# Patient Record
Sex: FEMALE | Race: WHITE | NOT HISPANIC OR LATINO | Employment: FULL TIME | URBAN - METROPOLITAN AREA
[De-identification: names, ages, dates, MRNs, and addresses within clinical notes are randomized per-mention and may not be internally consistent; named-entity substitution may affect disease eponyms.]

---

## 2017-07-20 DIAGNOSIS — R07.89 CHEST DISCOMFORT: ICD-10-CM

## 2017-07-20 DIAGNOSIS — I10 ESSENTIAL HYPERTENSION, MALIGNANT: Primary | ICD-10-CM

## 2018-05-22 ENCOUNTER — OFFICE VISIT (OUTPATIENT)
Dept: GASTROENTEROLOGY | Facility: CLINIC | Age: 68
End: 2018-05-22
Payer: COMMERCIAL

## 2018-05-22 ENCOUNTER — TELEPHONE (OUTPATIENT)
Dept: GASTROENTEROLOGY | Facility: CLINIC | Age: 68
End: 2018-05-22

## 2018-05-22 VITALS
HEART RATE: 80 BPM | HEIGHT: 66 IN | SYSTOLIC BLOOD PRESSURE: 152 MMHG | WEIGHT: 230.19 LBS | BODY MASS INDEX: 36.99 KG/M2 | DIASTOLIC BLOOD PRESSURE: 89 MMHG

## 2018-05-22 DIAGNOSIS — R10.13 DYSPEPSIA: Primary | ICD-10-CM

## 2018-05-22 DIAGNOSIS — R10.11 RIGHT UPPER QUADRANT PAIN: ICD-10-CM

## 2018-05-22 PROCEDURE — 99203 OFFICE O/P NEW LOW 30 MIN: CPT | Mod: S$GLB,,, | Performed by: INTERNAL MEDICINE

## 2018-05-22 PROCEDURE — 3079F DIAST BP 80-89 MM HG: CPT | Mod: CPTII,S$GLB,, | Performed by: INTERNAL MEDICINE

## 2018-05-22 PROCEDURE — 3077F SYST BP >= 140 MM HG: CPT | Mod: CPTII,S$GLB,, | Performed by: INTERNAL MEDICINE

## 2018-05-22 RX ORDER — METFORMIN HYDROCHLORIDE 500 MG/1
1000 TABLET ORAL 2 TIMES DAILY WITH MEALS
COMMUNITY

## 2018-05-22 NOTE — LETTER
May 22, 2018      MAYA Blackman MD  [address]  [address]  [facility] [address]           [facility]  [address], [address]   [facility] [address]  Phone: [phone]  Fax: [phone]          Patient: Morelia Holder   MR Number: [id number]   YOB: 1950   Date of Visit: 5/22/2018       Dear Dr. MAYA Blackman:    Thank you for referring Morelia Holder to me for evaluation. Attached you will find relevant portions of my assessment and plan of care.    If you have questions, please do not hesitate to call me. I look forward to following Morelia Holder along with you.    Sincerely,    Isiah Arreaga Jr., MD    Enclosure  CC:  No Recipients    If you would like to receive this communication electronically, please contact [email or web address] or [phone] to request more information on [employer] Link access.    For providers and/or their staff who would like to refer a patient to [facility], please contact us through our one-stop-shop provider referral line, [facility], at [phone].    If you feel you have received this communication in error or would no longer like to receive these types of communications, please e-mail [email or web address]

## 2018-05-22 NOTE — PATIENT INSTRUCTIONS
Abdominal Ultrasound  An abdominal ultrasound is an imaging test that uses sound waves to form pictures of your abdominal organs. It can help find organ problems, such as gallstones, kidney stones, or liver disease. Ultrasound does not use ionizing radiation and does not have any known risks. It can also see many blood vessels in the abdomen. If needed as part of your exam, the blood flow in these blood vessels can also be evaluated.  Before your test  · What you need to do to get ready for the test depends on the area of your body that will be looked at. Follow any directions youre given for not eating or drinking before the procedure. Your healthcare provider will give you instructions if required.  · Follow all other instructions given by your provider.  For best results, be prepared to answer questions about your medical history, including the following:  · Previous abdominal surgery  · Previous abdominal imaging tests, including ultrasound, CT, or MRI studies  During your test  · You may be asked to put on a gown.  · You will lie on an exam table with your abdomen exposed.  · A nongreasy gel will be put on your skin.  · The sonographer will use a handheld probe (transducer) against your abdomen. This probe helps create images of your abdominal organs.  · You may see the pictures of your organs on screen.  · Certain organs, like the liver, can be biopsied during an ultrasound. This will require additional steps and your provider can discuss these details with you.  The person who performs the ultrasound is called a sonographer. The sonographer can answer questions about the test. But only a doctor can explain the results.    Your test results  Your healthcare provider will discuss the test results with you during a follow-up visit or over the phone. Your next appointment is: _________________  Date Last Reviewed: 2/1/2017  © 0761-0578 [employer]. [address] [place] [address], [place] [address]. All  rights reserved. This information is not intended as a substitute for professional medical care. Always follow your healthcare professional's instructions.

## 2018-05-22 NOTE — PROGRESS NOTES
"Subjective:      Patient ID: Morelia Holder is a 68 y.o. female.    Chief Complaint: Gastroesophageal Reflux and Abdominal Pain    HPI:   Patient 68-year-old female presents for GI follow-up.  Gives a history of chronic intermittent right upper quadrant discomfort.  Indicates this symptom has been present intermittently since her cholecystectomy several years ago.  Following cholecystectomy she had an incisional hernia repair with mesh.  Also treated some dyspepsia and "upset stomach" occasionally.  Response to over-the-counter PPIs.  Indicates she's had both upper and lower endoscopy within the last 5 years.  Past GI history includes appendectomy, cholecystectomy.  Prior endoscopic evaluation by Dr. Chris VIERA over 5 years ago.  Nonsmoker.  Nondrinker.  Denies significant NSAID use  Family history negative for GI neoplasm.    Review of patient's allergies indicates:   Allergen Reactions    Sulfa (sulfonamide antibiotics) Nausea Only     History reviewed. No pertinent past medical history.  Past Surgical History:   Procedure Laterality Date    CHOLECYSTECTOMY Bilateral     COLONOSCOPY      UPPER GASTROINTESTINAL ENDOSCOPY       History reviewed. No pertinent family history.  Social History     Social History    Marital status:      Spouse name: N/A    Number of children: N/A    Years of education: N/A     Occupational History    Not on file.     Social History Main Topics    Smoking status: Never Smoker    Smokeless tobacco: Former User    Alcohol use No    Drug use: Unknown    Sexual activity: Not on file     Other Topics Concern    Not on file     Social History Narrative    No narrative on file       Review of Systems:  Constitutional: Negative for appetite change.  Fatigue  HENT: Negative for trouble swallowing.   Eyes: Negative for photophobia.   Respiratory: Positive for cough.  No shortness of breath.   Cardiovascular: Negative for palpitations.   Gastrointestinal: See HPI for " "details.  Genitourinary: Negative for frequency and hematuria.   Skin: Negative for rash.   Musculoskeletal: Low back pain.  Neurological: Negative for weakness and headaches.   Hematological: Negative.   Psychiatric/Behavioral: Negative for suicidal ideas and behavioral problems.  Anxiety, depression.    Objective:     BP (!) 152/89 (BP Location: Left arm, Patient Position: Sitting)   Pulse 80   Ht 5' 6" (1.676 m)   Wt 104.4 kg (230 lb 3.2 oz)   BMI 37.16 kg/m²     Physical Exam:   alert no distress.  Eyes: Pupils are equal, round, and reactive to light.   Neck: Supple. No mass  Cardiovascular: Regular rhythm . No murmur   Pulmonary/Chest: Lungs clear   Abdominal: Soft. No mass palpated. Nontender, no guarding. Positive bowel sounds   Musculoskeletal: No deformity. No edema.   Psychiatric: Alert and oriented    Assessment:     No diagnosis found.  Plan:     Morelia was seen today for gastroesophageal reflux and abdominal pain.    Diagnoses and all orders for this visit:    Dyspepsia  -     US Abdomen Complete; Future    Right upper quadrant pain  -     US Abdomen Complete; Future      Plan:  Continue PPI when necessary  Ultrasound abdomen  Follow-up    "

## 2018-08-20 DIAGNOSIS — Z12.31 VISIT FOR SCREENING MAMMOGRAM: Primary | ICD-10-CM

## 2018-08-20 DIAGNOSIS — I10 MALIGNANT HYPERTENSION: Primary | ICD-10-CM

## 2019-03-18 ENCOUNTER — HOSPITAL ENCOUNTER (OUTPATIENT)
Dept: RADIOLOGY | Facility: HOSPITAL | Age: 69
Discharge: HOME | End: 2019-03-18
Attending: INTERNAL MEDICINE
Payer: COMMERCIAL

## 2019-03-18 ENCOUNTER — OFFICE VISIT (OUTPATIENT)
Dept: GASTROENTEROLOGY | Facility: CLINIC | Age: 69
End: 2019-03-18
Payer: COMMERCIAL

## 2019-03-18 VITALS
SYSTOLIC BLOOD PRESSURE: 147 MMHG | WEIGHT: 229 LBS | DIASTOLIC BLOOD PRESSURE: 72 MMHG | HEIGHT: 66 IN | HEART RATE: 90 BPM | BODY MASS INDEX: 36.8 KG/M2

## 2019-03-18 DIAGNOSIS — N81.6 RECTOCELE: ICD-10-CM

## 2019-03-18 DIAGNOSIS — E03.9 HYPOTHYROIDISM, UNSPECIFIED TYPE: ICD-10-CM

## 2019-03-18 DIAGNOSIS — R10.11 RUQ ABDOMINAL PAIN: Primary | ICD-10-CM

## 2019-03-18 DIAGNOSIS — E11.8 TYPE 2 DIABETES MELLITUS WITH COMPLICATION, WITHOUT LONG-TERM CURRENT USE OF INSULIN: ICD-10-CM

## 2019-03-18 DIAGNOSIS — R11.2 NON-INTRACTABLE VOMITING WITH NAUSEA, UNSPECIFIED VOMITING TYPE: ICD-10-CM

## 2019-03-18 DIAGNOSIS — K21.9 GASTROESOPHAGEAL REFLUX DISEASE, ESOPHAGITIS PRESENCE NOT SPECIFIED: ICD-10-CM

## 2019-03-18 PROCEDURE — 3078F PR MOST RECENT DIASTOLIC BLOOD PRESSURE < 80 MM HG: ICD-10-PCS | Mod: CPTII,S$GLB,, | Performed by: INTERNAL MEDICINE

## 2019-03-18 PROCEDURE — 74019 RADEX ABDOMEN 2 VIEWS: CPT | Mod: TC,FY

## 2019-03-18 PROCEDURE — 99999 PR PBB SHADOW E&M-EST. PATIENT-LVL IV: ICD-10-PCS | Mod: PBBFAC,,, | Performed by: INTERNAL MEDICINE

## 2019-03-18 PROCEDURE — 99214 OFFICE O/P EST MOD 30 MIN: CPT | Mod: S$GLB,,, | Performed by: INTERNAL MEDICINE

## 2019-03-18 PROCEDURE — 1101F PT FALLS ASSESS-DOCD LE1/YR: CPT | Mod: CPTII,S$GLB,, | Performed by: INTERNAL MEDICINE

## 2019-03-18 PROCEDURE — 3045F PR MOST RECENT HEMOGLOBIN A1C LEVEL 7.0-9.0%: ICD-10-PCS | Mod: CPTII,S$GLB,, | Performed by: INTERNAL MEDICINE

## 2019-03-18 PROCEDURE — 3045F PR MOST RECENT HEMOGLOBIN A1C LEVEL 7.0-9.0%: CPT | Mod: CPTII,S$GLB,, | Performed by: INTERNAL MEDICINE

## 2019-03-18 PROCEDURE — 3077F PR MOST RECENT SYSTOLIC BLOOD PRESSURE >= 140 MM HG: ICD-10-PCS | Mod: CPTII,S$GLB,, | Performed by: INTERNAL MEDICINE

## 2019-03-18 PROCEDURE — 99999 PR PBB SHADOW E&M-EST. PATIENT-LVL IV: CPT | Mod: PBBFAC,,, | Performed by: INTERNAL MEDICINE

## 2019-03-18 PROCEDURE — 3078F DIAST BP <80 MM HG: CPT | Mod: CPTII,S$GLB,, | Performed by: INTERNAL MEDICINE

## 2019-03-18 PROCEDURE — 99214 PR OFFICE/OUTPT VISIT, EST, LEVL IV, 30-39 MIN: ICD-10-PCS | Mod: S$GLB,,, | Performed by: INTERNAL MEDICINE

## 2019-03-18 PROCEDURE — 74019 XR ABDOMEN FLAT AND ERECT: ICD-10-PCS | Mod: 26,,, | Performed by: RADIOLOGY

## 2019-03-18 PROCEDURE — 74019 RADEX ABDOMEN 2 VIEWS: CPT | Mod: 26,,, | Performed by: RADIOLOGY

## 2019-03-18 PROCEDURE — 3077F SYST BP >= 140 MM HG: CPT | Mod: CPTII,S$GLB,, | Performed by: INTERNAL MEDICINE

## 2019-03-18 PROCEDURE — 1101F PR PT FALLS ASSESS DOC 0-1 FALLS W/OUT INJ PAST YR: ICD-10-PCS | Mod: CPTII,S$GLB,, | Performed by: INTERNAL MEDICINE

## 2019-03-18 RX ORDER — TEMAZEPAM 22.5 MG/1
30 CAPSULE ORAL NIGHTLY PRN
COMMUNITY
End: 2019-03-26

## 2019-03-18 RX ORDER — MELOXICAM 7.5 MG/1
7.5 TABLET ORAL DAILY
COMMUNITY

## 2019-03-18 RX ORDER — DULOXETIN HYDROCHLORIDE 20 MG/1
20 CAPSULE, DELAYED RELEASE ORAL DAILY
COMMUNITY

## 2019-03-18 RX ORDER — CAPTOPRIL 50 MG/1
25 TABLET ORAL DAILY
COMMUNITY
End: 2022-05-13 | Stop reason: ALTCHOICE

## 2019-03-18 RX ORDER — PANTOPRAZOLE SODIUM 40 MG/1
40 TABLET, DELAYED RELEASE ORAL DAILY
Qty: 90 TABLET | Refills: 3 | Status: SHIPPED | OUTPATIENT
Start: 2019-03-18 | End: 2022-05-13 | Stop reason: ALTCHOICE

## 2019-03-18 RX ORDER — TRAMADOL HYDROCHLORIDE 50 MG/1
50 TABLET ORAL EVERY 6 HOURS PRN
COMMUNITY
End: 2022-05-13 | Stop reason: ALTCHOICE

## 2019-03-18 RX ORDER — COLESTIPOL HYDROCHLORIDE 5 G/5G
1 GRANULE, FOR SUSPENSION ORAL 2 TIMES DAILY
COMMUNITY
End: 2022-05-13 | Stop reason: ALTCHOICE

## 2019-03-18 NOTE — PROGRESS NOTES
"Subjective:       Patient ID: Morelia Holder is a 68 y.o. female.    Chief Complaint: Abdominal Pain and Nausea    67 yo F complains of RUQ pain.  She states that she had her gallbladder removed 9193-7051.  At that time she had been monitored by Dr. Carrillo for gallbladder issues and surgery had been postponed several times due to having "medical treatment" done after EGDs.  She then presented with acute cholecystitis and was told her gallbladder "burst" and it was removed during an inpatient hospital encounter.  She was hospitalized for 5 days and then was told she could not return to work for the full 6 week post op period.  She subsequently had an incisional hernia at that site with mesh placement.  Despite having gallbladder removed, she continues to have intermittent RUQ pain that feels exactly the same as previous.  She had several EGDs at that time and was dx with GERD; she has never had heartburn or regurgitation, just RUQ/upper abdominal pain.  She is not currently on PPI.  She states she gets RUQ pain with nausea at least 5 times per week.  Frequently it is accompanied by vomiting of bile.  These symptoms are random and not associated with certain foods.  She saw Dr. Arreaga last year, but could not get u/s as she had a death in the family, and then she states radiology would not let her reschedule telling her the order was .  Last colonoscopy was several years ago by Dr. Ibrahim.    She also complains of a "bulge" in her perineum.  This is sometimes improved with BM.  She has h/o bladder sling after birth of first child in late 1970's, and then had 3 additional children.  Her GYN, who is now retired, told her that she would have future problems with this and would need "major surgery" to fix it.  She has frequent urination, and mild difficulty passing BM.  She does not need vaginal digitization in order to pass BM.  She desires evaluation for this.    She has appt with her PCP next " "week.      Review of Systems   Constitutional: Negative for appetite change and unexpected weight change.   Eyes: Negative for photophobia and visual disturbance.   Respiratory: Negative for chest tightness, shortness of breath and wheezing.    Cardiovascular: Negative for chest pain, palpitations and leg swelling.   Genitourinary: Positive for frequency. Negative for dysuria, flank pain and hematuria.   Musculoskeletal: Negative for joint swelling and myalgias.   Skin: Negative for color change and rash.   Neurological: Negative for dizziness and speech difficulty.   Psychiatric/Behavioral: Negative for confusion and hallucinations.       Objective:       BP (!) 147/72 (BP Location: Right arm, Patient Position: Sitting)   Pulse 90   Ht 5' 6" (1.676 m)   Wt 103.9 kg (229 lb)   BMI 36.96 kg/m²     Physical Exam   Constitutional: She is oriented to person, place, and time. She appears well-developed and well-nourished.   Eyes: EOM are normal. Pupils are equal, round, and reactive to light.   Neck: Normal range of motion. Neck supple.   Cardiovascular: Normal rate and regular rhythm.   Pulmonary/Chest: Effort normal and breath sounds normal.   Abdominal: Soft. Bowel sounds are normal. She exhibits no distension and no mass. There is no tenderness. There is no rebound and no guarding.   No RUQ TTP noted   Musculoskeletal: Normal range of motion. She exhibits no edema.   Neurological: She is alert and oriented to person, place, and time.   Psychiatric: She has a normal mood and affect. Her behavior is normal. Judgment and thought content normal.       Assessment:       1. RUQ abdominal pain    2. Non-intractable vomiting with nausea, unspecified vomiting type    3. Gastroesophageal reflux disease, esophagitis presence not specified    4. Rectocele    5. Type 2 diabetes mellitus with complication, without long-term current use of insulin    6. Hypothyroidism, unspecified type        Plan:       RUQ abdominal pain; " Non-intractable vomiting with nausea, unspecified vomiting type  -     US Abdomen Complete; Future; Expected date: 03/18/2019    Gastroesophageal reflux disease, esophagitis presence not specified  -     pantoprazole (PROTONIX) 40 MG tablet; Take 1 tablet (40 mg total) by mouth once daily.  Dispense: 90 tablet; Refill: 3  -     She will do PPI daily for 90 days and then reassess  -     I have a low threshold for repeating EGD if u/s and LFTs are normal, and if she is not better with PPI alone    Rectocele  -     X-Ray Abdomen Flat And Erect; Future; Expected date: 03/18/2019; being done to quantify stool burden  -     Ambulatory consult to Urogynecology    Type 2 diabetes mellitus with complication, without long-term current use of insulin  -     CBC auto differential; Future; Expected date: 03/18/2019  -     Comprehensive metabolic panel; Future; Expected date: 03/18/2019  -     Hemoglobin A1c; Future; Expected date: 03/18/2019  -     Lipid panel; Future; Expected date: 03/18/2019    Hypothyroidism, unspecified type  -     TSH; Future; Expected date: 03/18/2019    She will need repeat screening colonoscopy at a date in the near future, to be done at [facility]

## 2019-03-26 ENCOUNTER — TELEPHONE (OUTPATIENT)
Dept: UROGYNECOLOGY | Facility: CLINIC | Age: 69
End: 2019-03-26

## 2019-03-26 ENCOUNTER — INITIAL CONSULT (OUTPATIENT)
Dept: UROGYNECOLOGY | Facility: CLINIC | Age: 69
End: 2019-03-26
Payer: COMMERCIAL

## 2019-03-26 ENCOUNTER — PATIENT MESSAGE (OUTPATIENT)
Dept: GASTROENTEROLOGY | Facility: CLINIC | Age: 69
End: 2019-03-26

## 2019-03-26 VITALS
HEIGHT: 66 IN | WEIGHT: 221.81 LBS | BODY MASS INDEX: 35.65 KG/M2 | DIASTOLIC BLOOD PRESSURE: 72 MMHG | SYSTOLIC BLOOD PRESSURE: 124 MMHG

## 2019-03-26 DIAGNOSIS — N95.2 VAGINAL ATROPHY: ICD-10-CM

## 2019-03-26 DIAGNOSIS — K46.9 ENTEROCELE: ICD-10-CM

## 2019-03-26 DIAGNOSIS — R19.8 IRREGULAR BOWEL HABITS: ICD-10-CM

## 2019-03-26 DIAGNOSIS — E11.8 TYPE 2 DIABETES MELLITUS WITH COMPLICATION, WITHOUT LONG-TERM CURRENT USE OF INSULIN: ICD-10-CM

## 2019-03-26 DIAGNOSIS — N39.46 MIXED STRESS AND URGE URINARY INCONTINENCE: ICD-10-CM

## 2019-03-26 DIAGNOSIS — R21 GROIN RASH: ICD-10-CM

## 2019-03-26 DIAGNOSIS — R35.1 NOCTURIA MORE THAN TWICE PER NIGHT: ICD-10-CM

## 2019-03-26 DIAGNOSIS — N81.6 RECTOCELE: ICD-10-CM

## 2019-03-26 DIAGNOSIS — R94.31 ABNORMAL EKG: ICD-10-CM

## 2019-03-26 DIAGNOSIS — R61 DIAPHORESIS: ICD-10-CM

## 2019-03-26 DIAGNOSIS — R30.0 DYSURIA: Primary | ICD-10-CM

## 2019-03-26 DIAGNOSIS — Z12.4 CERVICAL CANCER SCREENING: ICD-10-CM

## 2019-03-26 DIAGNOSIS — Z13.820 SCREENING FOR OSTEOPOROSIS: ICD-10-CM

## 2019-03-26 PROCEDURE — 51701 INSERT BLADDER CATHETER: CPT | Mod: S$GLB,,, | Performed by: OBSTETRICS & GYNECOLOGY

## 2019-03-26 PROCEDURE — 51701 PR INSERTION OF NON-INDWELLING BLADDER CATHETERIZATION FOR RESIDUAL UR: ICD-10-PCS | Mod: S$GLB,,, | Performed by: OBSTETRICS & GYNECOLOGY

## 2019-03-26 PROCEDURE — 87086 URINE CULTURE/COLONY COUNT: CPT

## 2019-03-26 PROCEDURE — 99205 OFFICE O/P NEW HI 60 MIN: CPT | Mod: 25,S$GLB,, | Performed by: OBSTETRICS & GYNECOLOGY

## 2019-03-26 PROCEDURE — 99999 PR PBB SHADOW E&M-EST. PATIENT-LVL III: ICD-10-PCS | Mod: PBBFAC,,, | Performed by: OBSTETRICS & GYNECOLOGY

## 2019-03-26 PROCEDURE — 99205 PR OFFICE/OUTPT VISIT, NEW, LEVL V, 60-74 MIN: ICD-10-PCS | Mod: 25,S$GLB,, | Performed by: OBSTETRICS & GYNECOLOGY

## 2019-03-26 PROCEDURE — 88175 CYTOPATH C/V AUTO FLUID REDO: CPT

## 2019-03-26 PROCEDURE — 99999 PR PBB SHADOW E&M-EST. PATIENT-LVL III: CPT | Mod: PBBFAC,,, | Performed by: OBSTETRICS & GYNECOLOGY

## 2019-03-26 RX ORDER — DULOXETIN HYDROCHLORIDE 20 MG/1
CAPSULE, DELAYED RELEASE ORAL
COMMUNITY
End: 2019-03-26 | Stop reason: SDUPTHER

## 2019-03-26 RX ORDER — MELOXICAM 7.5 MG/1
TABLET ORAL
COMMUNITY
End: 2019-03-26 | Stop reason: SDUPTHER

## 2019-03-26 RX ORDER — CYCLOBENZAPRINE HCL 10 MG
TABLET ORAL
COMMUNITY
End: 2019-03-26

## 2019-03-26 RX ORDER — TRAMADOL HYDROCHLORIDE 50 MG/1
TABLET ORAL
COMMUNITY
End: 2019-03-26 | Stop reason: SDUPTHER

## 2019-03-26 RX ORDER — ESTRADIOL 0.1 MG/G
CREAM VAGINAL
Qty: 42.5 G | Refills: 11 | Status: ON HOLD | OUTPATIENT
Start: 2019-03-26 | End: 2022-05-16 | Stop reason: CLARIF

## 2019-03-26 RX ORDER — MONTELUKAST SODIUM 4 MG/1
TABLET, CHEWABLE ORAL
COMMUNITY

## 2019-03-26 RX ORDER — BUTALBITAL, ACETAMINOPHEN AND CAFFEINE 50; 325; 40 MG/1; MG/1; MG/1
TABLET ORAL
COMMUNITY
End: 2022-05-13 | Stop reason: ALTCHOICE

## 2019-03-26 RX ORDER — LEVOTHYROXINE SODIUM 50 UG/1
TABLET ORAL
COMMUNITY
End: 2022-05-13 | Stop reason: ALTCHOICE

## 2019-03-26 RX ORDER — INSULIN GLARGINE 100 [IU]/ML
20 INJECTION, SOLUTION SUBCUTANEOUS NIGHTLY
COMMUNITY

## 2019-03-26 NOTE — PATIENT INSTRUCTIONS
Fiber Information Sheet  Your doctor has recommended that you follow a high fiber diet. The addition of fiber to your diet can make an enormous difference in your bowel control and regularity. Fiber helps people whether they lose stool or have trouble with constipation. Fiber works by bulking the stool and keeping it formed, yet making the movement soft and easy to pass. Fiber helps keep moisture within the stool so that neither diarrhea nor hard stool occurs. Fiber makes the bowels work more regularly, but it is not a laxative. An additional bonus from eating a high fiber diet is that your risk of cancer is reduced, too.    Most of us eat some high fiber foods already, but nearly all of us do not eat the necessary amount. For example, a slice of whole wheat bread contains only about 10% of the daily recommended amount of fiber. This means if you are relying on only whole wheat bread to meet the recommended fiber requirements, you would need to eat  between 10-18 slices every day! Please note that fiber is NOT in any meat or dairy product. It is only found in grains, vegetables and fruits. The recommended daily fiber intake is 20-25 grams. Foods having high fiber content include:     Fiber One Cereal, ½ cup 13.0 g   Whelan beans, ¾ cup 10.4 g   Wheat bran cereal, 1 oz 10.0 g   Kidney beans, ¾ cup 9.3 g   All Bran Cereal, ½ cup 6.0 g   Oat Bran Cereal, hot, 1 oz 4.0 g   Banana, 1medium 3.8 g   Canned pears, ½ cup 3.7 g   3 prunes or ¼ cup raisins 3.5 g   Whole Wheat Total, 1 cup 3.0 g   Carrots, ½ cup 3.2 g   Apple, small 2.8 g   Broccoli, ½ cup 2.8 g   Cauliflower, ½ cup 2.6 g   Oatmeal, 1 oz 2.5 g   Whole Wheat Toast 2.0 g   Cheerios, 1 1/3 cup 2.0 g   Baked potato with skin 2.0 g   Corn, ½ cup 1.9 g   Popcorn, 3 cups 1.9 g   Orange, medium 1.9 g   Granola bar 1.0 g   Lettuce, ½ cup 0.9 g    If you dont think that you can get enough fiber through your everyday diet, there are many good fiber supplements you can  take along with eating your high fiber diet. Some of these are: Metamucil (1 heaping teaspoon or 1-2 wafers), Citrucel (1 tablespoon), Fiberall (1-2 wafers or 1 teaspoon), Perdium (2 rounded teaspoons) and 1-2 teaspoons unprocessed bran (to mix with foods)    You may need to use the fiber supplement up to 3-4 times daily to produce normal elimination. Please follow specific package directions or call us for help in regulating the dose. You may notice some bloating and/or increased gas at first. These symptoms can be relieved by adding fiber to your diet slowly. Once your body gets used to this increased fiber, these symptoms will go away.   -------------------------------------------------------------    Bladder Irritants  Certain foods and drinks have been associated with worsening symptoms of urinary frequency, urgency, urge incontinence, or bladder pain. If you suffer from any of these conditions, you may wish to try eliminating one or more of these foods from your diet and see if your symptoms improve. If bladder symptoms are related to dietary factors, strict adherence to a diet thateliminates the food should bring marked relief in 10 days. Once you are feeling better, you can begin to add foods back into your diet, one at a time. If symptoms return, you will be able to identify the irritant. As you add foods back to your diet it is very important that you drink significant amounts of water.    -----------------------------------------------------------------------------------------------  List of Common Bladder Irritants*  Alcoholic beverages  Apples and apple juice  Cantaloupe  Carbonated beverages  Chili and spicy foods  Chocolate  Citrus fruit  Coffee (including decaffeinated)  Cranberries and cranberry juice  Grapes  Guava  Milk Products: milk, cheese, cottage cheese, yogurt, ice cream  Peaches  Pineapple  Plums  Strawberries  Sugar especially artificial sweeteners, saccharin, aspartame, corn sweeteners,  honey, fructose, sucrose, lactose  Tea  Tomatoes and tomato juice  Vitamin B complex  Vinegar  *Most people are not sensitive to ALL of these products; your goal is to find the foods that make YOUR symptoms worse.  ---------------------------------------------------------------------------------------------------    Low-acid fruit substitutions include apricots, papaya, pears and watermelon. Coffee drinkers can drink Kava or other lowacid instant drinks. Tea drinkers can substitute non-citrus herbal and sun brewed teas. Calcium carbonate co-buffered with calcium ascorbate can be substituted for Vitamin C. Prelief is a dietary supplement that works as an acid blocker for the bladder.    Where to get more information:        Overcoming Bladder Disorders by Summer Portillo and Katiuska Gibbs, 1990        You Dont Have to Live with Cystitis! By Ramona Graves, 1988  · http://www.urologymanagement.org/oab    -------------------------------------------------------------  1) Hot flashes:  --probably not hormone related as last menopausal symptom was ~10 years ago  --worse x 1 week; stopped years ago  --feels general sweaty; doesn't feel like usual HF used to feel  --started PPI recently, which may her nauseated  --TSH 3/2019 WNL, A1c 3/2019 8%, EKG 3/2019 nonspecific T wave abnl, troponin normal 3/2019  --see cardiology in near future to make sure no CAD issues occurring    2)  Stage 2 rectocele/enterocele:  --apex and anterior seem supported: ? H/o uterine suspension  --options: observation vs pessary vs surgery (vaginal rectocele/enterocele repair; seems like ant/apical well-supported)  --control straining with bowel movements    3)  DM2:  --check fasting blood glucose EVERY AM, before and after lunch; record and share with MD after 2-3 weeks--let PCP know  --controlling will help bowel motility     4)  Irregular bowel habits:  --eat high fiber diet  --hydrate well: drink extra 3-4  glasses of water a day  --get some light, gentle exercise  Controlling constipation may help bladder urgency/leakage and fiber may better control cholesterol and blood glucose.  Start daily fiber.  Take 1 tsp of fiber powder (psyllium or other sugar-free powder).  Mix in 8 oz of water.  Take x 3-5 days.  Then, increase fiber by 1 tsp every 3-5 days until stool is easy to pass, well-formed, less erratic.  Stop and continue at that dose.   Do not exceed 6 tsps/day.  May also use over the counter stool softener 1-2 x/day.    --use miralax if needed additionally    5)  Vaginal atrophy (dryness):  Use dime-sized amount  gram of estrogen cream in vagina/around bulge, inner lips, and opening nightly x 2 weeks, then twice a week thereafter.    --this can help skin from rubbing/being uncomfortable  --may also reduce UTIs after menopause and urinary urgency/frequency    6)  Mixed urinary incontinence, urge > stress:    --urine C&S  --control diabetes  --Empty bladder every 3 hours.  Empty well: wait a minute, lean forward on toilet.    --Avoid dietary irritants (see sheet).  Keep diary x 3-5 days to determine your irritants.  --KEGELS: do 10 in AM and 10 in PM, holding each x 10 seconds.  When you feel urge to go, STOP, KEGEL, and when urge has passed, then go to bathroom.  Consider PT in future.    --URGE: consider medication.  Takes 2-4 weeks to see if will have effect.  For dry mouth: get sour, sugar free lozenge or gum.    --STRESS:  Pessary vs. Sling.     7) Nocturia (nighttime urination): stop fluids 2 hours before bed/no water by bed.  If have leg swelling:  Elevate feet above chest x 1 hour before bed to get excess fluid off.  Can also use support hose (knee highs).      8)  Well-woman:  Pap test: years ago.  History of abnormal paps: No.  Pap done today.  If normal, no further needed but need annual GYN exam.   Mammogram: Date of last: 8/18.  Result: Normal  Colonoscopy: Date of last: ~2009.  Result:  Normal per  report.  Repeat due:  Per Dr. Lees.    DEXA:  Ordered.  Please schedule.      9)  Groin rash:  --keep clean/dry  --get antifungal cream: apply externally 2x/day x 7 days  --after that, use Zeasorb or other talc-free, anti fungal powder externally every day (absorbs extra wetness); wipe powder off at end of day    10)  RTC as needed.

## 2019-03-26 NOTE — LETTER
March 26, 2019      Mercedes Lees MD  [address]  [address]  [facility] [address]           [facility]  [address]  [place] [address]  Phone: [phone]          Patient: Morelia Holder   MR Number: [id number]   YOB: 1950   Date of Visit: 3/26/2019       Dear Dr. Mercedes Lees:    Thank you for referring Morelia Holder to me for evaluation. Attached you will find relevant portions of my assessment and plan of care.    If you have questions, please do not hesitate to call me. I look forward to following Morelia Holder along with you.    Sincerely,    Darrick Preston MD    Enclosure  CC:  No Recipients    If you would like to receive this communication electronically, please contact [email or web address] or [phone] to request more information on [employer] Link access.    For providers and/or their staff who would like to refer a patient to [facility], please contact us through our one-stop-shop provider referral line, [facility] [place], at [phone].    If you feel you have received this communication in error or would no longer like to receive these types of communications, please e-mail [email or web address]

## 2019-03-26 NOTE — PROGRESS NOTES
[facility] - GYNECOLOGY  [address]  [place] [address]    Morelia Holder  [id number]  1950  March 26, 2019    Consulting Physician: Mercedes Lees MD   GYN: none  Primary M.D.: MAYA Blackman MD    Chief Complaint   Patient presents with    Vaginal Prolapse     rectocele    PELVIC PRESSURE       HPI:     1)  UI:  (+) MICKY (rarely) < (+) UUI x few months.   (--) pads: underwear changes 1-2 times/PM.  Daytime frequency: Q 2 hours.  Nocturia: Yes: 2/night, more during last month.  (--) dysuria,  (--) hematuria,  (--) frequent UTIs.  (+) complete bladder emptying. Has to DV to help with some PV urgency, small PV amount.     2)  POP:  Present x 6-8 months but was told years ago that she would eventually start having issues. To introitus.  Symptoms:(+)  Mostly pressure with standing/sitting.  (--) vaginal bleeding. (--) vaginal discharge. Has had some vaginal burning x a few days + UUI, new.  (--) sexually active.  (--) h/o dyspareunia.  (--)  Vaginal dryness.  (--) vaginal estrogen use.     3)  BM:  (--) constipation/straining.  (--) chronic diarrhea. +loose stool regularly--was started on miralax for constipation on xray and PPI. Could not tolerate PPI due to N/V.   (--) hematochezia.  (--) fecal incontinence.  (--) fecal smearing/urgency.  (+) complete evacuation.  Not x 1 week.     4)  Hot flashes:  --worse x 1 week; stopped years ago  --feels general sweaty; doesn't feel like usual HF used to feel  --started PPI recently, which may her nauseated  --TSH 3/2019 WNL, A1c 3/2019 8%, EKG 3/2019 nonspecific T wave abnl, troponin normal 3/2019    Past Medical History  Past Medical History:   Diagnosis Date    Diabetes mellitus     GERD (gastroesophageal reflux disease)     Insomnia     Muscle spasm     Restless leg    DM: FBG: not checking.  Has seen nutritionist--tries to follow. No secondary disease.  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   Lab Results   Component Value Date    HGBA1C 8.0 (H)  "2019     Past Surgical History  Past Surgical History:   Procedure Laterality Date    BLADDER SUSPENSION      CHOLECYSTECTOMY Bilateral     COLONOSCOPY      UPPER GASTROINTESTINAL ENDOSCOPY     xlap/pfannenstiel bladder suspension + appy: ? Velasco or AF sling--had to have farmer in/out x several weeks post procedure.  Was told this was a "uterine suspension."  LSC stephania; c/b Hernia--had vert midline repair with mesh     Hysterectomy: none    Past Ob History     x 6.  C/s x 0.    Largest infant weight: 10#2oz.   yes FAVD. yes episiotomy.      Gynecologic History  LMP: No LMP recorded. Patient is postmenopausal.  Age of menarche: 15 yo.   Age of menopause: 41 yo  Menstrual history: h/o normal.   Pap test: years ago.  History of abnormal paps: No.  History of STIs:  No  Mammogram: Date of last: .  Result: Normal  Colonoscopy: Date of last: ~.  Result:  Normal per report.  Repeat due:  Per Dr. Lees.    DEXA:  none    Family History  Family History   Problem Relation Age of Onset    Breast cancer Neg Hx     Ovarian cancer Neg Hx     Cervical cancer Neg Hx     Endometrial cancer Neg Hx     Vaginal cancer Neg Hx       Colon CA: No  Breast CA: No  GYN CA: No   CA: Yes - mother (smoker)    Social History  Social History     Tobacco Use   Smoking Status Never Smoker   Smokeless Tobacco Never Used     Social History     Substance and Sexual Activity   Alcohol Use No   .    Social History     Substance and Sexual Activity   Drug Use Not Currently     The patient is .  Resides in [place] [address].  Employment status: currently employed [employer].     Allergies  Review of patient's allergies indicates:   Allergen Reactions    Sulfa (sulfonamide antibiotics) Nausea Only    Statins-hmg-coa reductase inhibitors     Sulfabenzamide        Medications  Current Outpatient Medications on File Prior to Visit   Medication Sig Dispense Refill    blood sugar diagnostic (CONTOUR " NEXT TEST STRIPS MISC) Contour Next Test Strips      butalbital-acetaminophen-caffeine -40 mg (FIORICET, ESGIC) -40 mg per tablet butalbital-acetaminophen-caffeine 50 mg-325 mg-40 mg tablet      captopril (CAPOTEN) 50 MG tablet Take 25 mg by mouth once daily.      colestipol (COLESTID) 1 gram Tab       colestipol (COLESTID) 5 gram Pack Take 1 g by mouth 2 (two) times daily.      DULoxetine (CYMBALTA) 20 MG capsule Take 20 mg by mouth once daily.      insulin (BASAGLAR KWIKPEN U-100 INSULIN) glargine 100 units/mL (3mL) SubQ pen Basaglar KwikPen U-100 Insulin 100 unit/mL (3 mL) subcutaneous      lancets (MICROLET LANCET MISC) Microlet Lancet   TEST TWICE A DAY      meloxicam (MOBIC) 7.5 MG tablet Take 7.5 mg by mouth once daily.      metFORMIN (GLUCOPHAGE) 500 MG tablet Take 1,000 mg by mouth 2 (two) times daily with meals.       multivit-calc-iron-FA-K-hb#244 (ALIVE WOMEN'S ENERGY) -80 mg-mcg-mcg Tab Alive Women's Energy   Take one tablet by mouth every day.      ondansetron (ZOFRAN-ODT) 4 MG TbDL Take 1 tablet (4 mg total) by mouth every 8 (eight) hours as needed (nausea). 15 tablet 0    traMADol (ULTRAM) 50 mg tablet Take 50 mg by mouth every 6 (six) hours as needed for Pain.      vitamin B complex 100  2-herbs 100 mg Tab vitamin B complex   Take one tablet by mouth every day.      zolpidem (AMBIEN) 10 mg Tab Take 5 mg by mouth nightly as needed.      [DISCONTINUED] DULoxetine (CYMBALTA) 20 MG capsule       [DISCONTINUED] meloxicam (MOBIC) 7.5 MG tablet       [DISCONTINUED] multivitamin capsule Take 1 capsule by mouth once daily.      [DISCONTINUED] traMADol (ULTRAM) 50 mg tablet       levothyroxine (SYNTHROID) 50 MCG tablet levothyroxine 50 mcg tablet      pantoprazole (PROTONIX) 40 MG tablet Take 1 tablet (40 mg total) by mouth once daily. 90 tablet 3    [DISCONTINUED] cyclobenzaprine (FLEXERIL) 10 MG tablet cyclobenzaprine 10 mg tablet      [DISCONTINUED] temazepam  "(RESTORIL) 22.5 MG capsule Take 30 mg by mouth nightly as needed for Insomnia.       No current facility-administered medications on file prior to visit.        Review of Systems A 14 point ROS was reviewed with pertinent positives as noted above in the history of present illness.      Constitutional: negative  Eyes: negative  Endocrine: negative  Gastrointestinal: negative  Cardiovascular: negative  Respiratory: negative  Allergic/Immunologic: negative  Integumentary: negative  Psychiatric: negative  Musculoskeletal: negative   Ear/Nose/Throat: negative  Neurologic: negative  Genitourinary: SEE HPI  Hematologic/Lymphatic: negative   Breast: negative    Urogynecologic Exam  /72 (BP Location: Left arm, Patient Position: Sitting, BP Method: Medium (Manual))   Ht 5' 6" (1.676 m)   Wt 100.6 kg (221 lb 12.5 oz)   BMI 35.80 kg/m²     GENERAL APPEARANCE:  The patient is well-developed, well-nourished.   Neck:  Supple with no thyromegaly, no carotid bruits.  Heart:  Regular rate and rhythm, no murmurs, rubs or gallops.  Lungs:  Clear.  No CVA tenderness.  Abdomen:  Soft, nontender, nondistended, no hepatosplenomegaly.  Incisions:  Vertical midline supra umbilical well-healed; LSC stephania well-healed    PELVIC:    External genitalia:  Normal Bartholins, Skenes and labia bilaterally.  Erythema at B Groin, concerning for candida vs atopic irritation from clothing/wetness.   Urethra:  No caruncle, diverticulum or masses.  (+) hypermobility.    Vagina:  Atrophy (+) , no bladder masses or tender, no discharge.    Cervix:  normal appearance. Pap done.   Uterus: normal size, contour, position, consistency, mobility, non-tender  Adnexa: Not palpable.    POP-Q:  Aa -1; Ba -1; C -6; Ap +1; Bp +1 (high to cuff); D -7.  Genital hiatus 4, perineal body 2, total vaginal length 10.    NEUROLOGIC:  Cranial nerves 2 through 12 intact.  Strength 5/5.  DTRs 2+ lower extremities.  S2 through 4 normal.  Sacral reflexes intact.    EXT: " BURT, 2+ pulses bilaterally, no C/C/E    COUGH STRESS TEST:  negative  KEGEL: 1 /5    RECTAL:    External:  Normal, (--) hemorrhoids, (--) dovetailing.   Internal:   (--) tenderness, (--) masses, Normal resting tone, Normal active tone. High rectocele/enterocele.     PVR: 20 mL    Impression    1. Dysuria    2. Diaphoresis    3. Abnormal EKG    4. Vaginal atrophy    5. Screening for osteoporosis    6. Cervical cancer screening    7. Type 2 diabetes mellitus with complication, without long-term current use of insulin    8. Rectocele    9. Enterocele    10. Irregular bowel habits    11. Nocturia more than twice per night    12. Mixed stress and urge urinary incontinence    13. Groin rash        Initial Plan  The patient was counseled regarding these issues. The patient was given a summary sheet containing each of these issues with possible options for evaluation and management. When appropriate, we also reviewed computer-generated diagrams specific to their diagnoses..  All questions were addressed to the patient's satisfaction.    1) Hot flashes:  --probably not hormone related as last menopausal symptom was ~10 years ago  --worse x 1 week; stopped years ago  --feels general sweaty; doesn't feel like usual HF used to feel  --started PPI recently, which may her nauseated  --TSH 3/2019 WNL, A1c 3/2019 8%, EKG 3/2019 nonspecific T wave abnl, troponin normal 3/2019  --see cardiology in near future to make sure no CAD issues occurring    2)  Stage 2 rectocele/enterocele:  --apex and anterior seem supported: ? H/o uterine suspension  --options: observation vs pessary vs surgery (vaginal rectocele/enterocele repair; seems like ant/apical well-supported)  --control straining with bowel movements    3)  DM2:  --check fasting blood glucose EVERY AM, before and after lunch; record and share with MD after 2-3 weeks--let PCP know  --controlling will help bowel motility     4)  Irregular bowel habits:  --eat high fiber  diet  --hydrate well: drink extra 3-4 glasses of water a day  --get some light, gentle exercise  Controlling constipation may help bladder urgency/leakage and fiber may better control cholesterol and blood glucose.  Start daily fiber.  Take 1 tsp of fiber powder (psyllium or other sugar-free powder).  Mix in 8 oz of water.  Take x 3-5 days.  Then, increase fiber by 1 tsp every 3-5 days until stool is easy to pass, well-formed, less erratic.  Stop and continue at that dose.   Do not exceed 6 tsps/day.  May also use over the counter stool softener 1-2 x/day.    --use miralax if needed additionally    5)  Vaginal atrophy (dryness):  Use dime-sized amount  gram of estrogen cream in vagina/around bulge, inner lips, and opening nightly x 2 weeks, then twice a week thereafter.    --this can help skin from rubbing/being uncomfortable  --may also reduce UTIs after menopause and urinary urgency/frequency    6)  Mixed urinary incontinence, urge > stress:    --urine C&S  --control diabetes  --Empty bladder every 3 hours.  Empty well: wait a minute, lean forward on toilet.    --Avoid dietary irritants (see sheet).  Keep diary x 3-5 days to determine your irritants.  --KEGELS: do 10 in AM and 10 in PM, holding each x 10 seconds.  When you feel urge to go, STOP, KEGEL, and when urge has passed, then go to bathroom.  Consider PT in future.    --URGE: consider medication.  Takes 2-4 weeks to see if will have effect.  For dry mouth: get sour, sugar free lozenge or gum.    --STRESS:  Pessary vs. Sling.     7) Nocturia (nighttime urination): stop fluids 2 hours before bed/no water by bed.  If have leg swelling:  Elevate feet above chest x 1 hour before bed to get excess fluid off.  Can also use support hose (knee highs).      8)  Well-woman:  Pap test: years ago.  History of abnormal paps: No.  Pap done today.  If normal, no further needed but need annual GYN exam.   Mammogram: Date of last: 8/18.  Result: Normal  Colonoscopy: Date of  last: ~2009.  Result:  Normal per report.  Repeat due:  Per Dr. Lees.    DEXA:  Ordered.  Please schedule.      9)  Groin rash:  --keep clean/dry  --get antifungal cream: apply externally 2x/day x 7 days  --after that, use Zeasorb or other talc-free, anti fungal powder externally every day (absorbs extra wetness); wipe powder off at end of day    10)  RTC as needed.     Approximately 60 min were spent in consult, 90 % in discussion.     Thank you for requesting consultation of your patient.  I look forward to participating in their care.    Darrick Preston  Female Pelvic Medicine and Reconstructive Surgery  [facility] [place], [place]

## 2019-03-27 ENCOUNTER — TELEPHONE (OUTPATIENT)
Dept: UROGYNECOLOGY | Facility: CLINIC | Age: 69
End: 2019-03-27

## 2019-03-27 ENCOUNTER — TELEPHONE (OUTPATIENT)
Dept: GASTROENTEROLOGY | Facility: CLINIC | Age: 69
End: 2019-03-27

## 2019-03-27 NOTE — TELEPHONE ENCOUNTER
----- Message from Natalie Lynn sent at 3/27/2019  8:47 AM CDT -----  No. [phone]    Patient returned your call regarding results.

## 2019-03-27 NOTE — TELEPHONE ENCOUNTER
----- Message from Darrick Preston MD sent at 3/26/2019  9:06 PM CDT -----  Regarding: please call patient and help make appt  Please call patient and help her make appt with cardiology. Thanks!

## 2019-03-28 LAB — BACTERIA UR CULT: NO GROWTH

## 2021-05-04 ENCOUNTER — PATIENT MESSAGE (OUTPATIENT)
Dept: RESEARCH | Facility: HOSPITAL | Age: 71
End: 2021-05-04

## 2022-03-18 ENCOUNTER — TELEPHONE (OUTPATIENT)
Dept: GASTROENTEROLOGY | Facility: CLINIC | Age: 72
End: 2022-03-18
Payer: MEDICARE

## 2022-03-18 NOTE — LETTER
March 18, 2022    Morelia Holder  [address] [facility] [address]             [facility] - Gastroenterology  [address], [place]   [facility] [address]  Phone: [phone]  Fax: [phone] Dear Ms. Holder:    We have attempted to contact you to schedule a screening colonoscopy that was ordered by your doctor. Please contact the office to schedule at [phone].      If you have any questions or concerns, please don't hesitate to call.    Sincerely,        Mercedes Lees MD

## 2022-04-05 ENCOUNTER — TELEPHONE (OUTPATIENT)
Dept: GASTROENTEROLOGY | Facility: CLINIC | Age: 72
End: 2022-04-05
Payer: MEDICARE

## 2022-04-05 RX ORDER — SODIUM, POTASSIUM,MAG SULFATES 17.5-3.13G
1 SOLUTION, RECONSTITUTED, ORAL ORAL DAILY
Qty: 1 KIT | Refills: 0 | Status: SHIPPED | OUTPATIENT
Start: 2022-04-05 | End: 2022-04-07

## 2022-04-05 NOTE — TELEPHONE ENCOUNTER
Referring Physician: Dr. Moreau                             Date: 4/5/2022    Reason for Referral: Screening colonoscopy      Family History of:   Colon polyp: No  Relationship/Age of Onset:       Colon cancer: No  Relationship/Age of Onset:       Patient with:   Hemoccults Done:       Iron deficient:   No      On Blood Thinner: No      Valvular heart disease/valve replacement: No      Anemia Present: No      On NSAID: Meloxicam      Lung disease: No      Kidney disease: no      Hx of polyps: No      Hx of colon cancer: No      Previous colon evalations: Yes  When: ?  Where:Dr. Ibrahim  Pertinent symptoms:           Review of patient's allergies indicates: Sulfa and Statins        Patient was scheduled for colonoscopy on  5/16/2022      with Dr. Lees at [facility].       instructions were reviewed with patient.        Prep sent to [facility] in [place]    SUPREP Instructions    You are scheduled for a colonoscopy with Dr. Lees on 5/16/2022 at [facility]. Enter through the [facility] Entrance and check in at Same Day Surgery.  To ensure that your test is accurate and complete, you MUST follow these instructions listed below.  If you have any questions, please call our office at [phone].  Plan on being at the hospital for your procedure for 3-4 hours.    1.  Follow a CLEAR LIQUID DIET for the entire day before your scheduled colonoscopy.  This means no solid food the entire day starting when you wake.  You may have as much of the clear liquids as you want throughout the day.   CLEAR LIQUID DIET:   - Avoid Red, Orange, Purple, and/or Blue food coloring   - NO DAIRY   - You can have:  Coffee with sugar (no creamer), tea, water, soda, apple or white grape juice, chicken or beef broth/bouillon (no meat, noodles, or veggies), green/yellow popsicles, green/yellow Jell-O, lemonade.    2.  AT 5 pm the evening before your colonoscopy, POUR ONE (1) BOTTLE OF SUPREP INTO THE MIXING CONTAINER, PROVIDED INSIDE  THE BOX.  ADD WATER TO THE LINE ON THE CONTAINER AND MIX IT WELL.  DRINK THE ENTIRE CONTAINER AND THEN DRINK TWO (2) MORE CONTAINERS OF WATER OVER THE NEXT 1 HOUR.  This is sometimes easier to drink if this solution is cold, so you can mix the solution 20 minutes ahead of time and place in the refrigerator prior to drinking.  You have to drink the solution within 30-45 minutes of mixing it.  Do NOT put this solution over ice.  It IS ok to drink with a straw.    3.  The endoscopy department will call you 1 day before your colonoscopy to tell you the exact time to arrive, AND to tell you the exact time to drink the 2nd portion of your prep (which will be FIVE HOURS BEFORE YOUR ARRIVAL TIME).  At this time given to you, POUR ONE (1) BOTTLE OF SUPREP INTO THE MIXING CONTAINER, PROVIDED INSIDE THE BOX.  ADD WATER TO THE LINE ON THE CONTAINER AND MIX IT WELL.  DRINK THE ENTIRE CONTAINER AND THEN DRINK TWO (2) MORE CONTAINERS OF WATER OVER THE NEXT 1 HOUR.  This is sometimes easier to drink if this solution is cold, so you can mix the solution 20 minutes ahead of time and place in the refrigerator prior to drinking.  You have to drink the solution within 30-45 minutes of mixing it.  Do NOT put this solution over ice.  It IS ok to drink with a straw.  Once this is complete, you may not have ANYTHING else by mouth!    4.  You must have someone with you to DRIVE YOU HOME since you will be receiving IV sedation for the colonoscopy.    5.  It is ok to take MOST of your REGULAR MEDICATIONS  in the morning of your test with a SIP of water.  THE ONLY MEDS YOU NEED TO HOLD ARE YOUR DIABETES MEDICATIONS,  SOME BLOOD PRESSURE MEDS, AND BLOOD THINNERS IF OK'D BY YOUR DOCTOR.  Do NOT have anything else to eat or drink the morning of your colonoscopy.  It is ok to brush your teeth.    6.  If you are on blood thinners THAT YOU HAVE BEEN INSTRUCTED TO HOLD BY YOUR DOCTOR FOR THIS PROCEDURE, then do NOT take this the morning of your  colonoscopy.  Do NOT stop these medications on your own, they must be approved to be held by your doctor.  Your colonoscopy can NOT be done if you are on these medications.  Examples of blood thinners include: Coumadin, Aggrenox, Plavix, Pradaxa, Reapro, Pletal, Xarelto, Ticagrelor, Brilinta, Eliquis, and high dose aspirin (325 mg).  You do not have to stop baby aspirin 81 mg.    7.  IF YOU ARE DIABETIC:  NO INSULIN OR ORAL MEDICATIONS THE MORNING OF THE COLONOSCOPY.  TAKE ONLY HALF THE DOSE OF YOUR INSULIN THE DAY BEFORE THE COLONOSCOPY.  DO NOT TAKE ANY ORAL DIABETIC MEDICATIONS THE DAY BEFORE THE COLONOSCOPY.  IF YOU ARE AN INSULIN DEPENDENT DIABETIC WITH UNSTABLE BLOOD SUGARS, NOTIFY YOUR PRIMARY CARE PHYSICIAN FOR INSTRUCTIONS.

## 2022-05-16 DIAGNOSIS — R94.31 ABNORMAL EKG: Primary | ICD-10-CM

## 2022-05-26 ENCOUNTER — TELEPHONE (OUTPATIENT)
Dept: GASTROENTEROLOGY | Facility: CLINIC | Age: 72
End: 2022-05-26
Payer: MEDICARE

## 2022-05-26 NOTE — TELEPHONE ENCOUNTER
----- Message from Mercedes Lees MD sent at 5/20/2022 10:07 AM CDT -----  Small benign TVA, repeat 5 years

## 2022-06-02 ENCOUNTER — OFFICE VISIT (OUTPATIENT)
Dept: CARDIOLOGY | Facility: CLINIC | Age: 72
End: 2022-06-02
Payer: MEDICARE

## 2022-06-02 ENCOUNTER — ANESTHESIA EVENT (OUTPATIENT)
Dept: MEDSURG UNIT | Facility: HOSPITAL | Age: 72
DRG: 244 | End: 2022-06-02
Payer: MEDICARE

## 2022-06-02 ENCOUNTER — HOSPITAL ENCOUNTER (INPATIENT)
Facility: HOSPITAL | Age: 72
LOS: 1 days | Discharge: HOME | DRG: 244 | End: 2022-06-03
Attending: EMERGENCY MEDICINE | Admitting: HOSPITALIST
Payer: MEDICARE

## 2022-06-02 ENCOUNTER — ANESTHESIA (OUTPATIENT)
Dept: MEDSURG UNIT | Facility: HOSPITAL | Age: 72
DRG: 244 | End: 2022-06-02
Payer: MEDICARE

## 2022-06-02 VITALS
WEIGHT: 234.19 LBS | SYSTOLIC BLOOD PRESSURE: 151 MMHG | HEART RATE: 44 BPM | HEIGHT: 68 IN | OXYGEN SATURATION: 98 % | BODY MASS INDEX: 35.49 KG/M2 | DIASTOLIC BLOOD PRESSURE: 71 MMHG

## 2022-06-02 DIAGNOSIS — I44.2 CHB (COMPLETE HEART BLOCK): Primary | ICD-10-CM

## 2022-06-02 DIAGNOSIS — I44.2 COMPLETE HEART BLOCK: ICD-10-CM

## 2022-06-02 DIAGNOSIS — R00.1 SYMPTOMATIC BRADYCARDIA: ICD-10-CM

## 2022-06-02 DIAGNOSIS — R07.9 CHEST PAIN: ICD-10-CM

## 2022-06-02 DIAGNOSIS — E66.01 SEVERE OBESITY (BMI 35.0-39.9) WITH COMORBIDITY: Primary | ICD-10-CM

## 2022-06-02 DIAGNOSIS — R00.1 BRADYCARDIA: ICD-10-CM

## 2022-06-02 DIAGNOSIS — R94.31 ABNORMAL EKG: ICD-10-CM

## 2022-06-02 DIAGNOSIS — I44.2 COMPLETE AV BLOCK: ICD-10-CM

## 2022-06-02 DIAGNOSIS — E11.9 DIABETES MELLITUS WITHOUT COMPLICATION: ICD-10-CM

## 2022-06-02 DIAGNOSIS — E03.2 HYPOTHYROIDISM DUE TO MEDICATION: ICD-10-CM

## 2022-06-02 PROBLEM — I10 HYPERTENSION: Status: ACTIVE | Noted: 2022-06-02

## 2022-06-02 PROBLEM — E11.8 TYPE 2 DIABETES MELLITUS WITH COMPLICATION: Status: RESOLVED | Noted: 2019-03-18 | Resolved: 2022-06-02

## 2022-06-02 LAB
ALBUMIN SERPL BCP-MCNC: 3.8 G/DL (ref 3.5–5.2)
ALP SERPL-CCNC: 38 U/L (ref 55–135)
ALT SERPL W/O P-5'-P-CCNC: 23 U/L (ref 10–44)
ANION GAP SERPL CALC-SCNC: 8 MMOL/L (ref 8–16)
APTT BLDCRRT: 22.6 SEC (ref 21–32)
ASCENDING AORTA: 3.47 CM
AST SERPL-CCNC: 20 U/L (ref 10–40)
AV INDEX (PROSTH): 0.85
AV MEAN GRADIENT: 6 MMHG
AV PEAK GRADIENT: 10 MMHG
AV VALVE AREA: 2.88 CM2
AV VELOCITY RATIO: 0.81
BASOPHILS # BLD AUTO: 0.04 K/UL (ref 0–0.2)
BASOPHILS NFR BLD: 0.8 % (ref 0–1.9)
BILIRUB SERPL-MCNC: 0.9 MG/DL (ref 0.1–1)
BNP SERPL-MCNC: 252 PG/ML (ref 0–99)
BSA FOR ECHO PROCEDURE: 2.21 M2
BUN SERPL-MCNC: 15 MG/DL (ref 8–23)
CALCIUM SERPL-MCNC: 9 MG/DL (ref 8.7–10.5)
CHLORIDE SERPL-SCNC: 110 MMOL/L (ref 95–110)
CO2 SERPL-SCNC: 23 MMOL/L (ref 23–29)
CREAT SERPL-MCNC: 1.1 MG/DL (ref 0.5–1.4)
CV ECHO LV RWT: 0.29 CM
DIFFERENTIAL METHOD: ABNORMAL
DOP CALC AO PEAK VEL: 1.62 M/S
DOP CALC AO VTI: 38.44 CM
DOP CALC LVOT AREA: 3.4 CM2
DOP CALC LVOT DIAMETER: 2.08 CM
DOP CALC LVOT PEAK VEL: 1.32 M/S
DOP CALC LVOT STROKE VOLUME: 110.78 CM3
DOP CALCLVOT PEAK VEL VTI: 32.62 CM
E WAVE DECELERATION TIME: 223.44 MSEC
E/A RATIO: 1.36
E/E' RATIO: 13.07 M/S
ECHO LV POSTERIOR WALL: 0.83 CM (ref 0.6–1.1)
EJECTION FRACTION: 65 %
EOSINOPHIL # BLD AUTO: 0.2 K/UL (ref 0–0.5)
EOSINOPHIL NFR BLD: 4.5 % (ref 0–8)
ERYTHROCYTE [DISTWIDTH] IN BLOOD BY AUTOMATED COUNT: 13 % (ref 11.5–14.5)
EST. GFR  (AFRICAN AMERICAN): 58 ML/MIN/1.73 M^2
EST. GFR  (NON AFRICAN AMERICAN): 50.3 ML/MIN/1.73 M^2
FRACTIONAL SHORTENING: 44 % (ref 28–44)
GLUCOSE SERPL-MCNC: 83 MG/DL (ref 70–110)
GLUCOSE SERPL-MCNC: 85 MG/DL (ref 70–110)
HCT VFR BLD AUTO: 36.4 % (ref 37–48.5)
HGB BLD-MCNC: 12.3 G/DL (ref 12–16)
IMM GRANULOCYTES # BLD AUTO: 0.02 K/UL (ref 0–0.04)
IMM GRANULOCYTES NFR BLD AUTO: 0.4 % (ref 0–0.5)
INR PPP: 1 (ref 0.8–1.2)
INTERVENTRICULAR SEPTUM: 1.06 CM (ref 0.6–1.1)
LA MAJOR: 5.54 CM
LA MINOR: 5.34 CM
LA WIDTH: 4.21 CM
LEFT ATRIUM SIZE: 3.28 CM
LEFT ATRIUM VOLUME INDEX MOD: 32.8 ML/M2
LEFT ATRIUM VOLUME INDEX: 30 ML/M2
LEFT ATRIUM VOLUME MOD: 69.76 CM3
LEFT ATRIUM VOLUME: 63.83 CM3
LEFT INTERNAL DIMENSION IN SYSTOLE: 3.18 CM (ref 2.1–4)
LEFT VENTRICLE DIASTOLIC VOLUME INDEX: 72.99 ML/M2
LEFT VENTRICLE DIASTOLIC VOLUME: 155.46 ML
LEFT VENTRICLE MASS INDEX: 97 G/M2
LEFT VENTRICLE SYSTOLIC VOLUME INDEX: 18.9 ML/M2
LEFT VENTRICLE SYSTOLIC VOLUME: 40.31 ML
LEFT VENTRICULAR INTERNAL DIMENSION IN DIASTOLE: 5.63 CM (ref 3.5–6)
LEFT VENTRICULAR MASS: 205.94 G
LV LATERAL E/E' RATIO: 10.89 M/S
LV SEPTAL E/E' RATIO: 16.33 M/S
LYMPHOCYTES # BLD AUTO: 1.2 K/UL (ref 1–4.8)
LYMPHOCYTES NFR BLD: 25.2 % (ref 18–48)
MAGNESIUM SERPL-MCNC: 1.6 MG/DL (ref 1.6–2.6)
MCH RBC QN AUTO: 30.6 PG (ref 27–31)
MCHC RBC AUTO-ENTMCNC: 33.8 G/DL (ref 32–36)
MCV RBC AUTO: 91 FL (ref 82–98)
MONOCYTES # BLD AUTO: 0.5 K/UL (ref 0.3–1)
MONOCYTES NFR BLD: 9.3 % (ref 4–15)
MV PEAK A VEL: 0.72 M/S
MV PEAK E VEL: 0.98 M/S
MV STENOSIS PRESSURE HALF TIME: 64.8 MS
MV VALVE AREA P 1/2 METHOD: 3.4 CM2
NEUTROPHILS # BLD AUTO: 3 K/UL (ref 1.8–7.7)
NEUTROPHILS NFR BLD: 59.8 % (ref 38–73)
NRBC BLD-RTO: 0 /100 WBC
PISA TR MAX VEL: 2.75 M/S
PLATELET # BLD AUTO: 186 K/UL (ref 150–450)
PMV BLD AUTO: 11 FL (ref 9.2–12.9)
POCT GLUCOSE: 83 MG/DL (ref 70–110)
POTASSIUM SERPL-SCNC: 4.7 MMOL/L (ref 3.5–5.1)
PROT SERPL-MCNC: 6.6 G/DL (ref 6–8.4)
PROTHROMBIN TIME: 10.7 SEC (ref 9–12.5)
PULM VEIN S/D RATIO: 3.92
PV PEAK D VEL: 0.25 M/S
PV PEAK S VEL: 0.98 M/S
RA MAJOR: 5.54 CM
RA PRESSURE: 3 MMHG
RA WIDTH: 3.07 CM
RBC # BLD AUTO: 4.02 M/UL (ref 4–5.4)
RIGHT VENTRICULAR END-DIASTOLIC DIMENSION: 3.44 CM
RV TISSUE DOPPLER FREE WALL SYSTOLIC VELOCITY 1 (APICAL 4 CHAMBER VIEW): 14.88 CM/S
SINUS: 3.7 CM
SODIUM SERPL-SCNC: 141 MMOL/L (ref 136–145)
STJ: 2.9 CM
TDI LATERAL: 0.09 M/S
TDI SEPTAL: 0.06 M/S
TDI: 0.08 M/S
TR MAX PG: 30 MMHG
TRICUSPID ANNULAR PLANE SYSTOLIC EXCURSION: 2.4 CM
TROPONIN I SERPL DL<=0.01 NG/ML-MCNC: 0.01 NG/ML (ref 0–0.03)
TV REST PULMONARY ARTERY PRESSURE: 33 MMHG
WBC # BLD AUTO: 4.93 K/UL (ref 3.9–12.7)

## 2022-06-02 PROCEDURE — 33208 INSRT HEART PM ATRIAL & VENT: CPT | Mod: KX | Performed by: STUDENT IN AN ORGANIZED HEALTH CARE EDUCATION/TRAINING PROGRAM

## 2022-06-02 PROCEDURE — C1894 INTRO/SHEATH, NON-LASER: HCPCS | Performed by: STUDENT IN AN ORGANIZED HEALTH CARE EDUCATION/TRAINING PROGRAM

## 2022-06-02 PROCEDURE — 99205 OFFICE O/P NEW HI 60 MIN: CPT | Mod: 25,S$GLB,, | Performed by: INTERNAL MEDICINE

## 2022-06-02 PROCEDURE — 1159F PR MEDICATION LIST DOCUMENTED IN MEDICAL RECORD: ICD-10-PCS | Mod: CPTII,S$GLB,, | Performed by: INTERNAL MEDICINE

## 2022-06-02 PROCEDURE — 1160F RVW MEDS BY RX/DR IN RCRD: CPT | Mod: CPTII,S$GLB,, | Performed by: INTERNAL MEDICINE

## 2022-06-02 PROCEDURE — 1126F AMNT PAIN NOTED NONE PRSNT: CPT | Mod: CPTII,S$GLB,, | Performed by: INTERNAL MEDICINE

## 2022-06-02 PROCEDURE — 83880 ASSAY OF NATRIURETIC PEPTIDE: CPT | Performed by: PHYSICIAN ASSISTANT

## 2022-06-02 PROCEDURE — 99223 PR INITIAL HOSPITAL CARE,LEVL III: ICD-10-PCS | Mod: GC,,, | Performed by: STUDENT IN AN ORGANIZED HEALTH CARE EDUCATION/TRAINING PROGRAM

## 2022-06-02 PROCEDURE — 3077F SYST BP >= 140 MM HG: CPT | Mod: CPTII,S$GLB,, | Performed by: INTERNAL MEDICINE

## 2022-06-02 PROCEDURE — 25000003 PHARM REV CODE 250: Performed by: PHYSICIAN ASSISTANT

## 2022-06-02 PROCEDURE — 99233 PR SUBSEQUENT HOSPITAL CARE,LEVL III: ICD-10-PCS | Mod: ,,, | Performed by: PHYSICIAN ASSISTANT

## 2022-06-02 PROCEDURE — 99291 CRITICAL CARE FIRST HOUR: CPT

## 2022-06-02 PROCEDURE — 3288F FALL RISK ASSESSMENT DOCD: CPT | Mod: CPTII,S$GLB,, | Performed by: INTERNAL MEDICINE

## 2022-06-02 PROCEDURE — 3288F PR FALLS RISK ASSESSMENT DOCUMENTED: ICD-10-PCS | Mod: CPTII,S$GLB,, | Performed by: INTERNAL MEDICINE

## 2022-06-02 PROCEDURE — 93000 ELECTROCARDIOGRAM COMPLETE: CPT | Mod: S$GLB,,, | Performed by: INTERNAL MEDICINE

## 2022-06-02 PROCEDURE — 99999 PR PBB SHADOW E&M-EST. PATIENT-LVL IV: CPT | Mod: PBBFAC,,, | Performed by: INTERNAL MEDICINE

## 2022-06-02 PROCEDURE — 1126F PR PAIN SEVERITY QUANTIFIED, NO PAIN PRESENT: ICD-10-PCS | Mod: CPTII,S$GLB,, | Performed by: INTERNAL MEDICINE

## 2022-06-02 PROCEDURE — 1159F MED LIST DOCD IN RCRD: CPT | Mod: CPTII,S$GLB,, | Performed by: INTERNAL MEDICINE

## 2022-06-02 PROCEDURE — 85025 COMPLETE CBC W/AUTO DIFF WBC: CPT | Performed by: PHYSICIAN ASSISTANT

## 2022-06-02 PROCEDURE — 93000 EKG 12-LEAD: ICD-10-PCS | Mod: S$GLB,,, | Performed by: INTERNAL MEDICINE

## 2022-06-02 PROCEDURE — C1785 PMKR, DUAL, RATE-RESP: HCPCS | Performed by: STUDENT IN AN ORGANIZED HEALTH CARE EDUCATION/TRAINING PROGRAM

## 2022-06-02 PROCEDURE — 25000003 PHARM REV CODE 250: Performed by: STUDENT IN AN ORGANIZED HEALTH CARE EDUCATION/TRAINING PROGRAM

## 2022-06-02 PROCEDURE — 27201423 OPTIME MED/SURG SUP & DEVICES STERILE SUPPLY: Performed by: STUDENT IN AN ORGANIZED HEALTH CARE EDUCATION/TRAINING PROGRAM

## 2022-06-02 PROCEDURE — 93005 ELECTROCARDIOGRAM TRACING: CPT

## 2022-06-02 PROCEDURE — 1160F PR REVIEW ALL MEDS BY PRESCRIBER/CLIN PHARMACIST DOCUMENTED: ICD-10-PCS | Mod: CPTII,S$GLB,, | Performed by: INTERNAL MEDICINE

## 2022-06-02 PROCEDURE — 63600175 PHARM REV CODE 636 W HCPCS: Performed by: NURSE ANESTHETIST, CERTIFIED REGISTERED

## 2022-06-02 PROCEDURE — 1101F PR PT FALLS ASSESS DOC 0-1 FALLS W/OUT INJ PAST YR: ICD-10-PCS | Mod: CPTII,S$GLB,, | Performed by: INTERNAL MEDICINE

## 2022-06-02 PROCEDURE — 3008F BODY MASS INDEX DOCD: CPT | Mod: CPTII,S$GLB,, | Performed by: INTERNAL MEDICINE

## 2022-06-02 PROCEDURE — 3051F PR MOST RECENT HEMOGLOBIN A1C LEVEL 7.0 - < 8.0%: ICD-10-PCS | Mod: CPTII,S$GLB,, | Performed by: INTERNAL MEDICINE

## 2022-06-02 PROCEDURE — 99233 SBSQ HOSP IP/OBS HIGH 50: CPT | Mod: ,,, | Performed by: PHYSICIAN ASSISTANT

## 2022-06-02 PROCEDURE — 3077F PR MOST RECENT SYSTOLIC BLOOD PRESSURE >= 140 MM HG: ICD-10-PCS | Mod: CPTII,S$GLB,, | Performed by: INTERNAL MEDICINE

## 2022-06-02 PROCEDURE — 63600175 PHARM REV CODE 636 W HCPCS: Performed by: STUDENT IN AN ORGANIZED HEALTH CARE EDUCATION/TRAINING PROGRAM

## 2022-06-02 PROCEDURE — 99999 PR PBB SHADOW E&M-EST. PATIENT-LVL IV: ICD-10-PCS | Mod: PBBFAC,,, | Performed by: INTERNAL MEDICINE

## 2022-06-02 PROCEDURE — 93010 EKG 12-LEAD: ICD-10-PCS | Mod: ,,, | Performed by: INTERNAL MEDICINE

## 2022-06-02 PROCEDURE — 85730 THROMBOPLASTIN TIME PARTIAL: CPT | Performed by: PHYSICIAN ASSISTANT

## 2022-06-02 PROCEDURE — 99205 PR OFFICE/OUTPT VISIT, NEW, LEVL V, 60-74 MIN: ICD-10-PCS | Mod: 25,S$GLB,, | Performed by: INTERNAL MEDICINE

## 2022-06-02 PROCEDURE — 1101F PT FALLS ASSESS-DOCD LE1/YR: CPT | Mod: CPTII,S$GLB,, | Performed by: INTERNAL MEDICINE

## 2022-06-02 PROCEDURE — D9220A PRA ANESTHESIA: ICD-10-PCS | Mod: CRNA,,, | Performed by: NURSE ANESTHETIST, CERTIFIED REGISTERED

## 2022-06-02 PROCEDURE — 84484 ASSAY OF TROPONIN QUANT: CPT | Performed by: PHYSICIAN ASSISTANT

## 2022-06-02 PROCEDURE — 99291 CRITICAL CARE FIRST HOUR: CPT | Mod: ,,, | Performed by: EMERGENCY MEDICINE

## 2022-06-02 PROCEDURE — 37000009 HC ANESTHESIA EA ADD 15 MINS: Performed by: STUDENT IN AN ORGANIZED HEALTH CARE EDUCATION/TRAINING PROGRAM

## 2022-06-02 PROCEDURE — 33208 INSRT HEART PM ATRIAL & VENT: CPT | Mod: KX,,, | Performed by: STUDENT IN AN ORGANIZED HEALTH CARE EDUCATION/TRAINING PROGRAM

## 2022-06-02 PROCEDURE — 99291 PR CRITICAL CARE, E/M 30-74 MINUTES: ICD-10-PCS | Mod: ,,, | Performed by: EMERGENCY MEDICINE

## 2022-06-02 PROCEDURE — 3078F DIAST BP <80 MM HG: CPT | Mod: CPTII,S$GLB,, | Performed by: INTERNAL MEDICINE

## 2022-06-02 PROCEDURE — 80053 COMPREHEN METABOLIC PANEL: CPT | Performed by: PHYSICIAN ASSISTANT

## 2022-06-02 PROCEDURE — D9220A PRA ANESTHESIA: Mod: CRNA,,, | Performed by: NURSE ANESTHETIST, CERTIFIED REGISTERED

## 2022-06-02 PROCEDURE — C9399 UNCLASSIFIED DRUGS OR BIOLOG: HCPCS | Performed by: PHYSICIAN ASSISTANT

## 2022-06-02 PROCEDURE — 4010F PR ACE/ARB THEARPY RXD/TAKEN: ICD-10-PCS | Mod: CPTII,S$GLB,, | Performed by: INTERNAL MEDICINE

## 2022-06-02 PROCEDURE — 3051F HG A1C>EQUAL 7.0%<8.0%: CPT | Mod: CPTII,S$GLB,, | Performed by: INTERNAL MEDICINE

## 2022-06-02 PROCEDURE — C1898 LEAD, PMKR, OTHER THAN TRANS: HCPCS | Performed by: STUDENT IN AN ORGANIZED HEALTH CARE EDUCATION/TRAINING PROGRAM

## 2022-06-02 PROCEDURE — D9220A PRA ANESTHESIA: Mod: ANES,,, | Performed by: ANESTHESIOLOGY

## 2022-06-02 PROCEDURE — 83735 ASSAY OF MAGNESIUM: CPT | Performed by: PHYSICIAN ASSISTANT

## 2022-06-02 PROCEDURE — 93010 ELECTROCARDIOGRAM REPORT: CPT | Mod: ,,, | Performed by: INTERNAL MEDICINE

## 2022-06-02 PROCEDURE — 33208 PR INSER HART PACER XVENOUS ATR/VENTR: ICD-10-PCS | Mod: KX,,, | Performed by: STUDENT IN AN ORGANIZED HEALTH CARE EDUCATION/TRAINING PROGRAM

## 2022-06-02 PROCEDURE — 3078F PR MOST RECENT DIASTOLIC BLOOD PRESSURE < 80 MM HG: ICD-10-PCS | Mod: CPTII,S$GLB,, | Performed by: INTERNAL MEDICINE

## 2022-06-02 PROCEDURE — 3008F PR BODY MASS INDEX (BMI) DOCUMENTED: ICD-10-PCS | Mod: CPTII,S$GLB,, | Performed by: INTERNAL MEDICINE

## 2022-06-02 PROCEDURE — 25000003 PHARM REV CODE 250: Performed by: NURSE ANESTHETIST, CERTIFIED REGISTERED

## 2022-06-02 PROCEDURE — D9220A PRA ANESTHESIA: ICD-10-PCS | Mod: ANES,,, | Performed by: ANESTHESIOLOGY

## 2022-06-02 PROCEDURE — 4010F ACE/ARB THERAPY RXD/TAKEN: CPT | Mod: CPTII,S$GLB,, | Performed by: INTERNAL MEDICINE

## 2022-06-02 PROCEDURE — 85610 PROTHROMBIN TIME: CPT | Performed by: PHYSICIAN ASSISTANT

## 2022-06-02 PROCEDURE — 37000008 HC ANESTHESIA 1ST 15 MINUTES: Performed by: STUDENT IN AN ORGANIZED HEALTH CARE EDUCATION/TRAINING PROGRAM

## 2022-06-02 PROCEDURE — 99223 1ST HOSP IP/OBS HIGH 75: CPT | Mod: GC,,, | Performed by: STUDENT IN AN ORGANIZED HEALTH CARE EDUCATION/TRAINING PROGRAM

## 2022-06-02 PROCEDURE — 20600001 HC STEP DOWN PRIVATE ROOM

## 2022-06-02 PROCEDURE — 25000003 PHARM REV CODE 250: Performed by: INTERNAL MEDICINE

## 2022-06-02 DEVICE — PULSE GENERATOR
Type: IMPLANTABLE DEVICE | Site: CHEST | Status: FUNCTIONAL
Brand: ASSURITY MRI™

## 2022-06-02 DEVICE — PACING LEAD
Type: IMPLANTABLE DEVICE | Site: HEART | Status: FUNCTIONAL
Brand: TENDRIL™

## 2022-06-02 RX ORDER — LOSARTAN POTASSIUM 25 MG/1
25 TABLET ORAL DAILY
Status: DISCONTINUED | OUTPATIENT
Start: 2022-06-03 | End: 2022-06-03 | Stop reason: HOSPADM

## 2022-06-02 RX ORDER — GLUCAGON 1 MG
1 KIT INJECTION
Status: DISCONTINUED | OUTPATIENT
Start: 2022-06-02 | End: 2022-06-03 | Stop reason: HOSPADM

## 2022-06-02 RX ORDER — AMLODIPINE BESYLATE 10 MG/1
10 TABLET ORAL DAILY
Status: DISCONTINUED | OUTPATIENT
Start: 2022-06-03 | End: 2022-06-02

## 2022-06-02 RX ORDER — ONDANSETRON 2 MG/ML
4 INJECTION INTRAMUSCULAR; INTRAVENOUS ONCE AS NEEDED
Status: DISCONTINUED | OUTPATIENT
Start: 2022-06-02 | End: 2022-06-03 | Stop reason: HOSPADM

## 2022-06-02 RX ORDER — ACETAMINOPHEN 325 MG/1
650 TABLET ORAL EVERY 4 HOURS PRN
Status: DISCONTINUED | OUTPATIENT
Start: 2022-06-02 | End: 2022-06-03 | Stop reason: HOSPADM

## 2022-06-02 RX ORDER — BISACODYL 10 MG
10 SUPPOSITORY, RECTAL RECTAL DAILY PRN
Status: DISCONTINUED | OUTPATIENT
Start: 2022-06-02 | End: 2022-06-03 | Stop reason: HOSPADM

## 2022-06-02 RX ORDER — MAG HYDROX/ALUMINUM HYD/SIMETH 200-200-20
30 SUSPENSION, ORAL (FINAL DOSE FORM) ORAL 4 TIMES DAILY PRN
Status: DISCONTINUED | OUTPATIENT
Start: 2022-06-02 | End: 2022-06-03 | Stop reason: HOSPADM

## 2022-06-02 RX ORDER — POLYETHYLENE GLYCOL 3350 17 G/17G
17 POWDER, FOR SOLUTION ORAL DAILY
Status: DISCONTINUED | OUTPATIENT
Start: 2022-06-02 | End: 2022-06-03 | Stop reason: HOSPADM

## 2022-06-02 RX ORDER — SODIUM CHLORIDE 0.9 % (FLUSH) 0.9 %
5 SYRINGE (ML) INJECTION
Status: DISCONTINUED | OUTPATIENT
Start: 2022-06-02 | End: 2022-06-03 | Stop reason: HOSPADM

## 2022-06-02 RX ORDER — MIDAZOLAM HYDROCHLORIDE 1 MG/ML
INJECTION, SOLUTION INTRAMUSCULAR; INTRAVENOUS
Status: DISCONTINUED | OUTPATIENT
Start: 2022-06-02 | End: 2022-06-02

## 2022-06-02 RX ORDER — IBUPROFEN 200 MG
24 TABLET ORAL
Status: DISCONTINUED | OUTPATIENT
Start: 2022-06-02 | End: 2022-06-03 | Stop reason: HOSPADM

## 2022-06-02 RX ORDER — TALC
6 POWDER (GRAM) TOPICAL NIGHTLY PRN
Status: DISCONTINUED | OUTPATIENT
Start: 2022-06-02 | End: 2022-06-03 | Stop reason: HOSPADM

## 2022-06-02 RX ORDER — VANCOMYCIN HYDROCHLORIDE 1 G/20ML
INJECTION, POWDER, LYOPHILIZED, FOR SOLUTION INTRAVENOUS
Status: DISCONTINUED | OUTPATIENT
Start: 2022-06-02 | End: 2022-06-03 | Stop reason: HOSPADM

## 2022-06-02 RX ORDER — ONDANSETRON 4 MG/1
8 TABLET, ORALLY DISINTEGRATING ORAL EVERY 8 HOURS PRN
Status: DISCONTINUED | OUTPATIENT
Start: 2022-06-02 | End: 2022-06-03 | Stop reason: HOSPADM

## 2022-06-02 RX ORDER — ACETAMINOPHEN 325 MG/1
650 TABLET ORAL EVERY 4 HOURS PRN
Status: DISCONTINUED | OUTPATIENT
Start: 2022-06-02 | End: 2022-06-02 | Stop reason: SDUPTHER

## 2022-06-02 RX ORDER — BUPIVACAINE HYDROCHLORIDE 2.5 MG/ML
INJECTION, SOLUTION EPIDURAL; INFILTRATION; INTRACAUDAL
Status: DISCONTINUED | OUTPATIENT
Start: 2022-06-02 | End: 2022-06-03 | Stop reason: HOSPADM

## 2022-06-02 RX ORDER — PROPOFOL 10 MG/ML
VIAL (ML) INTRAVENOUS CONTINUOUS PRN
Status: DISCONTINUED | OUTPATIENT
Start: 2022-06-02 | End: 2022-06-02

## 2022-06-02 RX ORDER — MAGNESIUM SULFATE HEPTAHYDRATE 40 MG/ML
2 INJECTION, SOLUTION INTRAVENOUS ONCE
Status: DISCONTINUED | OUTPATIENT
Start: 2022-06-02 | End: 2022-06-03 | Stop reason: HOSPADM

## 2022-06-02 RX ORDER — PROCHLORPERAZINE EDISYLATE 5 MG/ML
5 INJECTION INTRAMUSCULAR; INTRAVENOUS EVERY 6 HOURS PRN
Status: DISCONTINUED | OUTPATIENT
Start: 2022-06-02 | End: 2022-06-03 | Stop reason: HOSPADM

## 2022-06-02 RX ORDER — LIDOCAINE HYDROCHLORIDE 10 MG/ML
INJECTION, SOLUTION INTRAVENOUS
Status: DISCONTINUED | OUTPATIENT
Start: 2022-06-02 | End: 2022-06-02

## 2022-06-02 RX ORDER — FENTANYL CITRATE 50 UG/ML
25 INJECTION, SOLUTION INTRAMUSCULAR; INTRAVENOUS EVERY 5 MIN PRN
Status: DISCONTINUED | OUTPATIENT
Start: 2022-06-02 | End: 2022-06-03 | Stop reason: HOSPADM

## 2022-06-02 RX ORDER — HYDROMORPHONE HYDROCHLORIDE 1 MG/ML
0.2 INJECTION, SOLUTION INTRAMUSCULAR; INTRAVENOUS; SUBCUTANEOUS EVERY 5 MIN PRN
Status: DISCONTINUED | OUTPATIENT
Start: 2022-06-02 | End: 2022-06-03 | Stop reason: HOSPADM

## 2022-06-02 RX ORDER — ZOLPIDEM TARTRATE 5 MG/1
5 TABLET ORAL NIGHTLY PRN
Status: DISCONTINUED | OUTPATIENT
Start: 2022-06-02 | End: 2022-06-03 | Stop reason: HOSPADM

## 2022-06-02 RX ORDER — ACETAMINOPHEN 500 MG
1000 TABLET ORAL EVERY 8 HOURS PRN
Status: DISCONTINUED | OUTPATIENT
Start: 2022-06-02 | End: 2022-06-03 | Stop reason: HOSPADM

## 2022-06-02 RX ORDER — DULOXETIN HYDROCHLORIDE 20 MG/1
20 CAPSULE, DELAYED RELEASE ORAL DAILY
Status: DISCONTINUED | OUTPATIENT
Start: 2022-06-03 | End: 2022-06-02

## 2022-06-02 RX ORDER — FENTANYL CITRATE 50 UG/ML
INJECTION, SOLUTION INTRAMUSCULAR; INTRAVENOUS
Status: DISCONTINUED | OUTPATIENT
Start: 2022-06-02 | End: 2022-06-02

## 2022-06-02 RX ORDER — LIDOCAINE HYDROCHLORIDE 20 MG/ML
INJECTION, SOLUTION EPIDURAL; INFILTRATION; INTRACAUDAL; PERINEURAL
Status: DISCONTINUED | OUTPATIENT
Start: 2022-06-02 | End: 2022-06-03 | Stop reason: HOSPADM

## 2022-06-02 RX ORDER — NALOXONE HCL 0.4 MG/ML
0.02 VIAL (ML) INJECTION
Status: DISCONTINUED | OUTPATIENT
Start: 2022-06-02 | End: 2022-06-03 | Stop reason: HOSPADM

## 2022-06-02 RX ORDER — DIPHENHYDRAMINE HYDROCHLORIDE 50 MG/ML
25 INJECTION INTRAMUSCULAR; INTRAVENOUS EVERY 6 HOURS PRN
Status: DISCONTINUED | OUTPATIENT
Start: 2022-06-02 | End: 2022-06-03 | Stop reason: HOSPADM

## 2022-06-02 RX ORDER — AMLODIPINE BESYLATE 10 MG/1
10 TABLET ORAL DAILY
Status: DISCONTINUED | OUTPATIENT
Start: 2022-06-03 | End: 2022-06-03 | Stop reason: HOSPADM

## 2022-06-02 RX ORDER — SODIUM CHLORIDE 0.9 % (FLUSH) 0.9 %
10 SYRINGE (ML) INJECTION
Status: DISCONTINUED | OUTPATIENT
Start: 2022-06-02 | End: 2022-06-03 | Stop reason: HOSPADM

## 2022-06-02 RX ORDER — DOXYCYCLINE HYCLATE 100 MG
100 TABLET ORAL EVERY 12 HOURS
Status: DISCONTINUED | OUTPATIENT
Start: 2022-06-02 | End: 2022-06-03 | Stop reason: HOSPADM

## 2022-06-02 RX ORDER — LOSARTAN POTASSIUM 25 MG/1
25 TABLET ORAL DAILY
Status: DISCONTINUED | OUTPATIENT
Start: 2022-06-03 | End: 2022-06-02

## 2022-06-02 RX ORDER — IPRATROPIUM BROMIDE AND ALBUTEROL SULFATE 2.5; .5 MG/3ML; MG/3ML
3 SOLUTION RESPIRATORY (INHALATION) EVERY 4 HOURS PRN
Status: DISCONTINUED | OUTPATIENT
Start: 2022-06-02 | End: 2022-06-03 | Stop reason: HOSPADM

## 2022-06-02 RX ORDER — SIMETHICONE 80 MG
1 TABLET,CHEWABLE ORAL 4 TIMES DAILY PRN
Status: DISCONTINUED | OUTPATIENT
Start: 2022-06-02 | End: 2022-06-03 | Stop reason: HOSPADM

## 2022-06-02 RX ORDER — INSULIN ASPART 100 [IU]/ML
0-5 INJECTION, SOLUTION INTRAVENOUS; SUBCUTANEOUS
Status: DISCONTINUED | OUTPATIENT
Start: 2022-06-02 | End: 2022-06-03 | Stop reason: HOSPADM

## 2022-06-02 RX ORDER — PROPOFOL 10 MG/ML
VIAL (ML) INTRAVENOUS
Status: DISCONTINUED | OUTPATIENT
Start: 2022-06-02 | End: 2022-06-02

## 2022-06-02 RX ORDER — SODIUM CHLORIDE 0.9 G/100ML
IRRIGANT IRRIGATION
Status: DISCONTINUED | OUTPATIENT
Start: 2022-06-02 | End: 2022-06-03 | Stop reason: HOSPADM

## 2022-06-02 RX ORDER — CEFAZOLIN SODIUM 1 G/3ML
INJECTION, POWDER, FOR SOLUTION INTRAMUSCULAR; INTRAVENOUS
Status: DISCONTINUED | OUTPATIENT
Start: 2022-06-02 | End: 2022-06-02

## 2022-06-02 RX ORDER — DULOXETIN HYDROCHLORIDE 20 MG/1
20 CAPSULE, DELAYED RELEASE ORAL DAILY
Status: DISCONTINUED | OUTPATIENT
Start: 2022-06-02 | End: 2022-06-03 | Stop reason: HOSPADM

## 2022-06-02 RX ORDER — IBUPROFEN 200 MG
16 TABLET ORAL
Status: DISCONTINUED | OUTPATIENT
Start: 2022-06-02 | End: 2022-06-03 | Stop reason: HOSPADM

## 2022-06-02 RX ADMIN — PROPOFOL 50 MG: 10 INJECTION, EMULSION INTRAVENOUS at 03:06

## 2022-06-02 RX ADMIN — SODIUM CHLORIDE, SODIUM GLUCONATE, SODIUM ACETATE, POTASSIUM CHLORIDE, MAGNESIUM CHLORIDE, SODIUM PHOSPHATE, DIBASIC, AND POTASSIUM PHOSPHATE: .53; .5; .37; .037; .03; .012; .00082 INJECTION, SOLUTION INTRAVENOUS at 03:06

## 2022-06-02 RX ADMIN — SODIUM CHLORIDE, SODIUM GLUCONATE, SODIUM ACETATE, POTASSIUM CHLORIDE, MAGNESIUM CHLORIDE, SODIUM PHOSPHATE, DIBASIC, AND POTASSIUM PHOSPHATE: .53; .5; .37; .037; .03; .012; .00082 INJECTION, SOLUTION INTRAVENOUS at 04:06

## 2022-06-02 RX ADMIN — MIDAZOLAM HYDROCHLORIDE 1 MG: 1 INJECTION, SOLUTION INTRAMUSCULAR; INTRAVENOUS at 04:06

## 2022-06-02 RX ADMIN — INSULIN DETEMIR 10 UNITS: 100 INJECTION, SOLUTION SUBCUTANEOUS at 10:06

## 2022-06-02 RX ADMIN — LIDOCAINE HYDROCHLORIDE 25 MG: 10 INJECTION, SOLUTION INTRAVENOUS at 03:06

## 2022-06-02 RX ADMIN — DULOXETINE HYDROCHLORIDE 20 MG: 20 CAPSULE, DELAYED RELEASE ORAL at 10:06

## 2022-06-02 RX ADMIN — Medication 50 MCG/KG/MIN: at 03:06

## 2022-06-02 RX ADMIN — FENTANYL CITRATE 25 MCG: 50 INJECTION, SOLUTION INTRAMUSCULAR; INTRAVENOUS at 04:06

## 2022-06-02 RX ADMIN — MIDAZOLAM HYDROCHLORIDE 1 MG: 1 INJECTION, SOLUTION INTRAMUSCULAR; INTRAVENOUS at 03:06

## 2022-06-02 RX ADMIN — ZOLPIDEM TARTRATE 5 MG: 5 TABLET ORAL at 10:06

## 2022-06-02 RX ADMIN — FENTANYL CITRATE 25 MCG: 50 INJECTION, SOLUTION INTRAMUSCULAR; INTRAVENOUS at 03:06

## 2022-06-02 RX ADMIN — DOXYCYCLINE HYCLATE 100 MG: 100 TABLET, COATED ORAL at 08:06

## 2022-06-02 RX ADMIN — ACETAMINOPHEN 1000 MG: 500 TABLET ORAL at 08:06

## 2022-06-02 RX ADMIN — CEFAZOLIN SODIUM 2 G: 1 INJECTION, POWDER, FOR SOLUTION INTRAMUSCULAR; INTRAVENOUS at 03:06

## 2022-06-02 NOTE — ASSESSMENT & PLAN NOTE
Pt symptomatic w/ dodge, presyncope, and decreased exercise tolerance.  Pt w/ narrow complex QRS and hemodynamically stable.    - repeat ECG  - pt to go to ED for EP eval for possible PPM  - check echo to eval cardiac function while in hospital

## 2022-06-02 NOTE — ASSESSMENT & PLAN NOTE
Patient presented at cardiology's recommendation for bradycardia. She is asymptomatic- no weakness, dizziness, loss of consciousness   Appreciate EP recs:   HD stable. CHB noted 2 weeks prior. Seen in cardiology clinic today and sent to [facility] for PM implantation. TTE with normal EF. Patient NPO except water this AM. Not on OAC. No history of breast cancer or chest wall radiation. No h/o AVFs.  - Left sided dual chamber pacemaker this afternoon  - NO HEPARIN PRODUCTS FOR 5 DAYS POST-PACEMAKER  - Plan for doxycyline 100 mg PO BID x 5 days post-procedure  - EP to follow up in the AM

## 2022-06-02 NOTE — ED PROVIDER NOTES
Encounter Date: 6/2/2022       History     Chief Complaint   Patient presents with     Referral     From [facility], needs a pacemaker placement     72-year-old female with history of asthma, diabetes, GERD presents ER for evaluation of abnormal EKG.  Was sent in from Cardiology for complete heart block.  Patient states that she underwent colonoscopy 2 weeks ago and was found to have a low heart rate.  She had an appointment with her cardiologist today who sent her to the ER for further evaluation and possible pacemaker placement.  Patient reports she has had some lightheadedness and dizziness throughout the last several weeks but she did not know why.  She denies any chest pain, palpitations.  No prior cardiac history including stent placement.  Does not use any blood thinners.  She is a nonsmoker.    The history is provided by the patient.     Review of patient's allergies indicates:   Allergen Reactions    Sulfa (sulfonamide antibiotics) Nausea Only    Statins-hmg-coa reductase inhibitors     Sulfabenzamide      Past Medical History:   Diagnosis Date    Asthma     Diabetes mellitus     GERD (gastroesophageal reflux disease)     Insomnia     Muscle spasm     Restless leg      Past Surgical History:   Procedure Laterality Date    BLADDER SUSPENSION      CHOLECYSTECTOMY      COLONOSCOPY      COLONOSCOPY N/A 5/16/2022    Procedure: COLONOSCOPY;  Surgeon: Mercedes Lees MD;  Location: [facility];  Service: Endoscopy;  Laterality: N/A;    incisional hernia repair      UPPER GASTROINTESTINAL ENDOSCOPY       Family History   Problem Relation Age of Onset    Breast cancer Neg Hx     Ovarian cancer Neg Hx     Cervical cancer Neg Hx     Endometrial cancer Neg Hx     Vaginal cancer Neg Hx      Social History     Tobacco Use    Smoking status: Never Smoker    Smokeless tobacco: Never Used   Substance Use Topics    Alcohol use: No    Drug use: Not Currently     Review of Systems    Constitutional: Negative for chills and fever.   Eyes: Negative for visual disturbance.   Respiratory: Negative for shortness of breath.    Cardiovascular: Negative for chest pain, palpitations and leg swelling.   Gastrointestinal: Negative for nausea and vomiting.   Genitourinary: Negative for dysuria and flank pain.   Musculoskeletal: Negative for myalgias.   Skin: Negative for rash.   Allergic/Immunologic: Negative for immunocompromised state.   Neurological: Positive for light-headedness. Negative for syncope, weakness and numbness.   Hematological: Does not bruise/bleed easily.   Psychiatric/Behavioral: Negative for confusion.       Physical Exam     Initial Vitals [06/02/22 1257]   BP Pulse Resp Temp SpO2   137/66 (!) 48 18 98.6 °F (37 °C) 97 %      MAP       --         Physical Exam    Vitals reviewed.  Constitutional: She appears well-developed and well-nourished. She is not diaphoretic. No distress.   HENT:   Head: Normocephalic and atraumatic.   Eyes: Conjunctivae and EOM are normal.   Neck: Neck supple.   Cardiovascular: Intact distal pulses and normal pulses. Bradycardia present.    Pulmonary/Chest: Breath sounds normal. No respiratory distress.   Abdominal: Abdomen is soft. She exhibits no distension. There is no abdominal tenderness.   Musculoskeletal:         General: Normal range of motion.      Cervical back: Neck supple.     Neurological: She is alert and oriented to person, place, and time. She has normal strength.   Skin: Skin is warm.         ED Course   Procedures  Labs Reviewed   B-TYPE NATRIURETIC PEPTIDE - Abnormal; Notable for the following components:       Result Value     (*)     All other components within normal limits   CBC W/ AUTO DIFFERENTIAL - Abnormal; Notable for the following components:    Hematocrit 36.4 (*)     All other components within normal limits   COMPREHENSIVE METABOLIC PANEL - Abnormal; Notable for the following components:    Alkaline Phosphatase 38 (*)      eGFR if  58.0 (*)     eGFR if non  50.3 (*)     All other components within normal limits   MAGNESIUM   TROPONIN I   APTT   PROTIME-INR   TSH        ECG Results          EKG 12-lead (Final result)  Result time 06/02/22 14:21:25    Final result by Interface, Lab In [facility] (06/02/22 14:21:25)                 Narrative:    Test Reason : R00.1,    Vent. Rate : 048 BPM     Atrial Rate : 077 BPM     P-R Int : 000 ms          QRS Dur : 116 ms      QT Int : 490 ms       P-R-T Axes : 048 -74 034 degrees     QTc Int : 437 ms    Sinus rhythm with complete heart block and Junctional rhythm with  occasional Premature ventricular complexes  Left anterior fascicular block  Low voltage QRS  Right bundle branch block   Bifascicular block   Abnormal ECG  When compared with ECG of 02-JUN-2022 12:02,  Fusion complexes are no longer Present  Premature ventricular complexes are now Present  Questionable change in initial forces of Anterior leads  QT has lengthened  Confirmed by Bernabe MICHAEL, Nik TATUM ([id number]) on 6/2/2022 2:21:09 PM    Referred By: [email or web address]   SELF           Confirmed By:Nik Kidd MD                            Imaging Results          X-Ray Chest AP Portable (Final result)  Result time 06/02/22 14:51:45    Final result by Julio Amaya MD (06/02/22 14:51:45)                 Impression:      1. No acute radiographic findings in the chest on this single view.      Electronically signed by: Julio Amaya MD  Date:    06/02/2022  Time:    14:51             Narrative:    EXAMINATION:  XR CHEST AP PORTABLE    CLINICAL HISTORY:  Abnormal electrocardiogram (ECG) (EKG)    TECHNIQUE:  Single frontal view of the chest was performed.    COMPARISON:  Radiograph 08/30/2018.    FINDINGS:  Defibrillator pads project over the left chest.  Cardiac monitoring leads project over the bilateral hemithoraces.  Mediastinal structures are midline.  Cardiac silhouette is magnified by AP technique.  Lung  volumes are symmetric.  No consolidation.  No pneumothorax or pleural effusion.  No free air beneath the diaphragm.  No acute osseous abnormalities.  Visualized soft tissues are within normal limits.                                 Medications   fentaNYL 50 mcg/mL injection 25 mcg (has no administration in time range)   HYDROmorphone injection 0.2 mg (has no administration in time range)   ondansetron injection 4 mg (has no administration in time range)   diphenhydrAMINE injection 25 mg (has no administration in time range)   sodium chloride 0.9% flush 10 mL (has no administration in time range)   melatonin tablet 6 mg (has no administration in time range)   magnesium sulfate 2g in water 50mL IVPB (premix) (has no administration in time range)   acetaminophen tablet 650 mg (has no administration in time range)   doxycycline tablet 100 mg (100 mg Oral Given 6/2/22 2058)   amLODIPine tablet 10 mg (has no administration in time range)   losartan tablet 25 mg (has no administration in time range)   sodium chloride 0.9% flush 5 mL (has no administration in time range)   albuterol-ipratropium 2.5 mg-0.5 mg/3 mL nebulizer solution 3 mL (has no administration in time range)   ondansetron disintegrating tablet 8 mg (has no administration in time range)   prochlorperazine injection Soln 5 mg (has no administration in time range)   polyethylene glycol packet 17 g (has no administration in time range)   bisacodyL suppository 10 mg (has no administration in time range)   simethicone chewable tablet 80 mg (has no administration in time range)   aluminum-magnesium hydroxide-simethicone 200-200-20 mg/5 mL suspension 30 mL (has no administration in time range)   acetaminophen tablet 1,000 mg (1,000 mg Oral Given 6/2/22 2058)   naloxone 0.4 mg/mL injection 0.02 mg (has no administration in time range)   glucose chewable tablet 16 g (has no administration in time range)   glucose chewable tablet 24 g (has no administration in time  range)   glucagon (human recombinant) injection 1 mg (has no administration in time range)   dextrose 10% bolus 125 mL (has no administration in time range)   dextrose 10% bolus 250 mL (has no administration in time range)   insulin aspart U-100 pen 0-5 Units (has no administration in time range)   sodium chloride 0.9% irrigation (1,000 mLs Irrigation Given 6/2/22 1538)   vancomycin injection (1,000 mg Irrigation Given 6/2/22 1538)   BUPivacaine (PF) 0.25% (2.5 mg/ml) injection (10 mLs Subcutaneous Given 6/2/22 1606)   LIDOcaine (PF) 20 mg/ml (2%) injection (10 mLs Subcutaneous Given 6/2/22 1607)   insulin detemir U-100 pen 10 Units (10 Units Subcutaneous Given 6/2/22 2204)   zolpidem tablet 5 mg (5 mg Oral Given 6/2/22 2204)   DULoxetine DR capsule 20 mg (20 mg Oral Given 6/2/22 2204)           APC / Resident Notes:   Patient seen in the ER promptly upon arrival.  She is afebrile.  She was placed on cardiac monitor.  EKG shows sinus rhythm with complete heart block and junctional occasional PVC at a rate of 40 beats per minute.   MS Pacer pads applied.  Patient alert and oriented x4.  Blood pressure stable.    Discussed with Cardiology who will evaluate patient in the ED.    Laboratory studies obtained.  Patient was made NPO.  Lab work shows normal white count of 4.9.  Hemoglobin is stable.  Chemistries unremarkable.  Cardiac workup reveals elevated BNP otherwise troponin normal.  X-ray of chest is unremarkable.  After cardiology evaluation the decision for pacemaker placement was made.  Patient did consent to pacemaker placement.  She was taken to the OR from the emergency room.      Discussed with Hospital Medicine for primary admission of patient.  Patient remained stable during her stay in the ED. The care of this patient was overseen by attending physician who agrees with treatment, plan, and disposition.    Disclaimer: This note has been generated using voice-recognition software. There may be  typographical errors that have been missed during proof-reading.         Attending Attestation:     Physician Attestation Statement for NP/PA:   I have conducted a face to face encounter with this patient in addition to the NP/PA, due to Medical Complexity    Other NP/PA Attestation Additions:      Medical Decision Making: Complete heart block present on EKG, relatively asymptomatic.  Good BP in ED.  Pacer pads placed on patient.  Cardiology plans immediate pacemaker placement.             ED Course as of 06/02/22 2219   Thu Jun 02, 2022   1435 Cardiology has evaluated patient in ED. Bedside echo- consent for pacemaker. [AJ]      ED Course User Index  [AJ] Kimberlee Wasserman PA-C           Critical Care   Date: 06/03/2022  Performed by: Filiberto Bowen MD   Authorized by: Filiberto Bowen MD    Total critical care time (exclusive of procedural time) : 30 minutes  Critical care was necessary to treat or prevent imminent or life-threatening deterioration of the following conditions:  Complete heart block    Clinical Impression:   Final diagnoses:  [R00.1] Bradycardia  [R94.31] Abnormal EKG  [I44.2] Complete heart block (Primary)          ED Disposition Condition    Admit               Kimberlee Wasserman PA-C  06/02/22 2220       Filiberto Bowen MD  06/03/22 6538

## 2022-06-02 NOTE — CONSULTS
[facility] - Emergency Dept  Cardiology  Consult Note    Patient Name: Morelia Holder  MRN: [id number]  Admission Date: 6/2/2022  Hospital Length of Stay: 0 days  Code Status: Prior   Attending Provider: Montse Yadav MD   Consulting Provider: Hansel Shukla MD  Primary Care Physician: Pavel Moreau MD  Principal Problem:Complete heart block    Patient information was obtained from patient and ER records.     Inpatient consult to Cardiology  Consult performed by: Hansel Shukla MD  Consult ordered by: Kimberlee Wasserman PA-C        Subjective:     Chief Complaint:  CHB     HPI:   Ms. Holder is a 73 yo F with obesity, DM, hypothyroidism, and HTN who presents for cardiology clinic (Dr. Witt) for CHB. This was first diagnosed 2 weeks prior during a surveillance colonoscopy. She was sent home to follow up with cardiology which was today. She reports generalized fatigue, MENDEZ, and decrease exercise tolerance. Denies angina or CHF symptoms. Hgb wnl. Cr 1.1. TSH pending. Echo with normal EF. ECG shows CHB with incomplete RBBB.        Past Medical History:   Diagnosis Date    Asthma     Diabetes mellitus     GERD (gastroesophageal reflux disease)     Insomnia     Muscle spasm     Restless leg        Past Surgical History:   Procedure Laterality Date    BLADDER SUSPENSION      CHOLECYSTECTOMY      COLONOSCOPY      COLONOSCOPY N/A 5/16/2022    Procedure: COLONOSCOPY;  Surgeon: Mercedes Lees MD;  Location: [facility];  Service: Endoscopy;  Laterality: N/A;    incisional hernia repair      UPPER GASTROINTESTINAL ENDOSCOPY         Review of patient's allergies indicates:   Allergen Reactions    Sulfa (sulfonamide antibiotics) Nausea Only    Statins-hmg-coa reductase inhibitors     Sulfabenzamide        No current facility-administered medications on file prior to encounter.     Current Outpatient Medications on File Prior to Encounter   Medication Sig    amLODIPine (NORVASC) 10 MG tablet  Take 10 mg by mouth once daily.    blood sugar diagnostic (CONTOUR NEXT TEST STRIPS MISC) Contour Next Test Strips    colestipol (COLESTID) 1 gram Tab     DULoxetine (CYMBALTA) 20 MG capsule Take 20 mg by mouth once daily.    insulin glargine 100 units/mL (3mL) SubQ pen Inject 20 Units into the skin every evening.    lancets (MICROLET LANCET MISC) Microlet Lancet   TEST TWICE A DAY    losartan (COZAAR) 25 MG tablet Take 25 mg by mouth once daily.    meloxicam (MOBIC) 7.5 MG tablet Take 7.5 mg by mouth once daily.    metFORMIN (GLUCOPHAGE) 500 MG tablet Take 1,000 mg by mouth 2 (two) times daily with meals.     multivit-calc-iron-FA-K-hb#244 -80 mg-mcg-mcg Tab [employer]   Take one tablet by mouth every day.    vitamin B complex 100  2-herbs 100 mg Tab vitamin B complex   Take one tablet by mouth every day.    zolpidem (AMBIEN) 10 mg Tab Take 10 mg by mouth nightly as needed.     Family History    None       Tobacco Use    Smoking status: Never Smoker    Smokeless tobacco: Never Used   Substance and Sexual Activity    Alcohol use: No    Drug use: Not Currently    Sexual activity: Not Currently     Review of Systems   All other systems reviewed and are negative.  Objective:     Vital Signs (Most Recent):  Temp: 98.6 °F (37 °C) (06/02/22 1257)  Pulse: (!) 44 (06/02/22 1457)  Resp: 18 (06/02/22 1341)  BP: (!) 177/84 (06/02/22 1341)  SpO2: 95 % (06/02/22 1457)   Vital Signs (24h Range):  Temp:  [98.6 °F (37 °C)] 98.6 °F (37 °C)  Pulse:  [44-48] 44  Resp:  [18] 18  SpO2:  [95 %-98 %] 95 %  BP: (137-177)/(66-84) 177/84     Weight: 104.8 kg (231 lb)  Body mass index is 37.28 kg/m².    SpO2: 95 %  O2 Device (Oxygen Therapy): room air    No intake or output data in the 24 hours ending 06/02/22 1508    Lines/Drains/Airways       Peripheral Intravenous Line  Duration                  Peripheral IV - Single Lumen 06/02/22 1357 20 G Right Antecubital <1 day                    Physical  Exam  General: NAD. AAO  HENT: EOMI  Neck: supple. No JVD  CV: bradycardic. Normal S1/S2. No gallops, rubs, or murmurs. 2+ radial pulses  Resp: CTAB. No increased work of breathing  Ext: warm. No edema.      Significant Labs: All pertinent lab results from the last 24 hours have been reviewed.    Significant Imaging:  Reviewed    Assessment and Plan:     * Complete heart block  HD stable. CHB noted 2 weeks prior. Seen in cardiology clinic today and sent to [facility] for PM implantation. TTE with normal EF. Patient NPO except water this AM. Not on OAC. No history of breast cancer or chest wall radiation. No h/o AVFs.  - Left sided dual chamber pacemaker this afternoon  - NO HEPARIN PRODUCTS FOR 5 DAYS POST-PACEMAKER  - Plan for doxycyline 100 mg PO BID x 5 days post-procedure  - EP to follow up in the AM        VTE Risk Mitigation (From admission, onward)    None          Thank you for your consult. I will follow-up with patient. Please contact us if you have any additional questions.    Hansel Shukla MD  Cardiology   [facility] - Emergency Dept

## 2022-06-02 NOTE — HPI
Morelia Holder is a 72F with history of asthma, diabetes, GERD who presents from her cardiologists appointment for bradycardia. A few weeks ago she had a colonoscopy and was noted to be bradycardic. She has never lost consciousness or felt faint. After her appointment with cardiology she came straight to the ER. She has no chest pain, dizziness, palpitations, weakness. She feels like her normal self.     In ED: EKG shows complete heart block, EP consulted and patient taken for PPM placement

## 2022-06-02 NOTE — HPI
Ms. Holder is a 71 yo F with obesity, DM, hypothyroidism, and HTN who presents for cardiology clinic (Dr. Witt) for CHB. This was first diagnosed 2 weeks prior during a surveillance colonoscopy. She was sent home to follow up with cardiology which was today. She reports generalized fatigue, MENDEZ, and decrease exercise tolerance. Denies angina or CHF symptoms. Hgb wnl. Cr 1.1. TSH pending. Echo with normal EF. ECG shows CHB with incomplete RBBB.

## 2022-06-02 NOTE — DISCHARGE INSTRUCTIONS
Sling to left arm - wear for 24 hours post-op, then only at night for 6 weeks.  NO HEPARIN PRODUCTS  Doxycycline 100 mg PO BID for 5 days at discharge  No lifting left elbow above shoulder height  No lifting over 5 pounds  No driving for 1 week and for 4 weeks if patient uses left arm to make turns  Patient may shower in 24 hours, do not let beam of shower hit site directly and no scrubbing in area  Follow up in device clinic in 1 week and with follow up visit with Dr. Marie in 4 months.

## 2022-06-02 NOTE — ANESTHESIA PREPROCEDURE EVALUATION
06/02/2022  Morelia Holder is a 72 y.o., female.  Patient Active Problem List   Diagnosis    RUQ abdominal pain    Nausea & vomiting    Rectocele    GERD (gastroesophageal reflux disease)    Hypothyroidism    Diaphoresis    Complete AV block    Vaginal atrophy    Enterocele    Mixed stress and urge urinary incontinence    Nocturia more than twice per night    Irregular bowel habits    Groin rash    Severe obesity (BMI 35.0-39.9) with comorbidity    Diabetes mellitus without complication    Symptomatic bradycardia           Pre-op Assessment          Review of Systems      Physical Exam    Airway:  No airway management difficulties anticipated  Dental:No active dental issues noted  Chest/Lungs:  Clear to auscultation    Heart:  Rate: Normal  Rhythm: Regular Rhythm  Sounds: Normal        Anesthesia Plan  Type of Anesthesia, risks & benefits discussed:    Anesthesia Type: Gen Natural Airway  Informed Consent: Informed consent signed with the Patient and all parties understand the risks and agree with anesthesia plan.  All questions answered.   ASA Score: 3 Emergent  Anesthesia Plan Notes: Chart reviewed. Patient seen and examined. Anesthesia plan discussed and questions answered. E-consent signed. Festus Stapleton MD    Ready For Surgery From Anesthesia Perspective.     .

## 2022-06-02 NOTE — H&P
[facility]  History & Physical    Patient Name: Morelia Holder  MRN: [id number]  Patient Class: IP- Inpatient  Admission Date: 6/2/2022  Attending Physician: Montse Yadav MD   Primary Care Provider: Pavel Moreau MD         Patient information was obtained from patient, past medical records and ER records.     Subjective:     Principal Problem:Complete heart block    Chief Complaint:   Chief Complaint   Patient presents with    Dr. Referral     From [facility], needs a pacemaker placement        HPI: Morelia Holder is a 72F with history of asthma, diabetes, GERD who presents from her cardiologists appointment for bradycardia. A few weeks ago she had a colonoscopy and was noted to be bradycardic. She has never lost consciousness or felt faint. After her appointment with cardiology she came straight to the ER. She has no chest pain, dizziness, palpitations, weakness. She feels like her normal self.     In ED: EKG shows complete heart block, EP consulted and patient taken for PPM placement       Past Medical History:   Diagnosis Date    Asthma     Diabetes mellitus     GERD (gastroesophageal reflux disease)     Insomnia     Muscle spasm     Restless leg        Past Surgical History:   Procedure Laterality Date    BLADDER SUSPENSION      CHOLECYSTECTOMY      COLONOSCOPY      COLONOSCOPY N/A 5/16/2022    Procedure: COLONOSCOPY;  Surgeon: Mercedes Lees MD;  Location: [facility];  Service: Endoscopy;  Laterality: N/A;    incisional hernia repair      UPPER GASTROINTESTINAL ENDOSCOPY         Review of patient's allergies indicates:   Allergen Reactions    Sulfa (sulfonamide antibiotics) Nausea Only    Statins-hmg-coa reductase inhibitors     Sulfabenzamide        No current facility-administered medications on file prior to encounter.     Current Outpatient Medications on File Prior to Encounter   Medication Sig    amLODIPine (NORVASC) 10 MG  tablet Take 10 mg by mouth once daily.    blood sugar diagnostic (CONTOUR NEXT TEST STRIPS MISC) Contour Next Test Strips    colestipol (COLESTID) 1 gram Tab     DULoxetine (CYMBALTA) 20 MG capsule Take 20 mg by mouth once daily.    insulin glargine 100 units/mL (3mL) SubQ pen Inject 20 Units into the skin every evening.    lancets (MICROLET LANCET MISC) Microlet Lancet   TEST TWICE A DAY    losartan (COZAAR) 25 MG tablet Take 25 mg by mouth once daily.    meloxicam (MOBIC) 7.5 MG tablet Take 7.5 mg by mouth once daily.    metFORMIN (GLUCOPHAGE) 500 MG tablet Take 1,000 mg by mouth 2 (two) times daily with meals.     multivit-calc-iron-FA-K-hb#244 -80 mg-mcg-mcg Tab [employer]   Take one tablet by mouth every day.    vitamin B complex 100  2-herbs 100 mg Tab vitamin B complex   Take one tablet by mouth every day.    zolpidem (AMBIEN) 10 mg Tab Take 10 mg by mouth nightly as needed.     Family History    None       Tobacco Use    Smoking status: Never Smoker    Smokeless tobacco: Never Used   Substance and Sexual Activity    Alcohol use: No    Drug use: Not Currently    Sexual activity: Not Currently     Review of Systems   Constitutional:  Negative for diaphoresis, fatigue and fever.   Respiratory:  Negative for cough and shortness of breath.    Cardiovascular:  Negative for chest pain, palpitations and leg swelling.   Gastrointestinal:  Negative for nausea and vomiting.   Genitourinary:  Negative for difficulty urinating and dysuria.   Musculoskeletal:  Negative for arthralgias and gait problem.   Skin:  Negative for color change and pallor.   Neurological:  Negative for dizziness, syncope, light-headedness and headaches.   Psychiatric/Behavioral:  Negative for agitation and confusion. The patient is not nervous/anxious.    Objective:     Vital Signs (Most Recent):  Temp: 98.6 °F (37 °C) (06/02/22 1257)  Pulse: (!) 44 (06/02/22 1457)  Resp: 18 (06/02/22 1341)  BP: (!) 171/84  (06/02/22 1457)  SpO2: 95 % (06/02/22 1457)   Vital Signs (24h Range):  Temp:  [98.6 °F (37 °C)] 98.6 °F (37 °C)  Pulse:  [44-48] 44  Resp:  [18] 18  SpO2:  [95 %-98 %] 95 %  BP: (137-177)/(66-84) 171/84     Weight: 104.8 kg (231 lb)  Body mass index is 37.28 kg/m².    Physical Exam  Constitutional:       General: She is not in acute distress.     Appearance: Normal appearance. She is not ill-appearing.   HENT:      Head: Normocephalic and atraumatic.   Eyes:      Extraocular Movements: Extraocular movements intact.   Cardiovascular:      Rate and Rhythm: Regular rhythm. Bradycardia present.      Pulses: Normal pulses.   Pulmonary:      Effort: Pulmonary effort is normal. No respiratory distress.      Breath sounds: No wheezing.   Abdominal:      General: Abdomen is flat. There is no distension.      Palpations: Abdomen is soft.   Musculoskeletal:      Right lower leg: No edema.      Left lower leg: No edema.   Skin:     General: Skin is warm and dry.   Neurological:      General: No focal deficit present.      Mental Status: She is alert and oriented to person, place, and time.   Psychiatric:         Mood and Affect: Mood normal.         Behavior: Behavior normal.           Significant Labs: All pertinent labs within the past 24 hours have been reviewed.    Significant Imaging: I have reviewed all pertinent imaging results/findings within the past 24 hours.    Assessment/Plan:     * Complete heart block  Patient presented at cardiology's recommendation for bradycardia. She is asymptomatic- no weakness, dizziness, loss of consciousness   Appreciate EP recs:   HD stable. CHB noted 2 weeks prior. Seen in cardiology clinic today and sent to [facility] for PM implantation. TTE with normal EF. Patient NPO except water this AM. Not on OAC. No history of breast cancer or chest wall radiation. No h/o AVFs.  - Left sided dual chamber pacemaker this afternoon  - NO HEPARIN PRODUCTS FOR 5 DAYS POST-PACEMAKER  - Plan for doxycyline  100 mg PO BID x 5 days post-procedure  - EP to follow up in the AM      Hypertension  Continue norvasc 10 and losartan 25      Diabetes mellitus without complication  Lab Results   Component Value Date    HGBA1C 7.1 (H) 12/07/2021     Diabetic diet when eating  Low dose SSI  BG checks ACHS      VTE Risk Mitigation (From admission, onward)         Ordered     IP VTE HIGH RISK PATIENT  Once         06/02/22 1525     Place sequential compression device  Until discontinued         06/02/22 1524                   Jessica Chu PA-C  Department of Hospital Medicine   [facility] - Cardiology

## 2022-06-02 NOTE — TRANSFER OF CARE
"Anesthesia Transfer of Care Note    Patient: Morelia Holder    Procedure(s) Performed: Procedure(s) (LRB):  INSERTION, CARDIAC PACEMAKER, DUAL CHAMBER (Bilateral)    Patient location: PACU    Transport from OR: Transported from OR on room air with adequate spontaneous ventilation    Post pain: adequate analgesia    Post assessment: no apparent anesthetic complications and tolerated procedure well    Post vital signs: stable    Level of consciousness: awake, alert and oriented    Nausea/Vomiting: no nausea/vomiting    Complications: none    Transfer of care protocol was followed      Last vitals:   Visit Vitals  BP (!) 171/84   Pulse (!) 44   Temp 37 °C (98.6 °F) (Oral)   Resp 18   Ht 5' 6" (1.676 m)   Wt 104.8 kg (231 lb)   SpO2 95%   BMI 37.28 kg/m²     "

## 2022-06-02 NOTE — ASSESSMENT & PLAN NOTE
Lab Results   Component Value Date    HGBA1C 7.1 (H) 12/07/2021     Diabetic diet when eating  Low dose SSI  BG checks ACHS

## 2022-06-02 NOTE — ASSESSMENT & PLAN NOTE
HD stable. CHB noted 2 weeks prior. Seen in cardiology clinic today and sent to [facility] for PM implantation. TTE with normal EF. Patient NPO except water this AM. Not on OAC. No history of breast cancer or chest wall radiation. No h/o AVFs.  - Left sided dual chamber pacemaker this afternoon  - NO HEPARIN PRODUCTS FOR 5 DAYS POST-PACEMAKER  - Plan for doxycyline 100 mg PO BID x 5 days post-procedure  - EP to follow up in the AM

## 2022-06-02 NOTE — SUBJECTIVE & OBJECTIVE
Past Medical History:   Diagnosis Date    Asthma     Diabetes mellitus     GERD (gastroesophageal reflux disease)     Insomnia     Muscle spasm     Restless leg        Past Surgical History:   Procedure Laterality Date    BLADDER SUSPENSION      CHOLECYSTECTOMY      COLONOSCOPY      COLONOSCOPY N/A 5/16/2022    Procedure: COLONOSCOPY;  Surgeon: Mercedes Lees MD;  Location: [facility];  Service: Endoscopy;  Laterality: N/A;    incisional hernia repair      UPPER GASTROINTESTINAL ENDOSCOPY         Review of patient's allergies indicates:   Allergen Reactions    Sulfa (sulfonamide antibiotics) Nausea Only    Statins-hmg-coa reductase inhibitors     Sulfabenzamide        No current facility-administered medications on file prior to encounter.     Current Outpatient Medications on File Prior to Encounter   Medication Sig    amLODIPine (NORVASC) 10 MG tablet Take 10 mg by mouth once daily.    blood sugar diagnostic (CONTOUR NEXT TEST STRIPS MIS) Contour Next Test Strips    colestipol (COLESTID) 1 gram Tab     DULoxetine (CYMBALTA) 20 MG capsule Take 20 mg by mouth once daily.    insulin glargine 100 units/mL (3mL) SubQ pen Inject 20 Units into the skin every evening.    lancets (MICROLET LANCET MISC) Microlet Lancet   TEST TWICE A DAY    losartan (COZAAR) 25 MG tablet Take 25 mg by mouth once daily.    meloxicam (MOBIC) 7.5 MG tablet Take 7.5 mg by mouth once daily.    metFORMIN (GLUCOPHAGE) 500 MG tablet Take 1,000 mg by mouth 2 (two) times daily with meals.     multivit-calc-iron-FA-K-hb#244 -80 mg-mcg-mcg Tab [employer]   Take one tablet by mouth every day.    vitamin B complex 100  2-herbs 100 mg Tab vitamin B complex   Take one tablet by mouth every day.    zolpidem (AMBIEN) 10 mg Tab Take 10 mg by mouth nightly as needed.     Family History    None       Tobacco Use    Smoking status: Never Smoker    Smokeless tobacco: Never Used   Substance and Sexual Activity    Alcohol use: No    Drug use:  Not Currently    Sexual activity: Not Currently     Review of Systems   Constitutional:  Negative for diaphoresis, fatigue and fever.   Respiratory:  Negative for cough and shortness of breath.    Cardiovascular:  Negative for chest pain, palpitations and leg swelling.   Gastrointestinal:  Negative for nausea and vomiting.   Genitourinary:  Negative for difficulty urinating and dysuria.   Musculoskeletal:  Negative for arthralgias and gait problem.   Skin:  Negative for color change and pallor.   Neurological:  Negative for dizziness, syncope, light-headedness and headaches.   Psychiatric/Behavioral:  Negative for agitation and confusion. The patient is not nervous/anxious.    Objective:     Vital Signs (Most Recent):  Temp: 98.6 °F (37 °C) (06/02/22 1257)  Pulse: (!) 44 (06/02/22 1457)  Resp: 18 (06/02/22 1341)  BP: (!) 171/84 (06/02/22 1457)  SpO2: 95 % (06/02/22 1457)   Vital Signs (24h Range):  Temp:  [98.6 °F (37 °C)] 98.6 °F (37 °C)  Pulse:  [44-48] 44  Resp:  [18] 18  SpO2:  [95 %-98 %] 95 %  BP: (137-177)/(66-84) 171/84     Weight: 104.8 kg (231 lb)  Body mass index is 37.28 kg/m².    Physical Exam  Constitutional:       General: She is not in acute distress.     Appearance: Normal appearance. She is not ill-appearing.   HENT:      Head: Normocephalic and atraumatic.   Eyes:      Extraocular Movements: Extraocular movements intact.   Cardiovascular:      Rate and Rhythm: Regular rhythm. Bradycardia present.      Pulses: Normal pulses.   Pulmonary:      Effort: Pulmonary effort is normal. No respiratory distress.      Breath sounds: No wheezing.   Abdominal:      General: Abdomen is flat. There is no distension.      Palpations: Abdomen is soft.   Musculoskeletal:      Right lower leg: No edema.      Left lower leg: No edema.   Skin:     General: Skin is warm and dry.   Neurological:      General: No focal deficit present.      Mental Status: She is alert and oriented to person, place, and time.    Psychiatric:         Mood and Affect: Mood normal.         Behavior: Behavior normal.           Significant Labs: All pertinent labs within the past 24 hours have been reviewed.    Significant Imaging: I have reviewed all pertinent imaging results/findings within the past 24 hours.

## 2022-06-02 NOTE — SUBJECTIVE & OBJECTIVE
Past Medical History:   Diagnosis Date    Asthma     Diabetes mellitus     GERD (gastroesophageal reflux disease)     Insomnia     Muscle spasm     Restless leg        Past Surgical History:   Procedure Laterality Date    BLADDER SUSPENSION      CHOLECYSTECTOMY      COLONOSCOPY      COLONOSCOPY N/A 5/16/2022    Procedure: COLONOSCOPY;  Surgeon: Mercedes Lees MD;  Location: [facility];  Service: Endoscopy;  Laterality: N/A;    incisional hernia repair      UPPER GASTROINTESTINAL ENDOSCOPY         Review of patient's allergies indicates:   Allergen Reactions    Sulfa (sulfonamide antibiotics) Nausea Only    Statins-hmg-coa reductase inhibitors     Sulfabenzamide        No current facility-administered medications on file prior to encounter.     Current Outpatient Medications on File Prior to Encounter   Medication Sig    amLODIPine (NORVASC) 10 MG tablet Take 10 mg by mouth once daily.    blood sugar diagnostic (CONTOUR NEXT TEST STRIPS MIS) Contour Next Test Strips    colestipol (COLESTID) 1 gram Tab     DULoxetine (CYMBALTA) 20 MG capsule Take 20 mg by mouth once daily.    insulin glargine 100 units/mL (3mL) SubQ pen Inject 20 Units into the skin every evening.    lancets (MICROLET LANCET MISC) Microlet Lancet   TEST TWICE A DAY    losartan (COZAAR) 25 MG tablet Take 25 mg by mouth once daily.    meloxicam (MOBIC) 7.5 MG tablet Take 7.5 mg by mouth once daily.    metFORMIN (GLUCOPHAGE) 500 MG tablet Take 1,000 mg by mouth 2 (two) times daily with meals.     multivit-calc-iron-FA-K-hb#244 -80 mg-mcg-mcg Tab [employer]   Take one tablet by mouth every day.    vitamin B complex 100  2-herbs 100 mg Tab vitamin B complex   Take one tablet by mouth every day.    zolpidem (AMBIEN) 10 mg Tab Take 10 mg by mouth nightly as needed.     Family History    None       Tobacco Use    Smoking status: Never Smoker    Smokeless tobacco: Never Used   Substance and Sexual Activity    Alcohol use: No    Drug use:  Not Currently    Sexual activity: Not Currently     Review of Systems   All other systems reviewed and are negative.  Objective:     Vital Signs (Most Recent):  Temp: 98.6 °F (37 °C) (06/02/22 1257)  Pulse: (!) 44 (06/02/22 1457)  Resp: 18 (06/02/22 1341)  BP: (!) 177/84 (06/02/22 1341)  SpO2: 95 % (06/02/22 1457)   Vital Signs (24h Range):  Temp:  [98.6 °F (37 °C)] 98.6 °F (37 °C)  Pulse:  [44-48] 44  Resp:  [18] 18  SpO2:  [95 %-98 %] 95 %  BP: (137-177)/(66-84) 177/84     Weight: 104.8 kg (231 lb)  Body mass index is 37.28 kg/m².    SpO2: 95 %  O2 Device (Oxygen Therapy): room air    No intake or output data in the 24 hours ending 06/02/22 1508    Lines/Drains/Airways       Peripheral Intravenous Line  Duration                  Peripheral IV - Single Lumen 06/02/22 1357 20 G Right Antecubital <1 day                    Physical Exam  General: NAD. AAO  HENT: EOMI  Neck: supple. No JVD  CV: bradycardic. Normal S1/S2. No gallops, rubs, or murmurs. 2+ radial pulses  Resp: CTAB. No increased work of breathing  Ext: warm. No edema.      Significant Labs: All pertinent lab results from the last 24 hours have been reviewed.    Significant Imaging:  Reviewed

## 2022-06-02 NOTE — ED NOTES
"Pt is AAOx4. RR is even, unlabored, and spontaneous. Skin is warm, dry and intact. Pt reports going for a colonoscopy recently and after they performed an EKG, was told she needed to come to the ED for an evaluation. When I asked if the pt normally has a low heart rate, pt stated she wasn't sure but after talking with the triage nurse, she realized that she has been having symptoms since ~December. She states she has been dizzy and light headed. Denies chest pain, N/V, shortness of breath, headache, cough, fever. Pt states that she "has never had anything like this before." Pt placed on continuous cardiac monitor, BP cuff, and pulse ox. Bed low and locked; side rails up x2; call light within reach. Family member at bedside. Will continue to monitor.  "

## 2022-06-02 NOTE — Clinical Note
10 ml of contrast were injected throughout the case. 0 mL of contrast was the total wasted during the case. 10 mL was the total amount used during the case.

## 2022-06-02 NOTE — PROGRESS NOTES
Subjective:    Patient ID:  Morelia Holder is a 72 y.o. female who presents for evaluation of Abnormal ECG      PCP: Pavel Moreau MD     Referring Provider: Mercedes Lees MD    HPI  Pt is a 73 yo F w/ PMH of DM2, GERD, and Class 2 Obesity w/ BMI 37.4 who presents w/ and abnormal ECG.  She presented for an colonoscopy 5/16/2022 and had an ECG which noted a 3rd degree AVB w/ narrow complex.  Pt was asymptomatic and hemodynamically stable.  She had her procedure and was discharged home w/ cardiology f/u.  She mentions that she has been having dodge x6 months and notes decline in her functional status.  She also has bipedal edema at times, palpitations w/ exertion, and presyncope.  She denies cp, orthopnea, PND, LOC, and claudication.  She notes compliance w/ meds and denies side effects.  She does not monitor her BP at home.  She does not exercise and she notes a decline in her exercise tolerance.       Past Medical History:   Diagnosis Date    Asthma     Diabetes mellitus     GERD (gastroesophageal reflux disease)     Insomnia     Muscle spasm     Restless leg      Past Surgical History:   Procedure Laterality Date    BLADDER SUSPENSION      CHOLECYSTECTOMY      COLONOSCOPY      COLONOSCOPY N/A 5/16/2022    Procedure: COLONOSCOPY;  Surgeon: Mercedes Lees MD;  Location: [facility];  Service: Endoscopy;  Laterality: N/A;    incisional hernia repair      UPPER GASTROINTESTINAL ENDOSCOPY       Social History     Socioeconomic History    Marital status:    Tobacco Use    Smoking status: Never Smoker    Smokeless tobacco: Never Used   Substance and Sexual Activity    Alcohol use: No    Drug use: Not Currently    Sexual activity: Not Currently     Family History   Problem Relation Age of Onset    Breast cancer Neg Hx     Ovarian cancer Neg Hx     Cervical cancer Neg Hx     Endometrial cancer Neg Hx     Vaginal cancer Neg Hx        Review of patient's allergies indicates:    Allergen Reactions    Sulfa (sulfonamide antibiotics) Nausea Only    Statins-hmg-coa reductase inhibitors     Sulfabenzamide        Medication List with Changes/Refills   Current Medications    AMLODIPINE (NORVASC) 10 MG TABLET    Take 10 mg by mouth once daily.    BLOOD SUGAR DIAGNOSTIC (CONTOUR NEXT TEST STRIPS MISC)    Contour Next Test Strips    COLESTIPOL (COLESTID) 1 GRAM TAB        DULOXETINE (CYMBALTA) 20 MG CAPSULE    Take 20 mg by mouth once daily.    INSULIN GLARGINE 100 UNITS/ML (3ML) SUBQ PEN    Inject 20 Units into the skin every evening.    LANCETS (MICROLET LANCET MISC)    Microlet Lancet   TEST TWICE A DAY    LOSARTAN (COZAAR) 25 MG TABLET    Take 25 mg by mouth once daily.    MELOXICAM (MOBIC) 7.5 MG TABLET    Take 7.5 mg by mouth once daily.    METFORMIN (GLUCOPHAGE) 500 MG TABLET    Take 1,000 mg by mouth 2 (two) times daily with meals.     MULTIVIT-CALC-IRON-FA-K-HB#244 -80 MG-MCG-MCG TAB    Alive [employer]   Take one tablet by mouth every day.    VITAMIN B COMPLEX 100  2-HERBS 100 MG TAB    vitamin B complex   Take one tablet by mouth every day.    ZOLPIDEM (AMBIEN) 10 MG TAB    Take 10 mg by mouth nightly as needed.       Review of Systems   Constitutional: Positive for malaise/fatigue. Negative for diaphoresis and fever.   HENT: Negative for congestion and hearing loss.    Eyes: Negative for blurred vision and pain.   Cardiovascular: Positive for dyspnea on exertion and near-syncope. Negative for chest pain, claudication, leg swelling, palpitations and syncope.   Respiratory: Negative for shortness of breath and sleep disturbances due to breathing.    Hematologic/Lymphatic: Negative for bleeding problem. Does not bruise/bleed easily.   Skin: Negative for color change and poor wound healing.   Gastrointestinal: Negative for abdominal pain and nausea.   Genitourinary: Negative for bladder incontinence and flank pain.   Neurological: Negative for focal weakness and  "light-headedness.        Objective:   BP (!) 151/71   Pulse (!) 44   Ht 5' 8" (1.727 m)   Wt 106.2 kg (234 lb 3.2 oz)   SpO2 98%   BMI 35.61 kg/m²    Physical Exam  Constitutional:       Appearance: She is well-developed. She is obese. She is not diaphoretic.   HENT:      Head: Normocephalic and atraumatic.   Eyes:      General: No scleral icterus.     Pupils: Pupils are equal, round, and reactive to light.   Neck:      Vascular: No JVD.   Cardiovascular:      Rate and Rhythm: Regular rhythm. Bradycardia present.      Pulses: Intact distal pulses.      Heart sounds: S1 normal and S2 normal. No murmur heard.    No friction rub. No gallop.   Pulmonary:      Effort: Pulmonary effort is normal. No respiratory distress.      Breath sounds: Normal breath sounds. No wheezing or rales.   Chest:      Chest wall: No tenderness.   Abdominal:      General: Bowel sounds are normal. There is no distension.      Palpations: Abdomen is soft. There is no mass.      Tenderness: There is no abdominal tenderness. There is no rebound.   Musculoskeletal:         General: No tenderness. Normal range of motion.      Cervical back: Normal range of motion and neck supple.   Skin:     General: Skin is warm and dry.      Coloration: Skin is not pale.   Neurological:      Mental Status: She is alert and oriented to person, place, and time.      Coordination: Coordination normal.      Deep Tendon Reflexes: Reflexes normal.   Psychiatric:         Behavior: Behavior normal.         Judgment: Judgment normal.           EC2022- reviewed.  3rd degree AVB, Low voltage, RBBB.  Significant change as compared to prior.      Assessment:       1. Severe obesity (BMI 35.0-39.9) with comorbidity    2. Abnormal EKG    3. Complete AV block    4. Diabetes mellitus without complication    5. Hypothyroidism due to medication    6. Symptomatic bradycardia         Plan:         Complete AV block  Pt symptomatic w/ dodge, presyncope, and decreased " exercise tolerance.  Pt w/ narrow complex QRS and hemodynamically stable.    - repeat ECG  - pt to go to ED for EP eval for possible PPM  - check echo to eval cardiac function while in hospital    Diabetes mellitus without complication  Followed by PCP.      Severe obesity (BMI 35.0-39.9) with comorbidity  Encouraged lifestyle modifications (diet, exercise, and weight loss).      Hypothyroidism  Followed by PCP.      Symptomatic bradycardia  2/2 CHB, plan as above.        Total duration of face to face visit time 30 minutes.  Total time spent counseling greater than fifty percent of total visit time.  Counseling included discussion regarding imaging findings, diagnosis, possibilities, treatment options, risks and benefits.  The patient had many questions regarding the options and long-term effects      Wayne Witt M.D.  Interventional Cardiology

## 2022-06-03 VITALS
BODY MASS INDEX: 37.09 KG/M2 | HEIGHT: 66 IN | HEART RATE: 77 BPM | RESPIRATION RATE: 20 BRPM | DIASTOLIC BLOOD PRESSURE: 74 MMHG | TEMPERATURE: 99 F | WEIGHT: 230.81 LBS | OXYGEN SATURATION: 93 % | SYSTOLIC BLOOD PRESSURE: 160 MMHG

## 2022-06-03 LAB
ESTIMATED AVG GLUCOSE: 148 MG/DL (ref 68–131)
HBA1C MFR BLD: 6.8 % (ref 4–5.6)
POCT GLUCOSE: 126 MG/DL (ref 70–110)
POCT GLUCOSE: 129 MG/DL (ref 70–110)

## 2022-06-03 PROCEDURE — 83036 HEMOGLOBIN GLYCOSYLATED A1C: CPT | Performed by: PHYSICIAN ASSISTANT

## 2022-06-03 PROCEDURE — 1111F DSCHRG MED/CURRENT MED MERGE: CPT | Mod: CPTII,,, | Performed by: PHYSICIAN ASSISTANT

## 2022-06-03 PROCEDURE — 99239 PR HOSPITAL DISCHARGE DAY,>30 MIN: ICD-10-PCS | Mod: ,,, | Performed by: PHYSICIAN ASSISTANT

## 2022-06-03 PROCEDURE — 99239 HOSP IP/OBS DSCHRG MGMT >30: CPT | Mod: ,,, | Performed by: PHYSICIAN ASSISTANT

## 2022-06-03 PROCEDURE — 25000003 PHARM REV CODE 250: Performed by: INTERNAL MEDICINE

## 2022-06-03 PROCEDURE — 36415 COLL VENOUS BLD VENIPUNCTURE: CPT | Performed by: PHYSICIAN ASSISTANT

## 2022-06-03 PROCEDURE — 25000003 PHARM REV CODE 250: Performed by: PHYSICIAN ASSISTANT

## 2022-06-03 PROCEDURE — 1111F PR DISCHARGE MEDS RECONCILED W/ CURRENT OUTPATIENT MED LIST: ICD-10-PCS | Mod: CPTII,,, | Performed by: PHYSICIAN ASSISTANT

## 2022-06-03 RX ORDER — DOXYCYCLINE HYCLATE 100 MG
100 TABLET ORAL EVERY 12 HOURS
Qty: 10 TABLET | Refills: 0 | Status: SHIPPED | OUTPATIENT
Start: 2022-06-03 | End: 2022-06-08

## 2022-06-03 RX ADMIN — LOSARTAN POTASSIUM 25 MG: 25 TABLET, FILM COATED ORAL at 12:06

## 2022-06-03 RX ADMIN — DOXYCYCLINE HYCLATE 100 MG: 100 TABLET, COATED ORAL at 09:06

## 2022-06-03 RX ADMIN — AMLODIPINE BESYLATE 10 MG: 10 TABLET ORAL at 12:06

## 2022-06-03 RX ADMIN — DULOXETINE HYDROCHLORIDE 20 MG: 20 CAPSULE, DELAYED RELEASE ORAL at 09:06

## 2022-06-03 RX ADMIN — ACETAMINOPHEN 1000 MG: 500 TABLET ORAL at 02:06

## 2022-06-03 RX ADMIN — AMLODIPINE BESYLATE 10 MG: 10 TABLET ORAL at 09:06

## 2022-06-03 RX ADMIN — LOSARTAN POTASSIUM 25 MG: 25 TABLET, FILM COATED ORAL at 09:06

## 2022-06-03 RX ADMIN — POLYETHYLENE GLYCOL 3350 17 G: 17 POWDER, FOR SOLUTION ORAL at 09:06

## 2022-06-03 RX ADMIN — ACETAMINOPHEN 1000 MG: 500 TABLET ORAL at 03:06

## 2022-06-03 NOTE — NURSING TRANSFER
Nursing Transfer Note      6/2/2022         Transfer To: [id number]    Transfer via stretcher    Transfer with cardiac monitoring    Transported by transport team    Medicines sent: n/a        Chart send with patient: Yes    Notified: spouse    Patient reassessed at: 6/2/2022 @ [id number]

## 2022-06-03 NOTE — ANESTHESIA POSTPROCEDURE EVALUATION
Anesthesia Post Evaluation    Patient: Morelia Holder    Procedure(s) Performed: Procedure(s) (LRB):  INSERTION, CARDIAC PACEMAKER, DUAL CHAMBER (Bilateral)    Final Anesthesia Type: general      Patient location during evaluation: PACU  Patient participation: Yes- Able to Participate  Level of consciousness: awake and alert  Post-procedure vital signs: reviewed and stable  Pain management: adequate  Airway patency: patent    PONV status at discharge: No PONV  Anesthetic complications: no      Cardiovascular status: blood pressure returned to baseline  Respiratory status: unassisted  Hydration status: euvolemic  Follow-up not needed.          Vitals Value Taken Time   /67 06/03/22 0800   Temp 36.4 °C (97.6 °F) 06/03/22 0800   Pulse 62 06/03/22 0800   Resp 20 06/03/22 0800   SpO2 92 % 06/03/22 0800         No case tracking events are documented in the log.      Pain/Beny Score: Pain Rating Prior to Med Admin: 5 (6/3/2022  3:55 AM)  Pain Rating Post Med Admin: 4 (6/3/2022  4:55 AM)  Beny Score: 10 (6/2/2022  8:10 PM)

## 2022-06-03 NOTE — CARE UPDATE
Patient seen and examined this morning. Pressure dressing removed.     Sling to left arm - wear for 24 hours post-op, then only at night for 6 weeks.  NO HEPARIN PRODUCTS  Doxycycline 100 mg PO BID for 5 days at discharge  No lifting left elbow above shoulder height  No lifting over 5 pounds  No driving for 1 week and for 4 weeks if patient uses left arm to make turns  Patient may shower in 24 hours, do not let beam of shower hit site directly and no scrubbing in area  Follow up in device clinic in 1 week and with follow up visit with Dr. Marie in 4 months.    EP will sign off

## 2022-06-03 NOTE — RESPIRATORY THERAPY
RAPID RESPONSE RESPIRATORY CHART CHECK       Chart check completed, call [phone] for further concerns or assistance.

## 2022-06-03 NOTE — PLAN OF CARE
Problem: Adult Inpatient Plan of Care  Goal: Plan of Care Review  Outcome: Met  Goal: Patient-Specific Goal (Individualized)  Outcome: Met  Goal: Absence of Hospital-Acquired Illness or Injury  Outcome: Met  Goal: Optimal Comfort and Wellbeing  Outcome: Met  Goal: Readiness for Transition of Care  Outcome: Met     Problem: Infection  Goal: Absence of Infection Signs and Symptoms  Outcome: Met     Problem: Diabetes Comorbidity  Goal: Blood Glucose Level Within Targeted Range  Outcome: Met   Pt is A+Ox4. Frequent checks 2 hrs.  Pt ambulating and voiding in the bathroom.  IV access d/c x2. Discharge education and instructions given. Pt verbalized understanding. Meds delivered to bedside.  to transport pt home.

## 2022-06-03 NOTE — PLAN OF CARE
Pt with elevated BP due to not having home meds, BP meds given at 0000, pain controlled with PRN meds, paced rhythm on monitor, left arm sling in place, no falls or injuries noted

## 2022-06-03 NOTE — NURSING
Pt with elevated b/p at 0000 186/78, pt states she did not have her BP today, Alejandra RIVERA notified, new orders received, continue to monitor

## 2022-06-03 NOTE — DISCHARGE SUMMARY
[facility] - Cardiology [facility] Medicine  Discharge Summary      Patient Name: Morelia Holder  MRN: [id number]  Patient Class: IP- Inpatient  Admission Date: 6/2/2022  Hospital Length of Stay: 1 days  Discharge Date and Time: 6/3/2022  4:47 PM  Attending Physician: Montse Yadav MD   Discharging Provider: Mikey To PA-C  Primary Care Provider: Pavel Moreau MD  Hospital Medicine Team: [facility] MED  Mikey To PA-C    HPI:   Morelia Holder is a 72F with history of asthma, diabetes, GERD who presents from her cardiologists appointment for bradycardia. A few weeks ago she had a colonoscopy and was noted to be bradycardic. She has never lost consciousness or felt faint. After her appointment with cardiology she came straight to the ER. She has no chest pain, dizziness, palpitations, weakness. She feels like her normal self.     In ED: EKG shows complete heart block, EP consulted and patient taken for PPM placement       Procedure(s) (LRB):  INSERTION, CARDIAC PACEMAKER, DUAL CHAMBER (Bilateral)      Hospital Course:   72 y.o. who was admitted to hospital medicine for pacemaker insertion 2/2 to complete heart block. EP evaluated and cleared patient for discharge home with follow-up.        Goals of Care Treatment Preferences:  Code Status: Full Code      Consults:   Consults (From admission, onward)        Status Ordering Provider     Inpatient consult to Cardiology  Once        Provider:  (Not yet assigned)    AARTI Joseph          * Complete heart block  Patient presented at cardiology's recommendation for bradycardia. She is asymptomatic- no weakness, dizziness, loss of consciousness   Appreciate EP recs:   HD stable. CHB noted 2 weeks prior. Seen in cardiology clinic today and sent to [facility] for PM implantation. TTE with normal EF. Patient NPO except water this AM. Not on OAC. No history of breast cancer or chest wall radiation. No h/o AVFs.  - Left sided dual  chamber pacemaker this afternoon  - NO HEPARIN PRODUCTS FOR 5 DAYS POST-PACEMAKER  - Plan for doxycyline 100 mg PO BID x 5 days post-procedure  No lifting left elbow above shoulder height  No lifting over 5 pounds  No driving for 1 week and for 4 weeks if patient uses left arm to make turns  Patient may shower in 24 hours, do not let beam of shower hit site directly and no scrubbing in area  Follow up in device clinic in 1 week and with follow up visit with Dr. Marie in 4 months.      Final Active Diagnoses:    Diagnosis Date Noted POA    PRINCIPAL PROBLEM:  Complete heart block [I44.2] 03/26/2019 Yes    Diabetes mellitus without complication [E11.9] 06/02/2022 Yes    Symptomatic bradycardia [R00.1] 06/02/2022 Yes    Hypertension [I10] 06/02/2022 Yes      Problems Resolved During this Admission:       Discharged Condition: good    Disposition: Home or Self Care    Follow Up:    Patient Instructions:      Ambulatory referral/consult to Electrophysiology   Standing Status: Future   Referral Priority: Urgent Referral Type: Consultation   Referral Reason: Specialty Services Required   Requested Specialty: Electrophysiology   Number of Visits Requested: 1     Notify your health care provider if you experience any of the following:  temperature >100.4     Notify your health care provider if you experience any of the following:  redness, tenderness, or signs of infection (pain, swelling, redness, odor or green/yellow discharge around incision site)     Activity as tolerated       Significant Diagnostic Studies: Labs: All labs within the past 24 hours have been reviewed    Pending Diagnostic Studies:     None         Medications:  Reconciled Home Medications:      Medication List      START taking these medications    doxycycline 100 MG tablet  Commonly known as: VIBRA-TABS  Take 1 tablet (100 mg total) by mouth every 12 (twelve) hours. for 5 days        CONTINUE taking these medications    amLODIPine 10 MG  tablet  Commonly known as: NORVASC  Take 10 mg by mouth once daily.     colestipoL 1 gram Tab  Commonly known as: COLESTID     CONTOUR NEXT TEST STRIPS MISC  Contour Next Test Strips     DULoxetine 20 MG capsule  Commonly known as: CYMBALTA  Take 20 mg by mouth once daily.     insulin glargine 100 units/mL (3mL) SubQ pen  Inject 20 Units into the skin every evening.     losartan 25 MG tablet  Commonly known as: COZAAR  Take 25 mg by mouth once daily.     meloxicam 7.5 MG tablet  Commonly known as: MOBIC  Take 7.5 mg by mouth once daily.     metFORMIN 500 MG tablet  Commonly known as: GLUCOPHAGE  Take 1,000 mg by mouth 2 (two) times daily with meals.     MICROLET LANCET MISC  Microlet Lancet   TEST TWICE A DAY     multivit-calc-iron-FA-K-hb#244 -80 mg-mcg-mcg Tab  [employer]   Take one tablet by mouth every day.     vitamin B complex 100  2-herbs 100 mg Tab  vitamin B complex   Take one tablet by mouth every day.     zolpidem 10 mg Tab  Commonly known as: AMBIEN  Take 10 mg by mouth nightly as needed.            Indwelling Lines/Drains at time of discharge:   Lines/Drains/Airways     Drain  Duration           Female External Urinary Catheter 06/02/22 1900 <1 day                Time spent on the discharge of patient: >30 minutes         Mikey To PA-C  Department of Hospital Medicine  [facility] - Cardiology Stepdown

## 2022-06-03 NOTE — NURSING
Report received from PACU RNMiquel, pt brought to room via stretcher, pt placed on telemetry and in bed, pt care assumed, pt assessed and VS obtained, HOB elevated 30 degrees, splint to left arm in place, no bleeding noted, +CMS left arm, +2 radial pulse, pt oriented to room, call light and bed,  at bedside with pt, continue to monitor

## 2022-06-05 NOTE — ASSESSMENT & PLAN NOTE
Patient presented at cardiology's recommendation for bradycardia. She is asymptomatic- no weakness, dizziness, loss of consciousness   Appreciate EP recs:   HD stable. CHB noted 2 weeks prior. Seen in cardiology clinic today and sent to [facility] for PM implantation. TTE with normal EF. Patient NPO except water this AM. Not on OAC. No history of breast cancer or chest wall radiation. No h/o AVFs.  - Left sided dual chamber pacemaker this afternoon  - NO HEPARIN PRODUCTS FOR 5 DAYS POST-PACEMAKER  - Plan for doxycyline 100 mg PO BID x 5 days post-procedure  No lifting left elbow above shoulder height  No lifting over 5 pounds  No driving for 1 week and for 4 weeks if patient uses left arm to make turns  Patient may shower in 24 hours, do not let beam of shower hit site directly and no scrubbing in area  Follow up in device clinic in 1 week and with follow up visit with Dr. Marie in 4 months.

## 2022-06-05 NOTE — HOSPITAL COURSE
72 y.o. who was admitted to hospital medicine for pacemaker insertion 2/2 to complete heart block. EP evaluated and cleared patient for discharge home with follow-up.

## 2022-06-06 ENCOUNTER — TELEPHONE (OUTPATIENT)
Dept: CARDIOLOGY | Facility: HOSPITAL | Age: 72
End: 2022-06-06
Payer: MEDICARE

## 2022-06-06 DIAGNOSIS — I49.8 OTHER SPECIFIED CARDIAC ARRHYTHMIAS: Primary | ICD-10-CM

## 2022-06-06 NOTE — TELEPHONE ENCOUNTER
I called patient back re: wound check appt. She agreed to 6/9 at 10:40 am for wound check and device check.      ----- Message from Sree Plaza MA sent at 6/6/2022 10:43 AM CDT -----  Good morning, I just spoke with this pt to schedule 4 month follow up post STJ/PPM insert. She stated she had not been called to schedule 1 week follow up. Can someone give pt a call please?

## 2022-06-08 ENCOUNTER — TELEPHONE (OUTPATIENT)
Dept: ELECTROPHYSIOLOGY | Facility: CLINIC | Age: 72
End: 2022-06-08
Payer: MEDICARE

## 2022-06-08 NOTE — TELEPHONE ENCOUNTER
Called patient to follow up after her procedure.  No answer, left message. Called to inquire if she is having any symptoms or complications post PM DUAL IMPLANTATION, any questions.  Call back number provided

## 2022-06-09 ENCOUNTER — CLINICAL SUPPORT (OUTPATIENT)
Dept: CARDIOLOGY | Facility: HOSPITAL | Age: 72
End: 2022-06-09
Attending: STUDENT IN AN ORGANIZED HEALTH CARE EDUCATION/TRAINING PROGRAM
Payer: MEDICARE

## 2022-06-09 DIAGNOSIS — I49.8 OTHER SPECIFIED CARDIAC ARRHYTHMIAS: ICD-10-CM

## 2022-06-09 PROCEDURE — 93280 PM DEVICE PROGR EVAL DUAL: CPT | Mod: 26,,, | Performed by: STUDENT IN AN ORGANIZED HEALTH CARE EDUCATION/TRAINING PROGRAM

## 2022-06-09 PROCEDURE — 93280 CARDIAC DEVICE CHECK - IN CLINIC & HOSPITAL: ICD-10-PCS | Mod: 26,,, | Performed by: STUDENT IN AN ORGANIZED HEALTH CARE EDUCATION/TRAINING PROGRAM

## 2022-06-09 PROCEDURE — 93280 PM DEVICE PROGR EVAL DUAL: CPT

## 2022-06-17 ENCOUNTER — TELEPHONE (OUTPATIENT)
Dept: ELECTROPHYSIOLOGY | Facility: CLINIC | Age: 72
End: 2022-06-17
Payer: MEDICARE

## 2022-06-17 NOTE — TELEPHONE ENCOUNTER
New onset atrial fibrillation noted during device remote check:    Overall burden:  3.8%    Max duration seen: 2 hours 8 min on 6/16/22    Ventricular rates:   Controlled       Anticoagulation status:  NONE    Patient symptoms:  States some fatigue, but otherwise asymptomatic.  Back to usual activities without limitations.    Presenting egram at transmission 4am today APVP

## 2022-06-20 NOTE — TELEPHONE ENCOUNTER
Spoke with patient, advised will monitor AF on home monitor for now.  If increase in AF episodes, will address OAC then.  Patient verbalized understanding.

## 2022-06-24 NOTE — TELEPHONE ENCOUNTER
Since last communication with Dr. Marie, pt still is having PAF burden 4.8%,  Max 2 hrs and 41 mins.      Will inform Dr. Marie

## 2022-08-31 ENCOUNTER — CLINICAL SUPPORT (OUTPATIENT)
Dept: CARDIOLOGY | Facility: HOSPITAL | Age: 72
End: 2022-08-31
Payer: MEDICARE

## 2022-08-31 DIAGNOSIS — I44.2 ATRIOVENTRICULAR BLOCK, COMPLETE: ICD-10-CM

## 2022-08-31 DIAGNOSIS — I48.91 UNSPECIFIED ATRIAL FIBRILLATION: ICD-10-CM

## 2022-08-31 DIAGNOSIS — Z95.0 PRESENCE OF CARDIAC PACEMAKER: ICD-10-CM

## 2022-08-31 PROCEDURE — 93294 REM INTERROG EVL PM/LDLS PM: CPT | Mod: ,,, | Performed by: STUDENT IN AN ORGANIZED HEALTH CARE EDUCATION/TRAINING PROGRAM

## 2022-08-31 PROCEDURE — 93294 CARDIAC DEVICE CHECK - REMOTE: ICD-10-PCS | Mod: ,,, | Performed by: STUDENT IN AN ORGANIZED HEALTH CARE EDUCATION/TRAINING PROGRAM

## 2022-08-31 PROCEDURE — 93296 REM INTERROG EVL PM/IDS: CPT | Performed by: STUDENT IN AN ORGANIZED HEALTH CARE EDUCATION/TRAINING PROGRAM

## 2022-10-17 ENCOUNTER — OFFICE VISIT (OUTPATIENT)
Dept: ELECTROPHYSIOLOGY | Facility: CLINIC | Age: 72
End: 2022-10-17
Payer: MEDICARE

## 2022-10-17 ENCOUNTER — CLINICAL SUPPORT (OUTPATIENT)
Dept: CARDIOLOGY | Facility: HOSPITAL | Age: 72
End: 2022-10-17
Attending: STUDENT IN AN ORGANIZED HEALTH CARE EDUCATION/TRAINING PROGRAM
Payer: MEDICARE

## 2022-10-17 ENCOUNTER — HOSPITAL ENCOUNTER (OUTPATIENT)
Dept: CARDIOLOGY | Facility: CLINIC | Age: 72
Discharge: HOME | End: 2022-10-17
Payer: MEDICARE

## 2022-10-17 VITALS
BODY MASS INDEX: 38.44 KG/M2 | HEIGHT: 66 IN | HEART RATE: 74 BPM | SYSTOLIC BLOOD PRESSURE: 167 MMHG | DIASTOLIC BLOOD PRESSURE: 78 MMHG | WEIGHT: 239.19 LBS

## 2022-10-17 DIAGNOSIS — I10 PRIMARY HYPERTENSION: ICD-10-CM

## 2022-10-17 DIAGNOSIS — I49.8 OTHER SPECIFIED CARDIAC ARRHYTHMIAS: ICD-10-CM

## 2022-10-17 DIAGNOSIS — Z95.0 PACEMAKER: ICD-10-CM

## 2022-10-17 DIAGNOSIS — R00.1 SYMPTOMATIC BRADYCARDIA: ICD-10-CM

## 2022-10-17 DIAGNOSIS — G25.81 RLS (RESTLESS LEGS SYNDROME): ICD-10-CM

## 2022-10-17 DIAGNOSIS — K21.9 GASTROESOPHAGEAL REFLUX DISEASE, UNSPECIFIED WHETHER ESOPHAGITIS PRESENT: ICD-10-CM

## 2022-10-17 DIAGNOSIS — E11.9 TYPE 2 DIABETES MELLITUS WITHOUT COMPLICATION, WITHOUT LONG-TERM CURRENT USE OF INSULIN: ICD-10-CM

## 2022-10-17 DIAGNOSIS — E03.9 HYPOTHYROIDISM, UNSPECIFIED TYPE: ICD-10-CM

## 2022-10-17 DIAGNOSIS — J45.30 MILD PERSISTENT ASTHMA WITHOUT COMPLICATION: ICD-10-CM

## 2022-10-17 DIAGNOSIS — E66.9 CLASS 2 OBESITY WITH BODY MASS INDEX (BMI) OF 38.0 TO 38.9 IN ADULT, UNSPECIFIED OBESITY TYPE, UNSPECIFIED WHETHER SERIOUS COMORBIDITY PRESENT: ICD-10-CM

## 2022-10-17 DIAGNOSIS — I44.2 COMPLETE HEART BLOCK: Primary | ICD-10-CM

## 2022-10-17 PROCEDURE — 1126F PR PAIN SEVERITY QUANTIFIED, NO PAIN PRESENT: ICD-10-PCS | Mod: CPTII,S$GLB,, | Performed by: STUDENT IN AN ORGANIZED HEALTH CARE EDUCATION/TRAINING PROGRAM

## 2022-10-17 PROCEDURE — 3288F PR FALLS RISK ASSESSMENT DOCUMENTED: ICD-10-PCS | Mod: CPTII,S$GLB,, | Performed by: STUDENT IN AN ORGANIZED HEALTH CARE EDUCATION/TRAINING PROGRAM

## 2022-10-17 PROCEDURE — 93280 PM DEVICE PROGR EVAL DUAL: CPT

## 2022-10-17 PROCEDURE — 93280 CARDIAC DEVICE CHECK - IN CLINIC & HOSPITAL: ICD-10-PCS | Mod: 26,,, | Performed by: STUDENT IN AN ORGANIZED HEALTH CARE EDUCATION/TRAINING PROGRAM

## 2022-10-17 PROCEDURE — 93280 PM DEVICE PROGR EVAL DUAL: CPT | Mod: 26,,, | Performed by: STUDENT IN AN ORGANIZED HEALTH CARE EDUCATION/TRAINING PROGRAM

## 2022-10-17 PROCEDURE — 3288F FALL RISK ASSESSMENT DOCD: CPT | Mod: CPTII,S$GLB,, | Performed by: STUDENT IN AN ORGANIZED HEALTH CARE EDUCATION/TRAINING PROGRAM

## 2022-10-17 PROCEDURE — 93005 RHYTHM STRIP: ICD-10-PCS | Mod: S$GLB,,, | Performed by: STUDENT IN AN ORGANIZED HEALTH CARE EDUCATION/TRAINING PROGRAM

## 2022-10-17 PROCEDURE — 93010 ELECTROCARDIOGRAM REPORT: CPT | Mod: S$GLB,,, | Performed by: INTERNAL MEDICINE

## 2022-10-17 PROCEDURE — 99214 OFFICE O/P EST MOD 30 MIN: CPT | Mod: S$GLB,,, | Performed by: STUDENT IN AN ORGANIZED HEALTH CARE EDUCATION/TRAINING PROGRAM

## 2022-10-17 PROCEDURE — 3044F HG A1C LEVEL LT 7.0%: CPT | Mod: CPTII,S$GLB,, | Performed by: STUDENT IN AN ORGANIZED HEALTH CARE EDUCATION/TRAINING PROGRAM

## 2022-10-17 PROCEDURE — 3077F SYST BP >= 140 MM HG: CPT | Mod: CPTII,S$GLB,, | Performed by: STUDENT IN AN ORGANIZED HEALTH CARE EDUCATION/TRAINING PROGRAM

## 2022-10-17 PROCEDURE — 99999 PR PBB SHADOW E&M-EST. PATIENT-LVL III: ICD-10-PCS | Mod: PBBFAC,,, | Performed by: STUDENT IN AN ORGANIZED HEALTH CARE EDUCATION/TRAINING PROGRAM

## 2022-10-17 PROCEDURE — 4010F ACE/ARB THERAPY RXD/TAKEN: CPT | Mod: CPTII,S$GLB,, | Performed by: STUDENT IN AN ORGANIZED HEALTH CARE EDUCATION/TRAINING PROGRAM

## 2022-10-17 PROCEDURE — 1126F AMNT PAIN NOTED NONE PRSNT: CPT | Mod: CPTII,S$GLB,, | Performed by: STUDENT IN AN ORGANIZED HEALTH CARE EDUCATION/TRAINING PROGRAM

## 2022-10-17 PROCEDURE — 3078F PR MOST RECENT DIASTOLIC BLOOD PRESSURE < 80 MM HG: ICD-10-PCS | Mod: CPTII,S$GLB,, | Performed by: STUDENT IN AN ORGANIZED HEALTH CARE EDUCATION/TRAINING PROGRAM

## 2022-10-17 PROCEDURE — 93010 RHYTHM STRIP: ICD-10-PCS | Mod: S$GLB,,, | Performed by: INTERNAL MEDICINE

## 2022-10-17 PROCEDURE — 93005 ELECTROCARDIOGRAM TRACING: CPT | Mod: S$GLB,,, | Performed by: STUDENT IN AN ORGANIZED HEALTH CARE EDUCATION/TRAINING PROGRAM

## 2022-10-17 PROCEDURE — 3044F PR MOST RECENT HEMOGLOBIN A1C LEVEL <7.0%: ICD-10-PCS | Mod: CPTII,S$GLB,, | Performed by: STUDENT IN AN ORGANIZED HEALTH CARE EDUCATION/TRAINING PROGRAM

## 2022-10-17 PROCEDURE — 99214 PR OFFICE/OUTPT VISIT, EST, LEVL IV, 30-39 MIN: ICD-10-PCS | Mod: S$GLB,,, | Performed by: STUDENT IN AN ORGANIZED HEALTH CARE EDUCATION/TRAINING PROGRAM

## 2022-10-17 PROCEDURE — 3078F DIAST BP <80 MM HG: CPT | Mod: CPTII,S$GLB,, | Performed by: STUDENT IN AN ORGANIZED HEALTH CARE EDUCATION/TRAINING PROGRAM

## 2022-10-17 PROCEDURE — 4010F PR ACE/ARB THEARPY RXD/TAKEN: ICD-10-PCS | Mod: CPTII,S$GLB,, | Performed by: STUDENT IN AN ORGANIZED HEALTH CARE EDUCATION/TRAINING PROGRAM

## 2022-10-17 PROCEDURE — 1159F PR MEDICATION LIST DOCUMENTED IN MEDICAL RECORD: ICD-10-PCS | Mod: CPTII,S$GLB,, | Performed by: STUDENT IN AN ORGANIZED HEALTH CARE EDUCATION/TRAINING PROGRAM

## 2022-10-17 PROCEDURE — 1159F MED LIST DOCD IN RCRD: CPT | Mod: CPTII,S$GLB,, | Performed by: STUDENT IN AN ORGANIZED HEALTH CARE EDUCATION/TRAINING PROGRAM

## 2022-10-17 PROCEDURE — 99999 PR PBB SHADOW E&M-EST. PATIENT-LVL III: CPT | Mod: PBBFAC,,, | Performed by: STUDENT IN AN ORGANIZED HEALTH CARE EDUCATION/TRAINING PROGRAM

## 2022-10-17 PROCEDURE — 1101F PR PT FALLS ASSESS DOC 0-1 FALLS W/OUT INJ PAST YR: ICD-10-PCS | Mod: CPTII,S$GLB,, | Performed by: STUDENT IN AN ORGANIZED HEALTH CARE EDUCATION/TRAINING PROGRAM

## 2022-10-17 PROCEDURE — 3077F PR MOST RECENT SYSTOLIC BLOOD PRESSURE >= 140 MM HG: ICD-10-PCS | Mod: CPTII,S$GLB,, | Performed by: STUDENT IN AN ORGANIZED HEALTH CARE EDUCATION/TRAINING PROGRAM

## 2022-10-17 PROCEDURE — 1101F PT FALLS ASSESS-DOCD LE1/YR: CPT | Mod: CPTII,S$GLB,, | Performed by: STUDENT IN AN ORGANIZED HEALTH CARE EDUCATION/TRAINING PROGRAM

## 2022-10-17 NOTE — PROGRESS NOTES
Electrophysiology Clinic Note    Reason for follow-up patient visit: Ongoing evaluation and routine device surveillance of a dual-chamber pacemaker, implanted 6/2/2022 in the setting of complete heart block.    PRESENTING HISTORY:     History of Present Illness:  Ms. Morelia Holder is a araceli 72-year-old woman who returns to clinic today for ongoing evaluation and routine device surveillance of a dual-chamber pacemaker, implanted 6/2/2022 in the setting of complete heart block. She has a past medical history significant for periods of complete heart block, s/p implantation of a dual-chamber pacemaker on 6/2/2022, hypertension, hyperlipidemia, type II diabetes mellitus, mild persistent asthma, GERD, restless leg syndrome, and obesity.     In brief review of her EP history, she is followed in general cardiology clinic with Dr. Witt and was seen following a surveillance colonoscopy that revealed a period of complete heart block. In clinic, she reported symptoms of generalized fatigue, worsened shortness of breath, and exercise intolerance. Her systolic function was preserved on echocardiogram, with her baseline ECG revealing complete heart block with incomplete RBBB. She underwent successful device implantation on 6/2/2022.     In discussion with Ms. Holder today, she tells me that she is feeling overall quite well. Since undergoing device implantation, she denies any recurrent symptoms of exercise intolerance, and reports that her shortness of breath has improved somewhat. She denies any episodes of dizziness, lightheadedness, syncope/presyncope, palpitations, chest pain or chest discomfort, nausea or vomiting, orthopnea, or PND. She reports mild lower extremity edema, and reports that her baseline shortness of breath and dyspnea with exertion has remained. She oftens naps during the day. She can climb more than one flight of stairs prior to needing to take a break, and continues to attempt to increase her  level of physical activity. She recently walked around her neighborhood with her grandchildren for an early trick or treating event without limitation, but reports she was tired following her walking. She reports continued back and leg pain.      Review of Systems:  Review of Systems   Constitutional:  Negative for activity change.   HENT:  Negative for nasal congestion, nosebleeds, postnasal drip, rhinorrhea, sinus pressure/congestion and sneezing.    Respiratory:  Positive for shortness of breath. Negative for apnea, cough, chest tightness and wheezing.    Cardiovascular:  Positive for leg swelling. Negative for chest pain and palpitations.   Gastrointestinal:  Negative for abdominal distention, abdominal pain, blood in stool, change in bowel habit, constipation, diarrhea, nausea, vomiting and change in bowel habit.   Genitourinary:  Negative for dysuria and hematuria.   Musculoskeletal:  Positive for arthralgias and back pain. Negative for gait problem.   Neurological:  Negative for dizziness, seizures, syncope, weakness, light-headedness, headaches, coordination difficulties and coordination difficulties.   Psychiatric/Behavioral:  Positive for sleep disturbance.       PAST HISTORY:     Past Medical History:   Diagnosis Date    Asthma     Diabetes mellitus     GERD (gastroesophageal reflux disease)     Insomnia     Muscle spasm     Restless leg        Past Surgical History:   Procedure Laterality Date    A-V CARDIAC PACEMAKER INSERTION Bilateral 6/2/2022    Procedure: INSERTION, CARDIAC PACEMAKER, DUAL CHAMBER;  Surgeon: ESTEBAN Marie MD;  Location: [facility] EP LAB;  Service: Cardiology;  Laterality: Bilateral;  CHB, DUAL PPM, MDT, ANES, ED29    BLADDER SUSPENSION      CHOLECYSTECTOMY      COLONOSCOPY      COLONOSCOPY N/A 5/16/2022    Procedure: COLONOSCOPY;  Surgeon: Mercedes Lees MD;  Location: [facility] ENDO;  Service: Endoscopy;  Laterality: N/A;    incisional hernia repair      UPPER GASTROINTESTINAL  "ENDOSCOPY         Family History:  Family History   Problem Relation Age of Onset    Breast cancer Neg Hx     Ovarian cancer Neg Hx     Cervical cancer Neg Hx     Endometrial cancer Neg Hx     Vaginal cancer Neg Hx        Social History:  She  reports that she has never smoked. She has never used smokeless tobacco. She reports that she does not currently use drugs. She reports that she does not drink alcohol.      MEDICATIONS & ALLERGIES:     Review of patient's allergies indicates:   Allergen Reactions    Sulfa (sulfonamide antibiotics) Nausea Only    Statins-hmg-coa reductase inhibitors     Sulfabenzamide        Current Outpatient Medications on File Prior to Visit   Medication Sig Dispense Refill    amLODIPine (NORVASC) 10 MG tablet Take 10 mg by mouth once daily.      blood sugar diagnostic (CONTOUR NEXT TEST STRIPS MISC) Contour Next Test Strips      colestipol (COLESTID) 1 gram Tab       DULoxetine (CYMBALTA) 20 MG capsule Take 20 mg by mouth once daily.      insulin glargine 100 units/mL (3mL) SubQ pen Inject 20 Units into the skin every evening.      lancets (MICROLET LANCET MISC) Microlet Lancet   TEST TWICE A DAY      losartan (COZAAR) 25 MG tablet Take 25 mg by mouth once daily.      meloxicam (MOBIC) 7.5 MG tablet Take 7.5 mg by mouth once daily.      metFORMIN (GLUCOPHAGE) 500 MG tablet Take 1,000 mg by mouth 2 (two) times daily with meals.       multivit-calc-iron-FA-K-hb#244 -80 mg-mcg-mcg Tab [employer]   Take one tablet by mouth every day.      vitamin B complex 100  2-herbs 100 mg Tab vitamin B complex   Take one tablet by mouth every day.      zolpidem (AMBIEN) 10 mg Tab Take 10 mg by mouth nightly as needed.       No current facility-administered medications on file prior to visit.        OBJECTIVE:     Vital Signs:  BP (!) 167/78   Pulse 74   Ht 5' 6" (1.676 m)   Wt 108.5 kg (239 lb 3.2 oz)   BMI 38.61 kg/m²     Physical Exam:  Physical Exam  Constitutional:       General: " She is not in acute distress.     Appearance: Normal appearance. She is obese. She is not ill-appearing or diaphoretic.      Comments: Well-appearing woman in NAD.   HENT:      Head: Normocephalic and atraumatic.      Comments: Mask worn in clinic in the setting of COVID precautions.   Eyes:      Pupils: Pupils are equal, round, and reactive to light.   Cardiovascular:      Rate and Rhythm: Normal rate and regular rhythm.      Pulses: Normal pulses.      Heart sounds: Normal heart sounds. No murmur heard.    No friction rub. No gallop.      Comments: Left anterior chest wall incision site is well-healed, with device freely mobile within the pocket.   Pulmonary:      Effort: Pulmonary effort is normal. No respiratory distress.      Breath sounds: Normal breath sounds. No wheezing, rhonchi or rales.   Chest:      Chest wall: No tenderness.   Abdominal:      General: There is no distension.      Palpations: Abdomen is soft.      Tenderness: There is no abdominal tenderness.   Musculoskeletal:         General: No swelling or tenderness.      Cervical back: Normal range of motion.      Right lower leg: No edema.      Left lower leg: No edema.   Skin:     General: Skin is warm and dry.      Findings: No erythema, lesion or rash.   Neurological:      General: No focal deficit present.      Mental Status: She is alert and oriented to person, place, and time. Mental status is at baseline.      Motor: No weakness.      Gait: Gait normal.   Psychiatric:         Mood and Affect: Mood normal.         Behavior: Behavior normal.        Laboratory Data:  Lab Results   Component Value Date    WBC 4.93 06/02/2022    HGB 12.3 06/02/2022    HCT 36.4 (L) 06/02/2022    MCV 91 06/02/2022     06/02/2022     Lab Results   Component Value Date    GLU 85 06/02/2022     06/02/2022    K 4.7 06/02/2022     06/02/2022    CO2 23 06/02/2022    BUN 15 06/02/2022    CREATININE 1.1 06/02/2022    CALCIUM 9.0 06/02/2022    MG 1.6  06/02/2022     Lab Results   Component Value Date    INR 1.0 06/02/2022       Pertinent Cardiac Data:  ECG: Atrially-sensed, ventricularly paced rhythm with periods of atrial pacing, rate of 69 bpm, AV delay 202 ms, paced  ms, QT/QTc 450/482 ms.     Resting 2D Transthoracic Echocardiogram - 6/2/2022:  The left ventricle is mildly enlarged with eccentric hypertrophy and normal systolic function. The estimated ejection fraction is 65%.  Normal right ventricular size with normal right ventricular systolic function.  Normal left ventricular diastolic function.  Mild-to-moderate mitral regurgitation.  The estimated PA systolic pressure is 33 mmHg.  Normal central venous pressure (3 mmHg).    Device Interrogation: Personal review of her device interrogation report ([employer]/eVendor Check, implanted 6/2/2022) reveals adequate battery longevity with an estimated 9.9 - 10.4 years of battery life remaining. Her leads were interrogated with acceptable and stable lead parameters. She is presently AS-, with underlying normal sinus rhythm with junctional escape at 30 bpm. She is RA paced 65%, and RV paced 100%. She has had very rare episodes of atrial fibrillation, with the longest event lasting 10 hours and 27 minutes on 6/26/2022. Her overall AT/AF burden is 1%. There are no concerning AHR or VHR episodes noted. She was reprogrammed to DDDR in an effort to minimize her pacing.       ASSESSMENT & PLAN:   Ms. Morelia Holder is a araceli 72-year-old woman who returns to clinic today for ongoing evaluation and routine device surveillance of a dual-chamber pacemaker, implanted 6/2/2022 in the setting of complete heart block. She has a past medical history significant for periods of complete heart block, s/p implantation of a dual-chamber pacemaker on 6/2/2022, hypertension, hyperlipidemia, type II diabetes mellitus, mild persistent asthma, GERD, restless leg syndrome, and obesity.     - She has a normally  functioning dual-chamber pacemaker on device interrogation today. We discussed continued remote transmissions every three months, with her next in-office transmission in six months.  - She has preserved systolic function, LVEF 65%, although she has an increased RV pacing near 100% pacing burden. Her paced QRSd is wide at 162ms. She reports symptoms of lower extremity edema, but denies any additional signs or symptoms of volume overload. We discussed that she may require device upgrade to a CRT-P with implantation of a coronary sinus lead. In the event her systolic function decreases, or in the event she develops heart failure, we will revisit the topic of device upgrade. We have reprogrammed her device today to attempt to decrease her  burden.   - She has had very rare paroxysms of atrial fibrillation noted immediately following her device placement. Her overall AF burden is 1%, with her longest episode lasting less than 24 hours. She is not presently on oral anticoagulation, but in the event she has an increased burden or any sustained events of atrial fibrillation, we will recommend initiation of oral anticoagulation.      This patient will return to clinic with me in six months, with continued general cardiology and PCP appointments in the interm. All questions and concerns were addressed at this encounter.     Signing Physician:       INDER Marie MD  Electrophysiology Attending

## 2022-10-19 PROBLEM — Z95.0 PACEMAKER: Status: ACTIVE | Noted: 2022-10-19

## 2022-11-29 ENCOUNTER — CLINICAL SUPPORT (OUTPATIENT)
Dept: CARDIOLOGY | Facility: HOSPITAL | Age: 72
End: 2022-11-29
Payer: MEDICARE

## 2022-11-29 DIAGNOSIS — Z95.0 PRESENCE OF CARDIAC PACEMAKER: ICD-10-CM

## 2022-11-29 PROCEDURE — 93296 REM INTERROG EVL PM/IDS: CPT | Performed by: STUDENT IN AN ORGANIZED HEALTH CARE EDUCATION/TRAINING PROGRAM

## 2023-02-27 ENCOUNTER — CLINICAL SUPPORT (OUTPATIENT)
Dept: CARDIOLOGY | Facility: HOSPITAL | Age: 73
End: 2023-02-27
Payer: MEDICARE

## 2023-02-27 DIAGNOSIS — I44.2 ATRIOVENTRICULAR BLOCK, COMPLETE: ICD-10-CM

## 2023-02-27 DIAGNOSIS — I48.91 UNSPECIFIED ATRIAL FIBRILLATION: ICD-10-CM

## 2023-02-27 DIAGNOSIS — Z95.0 PRESENCE OF CARDIAC PACEMAKER: ICD-10-CM

## 2023-02-27 PROCEDURE — 93296 REM INTERROG EVL PM/IDS: CPT | Performed by: STUDENT IN AN ORGANIZED HEALTH CARE EDUCATION/TRAINING PROGRAM

## 2023-02-27 PROCEDURE — 93294 REM INTERROG EVL PM/LDLS PM: CPT | Mod: ,,, | Performed by: STUDENT IN AN ORGANIZED HEALTH CARE EDUCATION/TRAINING PROGRAM

## 2023-02-27 PROCEDURE — 93294 CARDIAC DEVICE CHECK - REMOTE: ICD-10-PCS | Mod: ,,, | Performed by: STUDENT IN AN ORGANIZED HEALTH CARE EDUCATION/TRAINING PROGRAM

## 2023-04-28 ENCOUNTER — TELEPHONE (OUTPATIENT)
Dept: ELECTROPHYSIOLOGY | Facility: CLINIC | Age: 73
End: 2023-04-28
Payer: MEDICARE

## 2023-04-28 NOTE — TELEPHONE ENCOUNTER
Alert received from Merlin HM  Pt in AF since 4/26/23, v rates wnl as pt has hx of CHB    Not on OAC or BBs.    Called and left vm to assess symptoms, meds and if pt reports any recent changes to health.              __________________

## 2023-04-28 NOTE — TELEPHONE ENCOUNTER
Message  Received: Today  Emmy Kelly, RN  Caller: Unspecified (Today, 12:32 PM)  Patient returned your call.  She [place] does often.  She stated she exerted herself as she walked far. She also said she has been having a back ache for the last few months.  I told her if you needed to speak to her that you would reach out to her.  She can be reached at [phone].  Thanks

## 2023-05-15 ENCOUNTER — OFFICE VISIT (OUTPATIENT)
Dept: ELECTROPHYSIOLOGY | Facility: CLINIC | Age: 73
End: 2023-05-15
Attending: STUDENT IN AN ORGANIZED HEALTH CARE EDUCATION/TRAINING PROGRAM
Payer: MEDICARE

## 2023-05-15 ENCOUNTER — CLINICAL SUPPORT (OUTPATIENT)
Dept: CARDIOLOGY | Facility: HOSPITAL | Age: 73
End: 2023-05-15
Attending: STUDENT IN AN ORGANIZED HEALTH CARE EDUCATION/TRAINING PROGRAM
Payer: MEDICARE

## 2023-05-15 ENCOUNTER — HOSPITAL ENCOUNTER (OUTPATIENT)
Dept: CARDIOLOGY | Facility: CLINIC | Age: 73
Discharge: HOME | End: 2023-05-15
Payer: MEDICARE

## 2023-05-15 VITALS
HEART RATE: 60 BPM | DIASTOLIC BLOOD PRESSURE: 69 MMHG | SYSTOLIC BLOOD PRESSURE: 155 MMHG | BODY MASS INDEX: 38.9 KG/M2 | HEIGHT: 66 IN | WEIGHT: 242.06 LBS

## 2023-05-15 DIAGNOSIS — E11.9 TYPE 2 DIABETES MELLITUS WITHOUT COMPLICATION, WITHOUT LONG-TERM CURRENT USE OF INSULIN: ICD-10-CM

## 2023-05-15 DIAGNOSIS — I49.8 OTHER SPECIFIED CARDIAC ARRHYTHMIAS: ICD-10-CM

## 2023-05-15 DIAGNOSIS — J45.30 MILD PERSISTENT ASTHMA WITHOUT COMPLICATION: ICD-10-CM

## 2023-05-15 DIAGNOSIS — I44.2 COMPLETE HEART BLOCK: ICD-10-CM

## 2023-05-15 DIAGNOSIS — I48.0 PAROXYSMAL ATRIAL FIBRILLATION: Primary | ICD-10-CM

## 2023-05-15 DIAGNOSIS — K21.9 GASTROESOPHAGEAL REFLUX DISEASE, UNSPECIFIED WHETHER ESOPHAGITIS PRESENT: ICD-10-CM

## 2023-05-15 DIAGNOSIS — E66.9 CLASS 2 OBESITY WITH BODY MASS INDEX (BMI) OF 39.0 TO 39.9 IN ADULT, UNSPECIFIED OBESITY TYPE, UNSPECIFIED WHETHER SERIOUS COMORBIDITY PRESENT: ICD-10-CM

## 2023-05-15 DIAGNOSIS — G25.81 RLS (RESTLESS LEGS SYNDROME): ICD-10-CM

## 2023-05-15 DIAGNOSIS — I10 PRIMARY HYPERTENSION: ICD-10-CM

## 2023-05-15 DIAGNOSIS — Z95.0 PACEMAKER: ICD-10-CM

## 2023-05-15 DIAGNOSIS — E03.9 HYPOTHYROIDISM, UNSPECIFIED TYPE: ICD-10-CM

## 2023-05-15 DIAGNOSIS — E66.01 SEVERE OBESITY (BMI 35.0-39.9) WITH COMORBIDITY: ICD-10-CM

## 2023-05-15 DIAGNOSIS — R00.1 SYMPTOMATIC BRADYCARDIA: ICD-10-CM

## 2023-05-15 PROCEDURE — 93005 RHYTHM STRIP: ICD-10-PCS | Mod: S$GLB,,, | Performed by: STUDENT IN AN ORGANIZED HEALTH CARE EDUCATION/TRAINING PROGRAM

## 2023-05-15 PROCEDURE — 1160F PR REVIEW ALL MEDS BY PRESCRIBER/CLIN PHARMACIST DOCUMENTED: ICD-10-PCS | Mod: CPTII,,, | Performed by: STUDENT IN AN ORGANIZED HEALTH CARE EDUCATION/TRAINING PROGRAM

## 2023-05-15 PROCEDURE — 3077F PR MOST RECENT SYSTOLIC BLOOD PRESSURE >= 140 MM HG: ICD-10-PCS | Mod: CPTII,,, | Performed by: STUDENT IN AN ORGANIZED HEALTH CARE EDUCATION/TRAINING PROGRAM

## 2023-05-15 PROCEDURE — 1125F PR PAIN SEVERITY QUANTIFIED, PAIN PRESENT: ICD-10-PCS | Mod: CPTII,,, | Performed by: STUDENT IN AN ORGANIZED HEALTH CARE EDUCATION/TRAINING PROGRAM

## 2023-05-15 PROCEDURE — 99999 PR PBB SHADOW E&M-EST. PATIENT-LVL III: CPT | Mod: PBBFAC,,, | Performed by: STUDENT IN AN ORGANIZED HEALTH CARE EDUCATION/TRAINING PROGRAM

## 2023-05-15 PROCEDURE — 93010 RHYTHM STRIP: ICD-10-PCS | Mod: S$GLB,,, | Performed by: INTERNAL MEDICINE

## 2023-05-15 PROCEDURE — 3077F SYST BP >= 140 MM HG: CPT | Mod: CPTII,,, | Performed by: STUDENT IN AN ORGANIZED HEALTH CARE EDUCATION/TRAINING PROGRAM

## 2023-05-15 PROCEDURE — 4010F PR ACE/ARB THEARPY RXD/TAKEN: ICD-10-PCS | Mod: CPTII,,, | Performed by: STUDENT IN AN ORGANIZED HEALTH CARE EDUCATION/TRAINING PROGRAM

## 2023-05-15 PROCEDURE — 1160F RVW MEDS BY RX/DR IN RCRD: CPT | Mod: CPTII,,, | Performed by: STUDENT IN AN ORGANIZED HEALTH CARE EDUCATION/TRAINING PROGRAM

## 2023-05-15 PROCEDURE — 1101F PR PT FALLS ASSESS DOC 0-1 FALLS W/OUT INJ PAST YR: ICD-10-PCS | Mod: CPTII,,, | Performed by: STUDENT IN AN ORGANIZED HEALTH CARE EDUCATION/TRAINING PROGRAM

## 2023-05-15 PROCEDURE — 93010 ELECTROCARDIOGRAM REPORT: CPT | Mod: S$GLB,,, | Performed by: INTERNAL MEDICINE

## 2023-05-15 PROCEDURE — 3078F PR MOST RECENT DIASTOLIC BLOOD PRESSURE < 80 MM HG: ICD-10-PCS | Mod: CPTII,,, | Performed by: STUDENT IN AN ORGANIZED HEALTH CARE EDUCATION/TRAINING PROGRAM

## 2023-05-15 PROCEDURE — 93005 ELECTROCARDIOGRAM TRACING: CPT | Mod: S$GLB,,, | Performed by: STUDENT IN AN ORGANIZED HEALTH CARE EDUCATION/TRAINING PROGRAM

## 2023-05-15 PROCEDURE — 99214 PR OFFICE/OUTPT VISIT, EST, LEVL IV, 30-39 MIN: ICD-10-PCS | Mod: ,,, | Performed by: STUDENT IN AN ORGANIZED HEALTH CARE EDUCATION/TRAINING PROGRAM

## 2023-05-15 PROCEDURE — 3288F FALL RISK ASSESSMENT DOCD: CPT | Mod: CPTII,,, | Performed by: STUDENT IN AN ORGANIZED HEALTH CARE EDUCATION/TRAINING PROGRAM

## 2023-05-15 PROCEDURE — 99214 OFFICE O/P EST MOD 30 MIN: CPT | Mod: ,,, | Performed by: STUDENT IN AN ORGANIZED HEALTH CARE EDUCATION/TRAINING PROGRAM

## 2023-05-15 PROCEDURE — 1159F PR MEDICATION LIST DOCUMENTED IN MEDICAL RECORD: ICD-10-PCS | Mod: CPTII,,, | Performed by: STUDENT IN AN ORGANIZED HEALTH CARE EDUCATION/TRAINING PROGRAM

## 2023-05-15 PROCEDURE — 3078F DIAST BP <80 MM HG: CPT | Mod: CPTII,,, | Performed by: STUDENT IN AN ORGANIZED HEALTH CARE EDUCATION/TRAINING PROGRAM

## 2023-05-15 PROCEDURE — 1101F PT FALLS ASSESS-DOCD LE1/YR: CPT | Mod: CPTII,,, | Performed by: STUDENT IN AN ORGANIZED HEALTH CARE EDUCATION/TRAINING PROGRAM

## 2023-05-15 PROCEDURE — 3288F PR FALLS RISK ASSESSMENT DOCUMENTED: ICD-10-PCS | Mod: CPTII,,, | Performed by: STUDENT IN AN ORGANIZED HEALTH CARE EDUCATION/TRAINING PROGRAM

## 2023-05-15 PROCEDURE — 1125F AMNT PAIN NOTED PAIN PRSNT: CPT | Mod: CPTII,,, | Performed by: STUDENT IN AN ORGANIZED HEALTH CARE EDUCATION/TRAINING PROGRAM

## 2023-05-15 PROCEDURE — 99999 PR PBB SHADOW E&M-EST. PATIENT-LVL III: ICD-10-PCS | Mod: PBBFAC,,, | Performed by: STUDENT IN AN ORGANIZED HEALTH CARE EDUCATION/TRAINING PROGRAM

## 2023-05-15 PROCEDURE — 4010F ACE/ARB THERAPY RXD/TAKEN: CPT | Mod: CPTII,,, | Performed by: STUDENT IN AN ORGANIZED HEALTH CARE EDUCATION/TRAINING PROGRAM

## 2023-05-15 PROCEDURE — 3008F PR BODY MASS INDEX (BMI) DOCUMENTED: ICD-10-PCS | Mod: CPTII,,, | Performed by: STUDENT IN AN ORGANIZED HEALTH CARE EDUCATION/TRAINING PROGRAM

## 2023-05-15 PROCEDURE — 3008F BODY MASS INDEX DOCD: CPT | Mod: CPTII,,, | Performed by: STUDENT IN AN ORGANIZED HEALTH CARE EDUCATION/TRAINING PROGRAM

## 2023-05-15 PROCEDURE — 1159F MED LIST DOCD IN RCRD: CPT | Mod: CPTII,,, | Performed by: STUDENT IN AN ORGANIZED HEALTH CARE EDUCATION/TRAINING PROGRAM

## 2023-05-15 NOTE — PROGRESS NOTES
Electrophysiology Clinic Note    Reason for follow-up patient visit: Ongoing evaluation and routine device surveillance of a dual-chamber pacemaker, implanted 6/2/2022 in the setting of complete heart block.    PRESENTING HISTORY:     History of Present Illness:  Ms. Morelia Holder is a araceli 73-year-old woman who returns to clinic today for ongoing evaluation and routine device surveillance of a dual-chamber pacemaker, implanted 6/2/2022 in the setting of complete heart block. She has a past medical history significant for periods of complete heart block, s/p implantation of a dual-chamber pacemaker on 6/2/2022, hypertension, hyperlipidemia, type II diabetes mellitus, mild persistent asthma, GERD, restless leg syndrome, and obesity.     In brief review of her EP history, she is followed in general cardiology clinic with Dr. Witt and was seen following a surveillance colonoscopy that revealed a period of complete heart block. In clinic, she reported symptoms of generalized fatigue, worsened shortness of breath, and exercise intolerance. Her systolic function was preserved on echocardiogram, with her baseline ECG revealing complete heart block with incomplete RBBB. She underwent successful device implantation on 6/2/2022.     In discussion with Ms. Holder today, she tells me that she is feeling overall quite well. Since undergoing device implantation, she denies any recurrent symptoms of exercise intolerance, and reports that her shortness of breath has improved somewhat. She denies any episodes of dizziness, lightheadedness, syncope/presyncope, palpitations, chest pain or chest discomfort, nausea or vomiting, orthopnea, or PND. She reports mild lower extremity edema, and reports that her baseline shortness of breath and dyspnea with exertion has remained. She oftens naps during the day. She can climb more than one flight of stairs prior to needing to take a break, and continues to attempt to increase her  level of physical activity. She recently walked around her neighborhood with her grandchildren for an early trick or treating event without limitation, but reports she was tired following her walking. She reports continued back and leg pain.      Interval History:  Patient denies chest pain fevers chills SOB. Patient found to have AF on device longested episode near 2 days. Asymptomatic. Reports snoring. Having lots of stressors as her daughter  a few years prior and is raising her teenage grandson      Device interrogation shows normal device function adequate battery life and AF 9.5% burden longest episode 47 hours. Episodes typically starting in evening while sleeping   Todays ECG A paced v paced 60bpm  Review of Systems:  Review of Systems   Constitutional:  Negative for activity change.   HENT:  Negative for nasal congestion, nosebleeds, postnasal drip, rhinorrhea, sinus pressure/congestion and sneezing.    Respiratory:  Positive for shortness of breath. Negative for apnea, cough, chest tightness and wheezing.    Cardiovascular:  Positive for leg swelling. Negative for chest pain and palpitations.   Gastrointestinal:  Negative for abdominal distention, abdominal pain, blood in stool, change in bowel habit, constipation, diarrhea, nausea, vomiting and change in bowel habit.   Genitourinary:  Negative for dysuria and hematuria.   Musculoskeletal:  Positive for arthralgias and back pain. Negative for gait problem.   Neurological:  Negative for dizziness, seizures, syncope, weakness, light-headedness, headaches, coordination difficulties and coordination difficulties.   Psychiatric/Behavioral:  Positive for sleep disturbance.       PAST HISTORY:     Past Medical History:   Diagnosis Date    Asthma     Diabetes mellitus     GERD (gastroesophageal reflux disease)     Insomnia     Muscle spasm     Restless leg        Past Surgical History:   Procedure Laterality Date    A-V CARDIAC PACEMAKER INSERTION Bilateral  6/2/2022    Procedure: INSERTION, CARDIAC PACEMAKER, DUAL CHAMBER;  Surgeon: ESTEBAN Marie MD;  Location: [facility] EP LAB;  Service: Cardiology;  Laterality: Bilateral;  CHB, DUAL PPM, MDT, ANES, ED29    BLADDER SUSPENSION      CHOLECYSTECTOMY      COLONOSCOPY      COLONOSCOPY N/A 5/16/2022    Procedure: COLONOSCOPY;  Surgeon: Mercedes Lees MD;  Location: [facility] ENDO;  Service: Endoscopy;  Laterality: N/A;    incisional hernia repair      UPPER GASTROINTESTINAL ENDOSCOPY         Family History:  Family History   Problem Relation Age of Onset    Breast cancer Neg Hx     Ovarian cancer Neg Hx     Cervical cancer Neg Hx     Endometrial cancer Neg Hx     Vaginal cancer Neg Hx        Social History:  She  reports that she has never smoked. She has never used smokeless tobacco. She reports that she does not currently use drugs. She reports that she does not drink alcohol.      MEDICATIONS & ALLERGIES:     Review of patient's allergies indicates:   Allergen Reactions    Sulfa (sulfonamide antibiotics) Nausea Only    Statins-hmg-coa reductase inhibitors     Sulfabenzamide        Current Outpatient Medications on File Prior to Visit   Medication Sig Dispense Refill    amLODIPine (NORVASC) 10 MG tablet Take 10 mg by mouth once daily.      blood sugar diagnostic (CONTOUR NEXT TEST STRIPS MIS) Contour Next Test Strips      colestipol (COLESTID) 1 gram Tab       DULoxetine (CYMBALTA) 20 MG capsule Take 20 mg by mouth once daily.      insulin glargine 100 units/mL (3mL) SubQ pen Inject 20 Units into the skin every evening.      lancets (MICROLET LANCET MISC) Microlet Lancet   TEST TWICE A DAY      losartan (COZAAR) 25 MG tablet Take 25 mg by mouth once daily.      meloxicam (MOBIC) 7.5 MG tablet Take 7.5 mg by mouth once daily.      metFORMIN (GLUCOPHAGE) 500 MG tablet Take 1,000 mg by mouth 2 (two) times daily with meals.       multivit-calc-iron-FA-K-hb#244 -80 mg-mcg-mcg Tab [employer]   Take one tablet  "by mouth every day.      vitamin B complex 100  2-herbs 100 mg Tab vitamin B complex   Take one tablet by mouth every day.      zolpidem (AMBIEN) 10 mg Tab Take 10 mg by mouth nightly as needed.       No current facility-administered medications on file prior to visit.        OBJECTIVE:     Vital Signs:  BP (!) 155/69   Pulse 60   Ht 5' 6" (1.676 m)   Wt 109.8 kg (242 lb 1 oz)   BMI 39.07 kg/m²     Physical Exam:  Physical Exam  Constitutional:       General: She is not in acute distress.     Appearance: Normal appearance. She is obese. She is not ill-appearing or diaphoretic.      Comments: Well-appearing woman in NAD.   HENT:      Head: Normocephalic and atraumatic.   Eyes:      Pupils: Pupils are equal, round, and reactive to light.   Cardiovascular:      Rate and Rhythm: Normal rate and regular rhythm.      Pulses: Normal pulses.      Heart sounds: Normal heart sounds. No murmur heard.    No friction rub. No gallop.      Comments: Left anterior chest wall incision site is well-healed, with device freely mobile within the pocket.   Pulmonary:      Effort: Pulmonary effort is normal. No respiratory distress.      Breath sounds: Normal breath sounds. No wheezing, rhonchi or rales.   Chest:      Chest wall: No tenderness.   Abdominal:      General: There is no distension.      Palpations: Abdomen is soft.      Tenderness: There is no abdominal tenderness.   Musculoskeletal:         General: No swelling or tenderness.      Cervical back: Normal range of motion.      Right lower leg: No edema.      Left lower leg: No edema.   Skin:     General: Skin is warm and dry.      Findings: No erythema, lesion or rash.   Neurological:      General: No focal deficit present.      Mental Status: She is alert and oriented to person, place, and time. Mental status is at baseline.      Motor: No weakness.      Gait: Gait normal.   Psychiatric:         Mood and Affect: Mood normal.         Behavior: Behavior normal.    "     Laboratory Data:  Lab Results   Component Value Date    WBC 5.60 11/01/2022    HGB 13.1 11/01/2022    HCT 39.7 11/01/2022    MCV 94 11/01/2022     11/01/2022     Lab Results   Component Value Date     (H) 11/01/2022     11/01/2022    K 4.4 11/01/2022     11/01/2022    CO2 24 11/01/2022    BUN 20 (H) 11/01/2022    CREATININE 1.01 11/01/2022    CALCIUM 8.8 11/01/2022    MG 1.6 06/02/2022     Lab Results   Component Value Date    INR 1.0 06/02/2022       Pertinent Cardiac Data:  ECG: Atrially-sensed, ventricularly paced rhythm with periods of atrial pacing, rate of 69 bpm, AV delay 202 ms, paced  ms, QT/QTc 450/482 ms.     Resting 2D Transthoracic Echocardiogram - 6/2/2022:  The left ventricle is mildly enlarged with eccentric hypertrophy and normal systolic function. The estimated ejection fraction is 65%.  Normal right ventricular size with normal right ventricular systolic function.  Normal left ventricular diastolic function.  Mild-to-moderate mitral regurgitation.  The estimated PA systolic pressure is 33 mmHg.  Normal central venous pressure (3 mmHg).    Device Interrogation: Personal review of her device interrogation report ([employer] MRI, implanted 6/2/2022) reveals adequate battery longevity with an estimated 9.9 - 10.4 years of battery life remaining. Her leads were interrogated with acceptable and stable lead parameters. She is presently AS-, with underlying normal sinus rhythm with junctional escape at 30 bpm. She is RA paced 65%, and RV paced 100%. She has had very rare episodes of atrial fibrillation, with the longest event lasting 10 hours and 27 minutes on 6/26/2022. Her overall AT/AF burden is 1%. There are no concerning AHR or VHR episodes noted. She was reprogrammed to DDDR in an effort to minimize her pacing.       ASSESSMENT & PLAN:   Ms. Morelia Holder is a araceli 73-year-old woman who returns to clinic today for ongoing evaluation  and routine device surveillance of a dual-chamber pacemaker, implanted 6/2/2022 in the setting of complete heart block. She has a past medical history significant for periods of complete heart block, s/p implantation of a dual-chamber pacemaker on 6/2/2022, hypertension, hyperlipidemia, type II diabetes mellitus, mild persistent asthma, GERD, restless leg syndrome, and obesity.     - She has a normally functioning dual-chamber pacemaker on device interrogation today. We discussed continued remote transmissions every three months, with her next in-office transmission in six months. Found to have atrial fibrillation 10% burden  -start oral anticoagulation eliquis 5mg BID  Refer to sleep clinic given sleep apnea symptoms       This patient will return to clinic with me in six months, with continued general cardiology and PCP appointments in the interm. All questions and concerns were addressed at this encounter.   Haile Reddy MD

## 2023-05-28 ENCOUNTER — CLINICAL SUPPORT (OUTPATIENT)
Dept: CARDIOLOGY | Facility: HOSPITAL | Age: 73
End: 2023-05-28
Payer: MEDICARE

## 2023-05-28 DIAGNOSIS — Z95.0 PRESENCE OF CARDIAC PACEMAKER: ICD-10-CM

## 2023-05-28 DIAGNOSIS — I44.2 ATRIOVENTRICULAR BLOCK, COMPLETE: ICD-10-CM

## 2023-05-28 DIAGNOSIS — I48.91 UNSPECIFIED ATRIAL FIBRILLATION: ICD-10-CM

## 2023-05-28 PROCEDURE — 93296 REM INTERROG EVL PM/IDS: CPT | Performed by: STUDENT IN AN ORGANIZED HEALTH CARE EDUCATION/TRAINING PROGRAM

## 2023-08-26 ENCOUNTER — CLINICAL SUPPORT (OUTPATIENT)
Dept: CARDIOLOGY | Facility: HOSPITAL | Age: 73
End: 2023-08-26
Payer: MEDICARE

## 2023-08-26 DIAGNOSIS — Z95.0 PRESENCE OF CARDIAC PACEMAKER: ICD-10-CM

## 2023-08-26 PROCEDURE — 93294 CARDIAC DEVICE CHECK - REMOTE: ICD-10-PCS | Mod: ,,, | Performed by: STUDENT IN AN ORGANIZED HEALTH CARE EDUCATION/TRAINING PROGRAM

## 2023-08-26 PROCEDURE — 93294 REM INTERROG EVL PM/LDLS PM: CPT | Mod: ,,, | Performed by: STUDENT IN AN ORGANIZED HEALTH CARE EDUCATION/TRAINING PROGRAM

## 2023-08-26 PROCEDURE — 93296 REM INTERROG EVL PM/IDS: CPT | Performed by: STUDENT IN AN ORGANIZED HEALTH CARE EDUCATION/TRAINING PROGRAM

## 2023-11-13 ENCOUNTER — OFFICE VISIT (OUTPATIENT)
Dept: ELECTROPHYSIOLOGY | Facility: CLINIC | Age: 73
End: 2023-11-13
Payer: MEDICARE

## 2023-11-13 ENCOUNTER — CLINICAL SUPPORT (OUTPATIENT)
Dept: CARDIOLOGY | Facility: HOSPITAL | Age: 73
End: 2023-11-13
Attending: STUDENT IN AN ORGANIZED HEALTH CARE EDUCATION/TRAINING PROGRAM
Payer: MEDICARE

## 2023-11-13 ENCOUNTER — HOSPITAL ENCOUNTER (OUTPATIENT)
Dept: CARDIOLOGY | Facility: CLINIC | Age: 73
Discharge: HOME | End: 2023-11-13
Payer: MEDICARE

## 2023-11-13 VITALS
HEART RATE: 60 BPM | SYSTOLIC BLOOD PRESSURE: 162 MMHG | HEIGHT: 66 IN | BODY MASS INDEX: 38.27 KG/M2 | DIASTOLIC BLOOD PRESSURE: 72 MMHG | WEIGHT: 238.13 LBS

## 2023-11-13 DIAGNOSIS — I48.0 PAROXYSMAL ATRIAL FIBRILLATION: ICD-10-CM

## 2023-11-13 DIAGNOSIS — E66.9 CLASS 2 OBESITY WITH BODY MASS INDEX (BMI) OF 38.0 TO 38.9 IN ADULT, UNSPECIFIED OBESITY TYPE, UNSPECIFIED WHETHER SERIOUS COMORBIDITY PRESENT: ICD-10-CM

## 2023-11-13 DIAGNOSIS — K21.9 GASTROESOPHAGEAL REFLUX DISEASE, UNSPECIFIED WHETHER ESOPHAGITIS PRESENT: ICD-10-CM

## 2023-11-13 DIAGNOSIS — I49.8 OTHER SPECIFIED CARDIAC ARRHYTHMIAS: ICD-10-CM

## 2023-11-13 DIAGNOSIS — E11.9 TYPE 2 DIABETES MELLITUS WITHOUT COMPLICATION, WITHOUT LONG-TERM CURRENT USE OF INSULIN: ICD-10-CM

## 2023-11-13 DIAGNOSIS — I44.2 COMPLETE HEART BLOCK: Primary | ICD-10-CM

## 2023-11-13 DIAGNOSIS — R00.1 SYMPTOMATIC BRADYCARDIA: ICD-10-CM

## 2023-11-13 DIAGNOSIS — Z95.0 PACEMAKER: ICD-10-CM

## 2023-11-13 DIAGNOSIS — I10 PRIMARY HYPERTENSION: ICD-10-CM

## 2023-11-13 DIAGNOSIS — G25.81 RLS (RESTLESS LEGS SYNDROME): ICD-10-CM

## 2023-11-13 DIAGNOSIS — E03.9 HYPOTHYROIDISM, UNSPECIFIED TYPE: ICD-10-CM

## 2023-11-13 DIAGNOSIS — J45.30 MILD PERSISTENT ASTHMA WITHOUT COMPLICATION: ICD-10-CM

## 2023-11-13 PROCEDURE — 3288F PR FALLS RISK ASSESSMENT DOCUMENTED: ICD-10-PCS | Mod: CPTII,S$GLB,, | Performed by: STUDENT IN AN ORGANIZED HEALTH CARE EDUCATION/TRAINING PROGRAM

## 2023-11-13 PROCEDURE — 93280 PM DEVICE PROGR EVAL DUAL: CPT

## 2023-11-13 PROCEDURE — 3078F DIAST BP <80 MM HG: CPT | Mod: CPTII,S$GLB,, | Performed by: STUDENT IN AN ORGANIZED HEALTH CARE EDUCATION/TRAINING PROGRAM

## 2023-11-13 PROCEDURE — 3077F SYST BP >= 140 MM HG: CPT | Mod: CPTII,S$GLB,, | Performed by: STUDENT IN AN ORGANIZED HEALTH CARE EDUCATION/TRAINING PROGRAM

## 2023-11-13 PROCEDURE — 99214 PR OFFICE/OUTPT VISIT, EST, LEVL IV, 30-39 MIN: ICD-10-PCS | Mod: S$GLB,,, | Performed by: STUDENT IN AN ORGANIZED HEALTH CARE EDUCATION/TRAINING PROGRAM

## 2023-11-13 PROCEDURE — 3008F BODY MASS INDEX DOCD: CPT | Mod: CPTII,S$GLB,, | Performed by: STUDENT IN AN ORGANIZED HEALTH CARE EDUCATION/TRAINING PROGRAM

## 2023-11-13 PROCEDURE — 1101F PR PT FALLS ASSESS DOC 0-1 FALLS W/OUT INJ PAST YR: ICD-10-PCS | Mod: CPTII,S$GLB,, | Performed by: STUDENT IN AN ORGANIZED HEALTH CARE EDUCATION/TRAINING PROGRAM

## 2023-11-13 PROCEDURE — 1125F PR PAIN SEVERITY QUANTIFIED, PAIN PRESENT: ICD-10-PCS | Mod: CPTII,S$GLB,, | Performed by: STUDENT IN AN ORGANIZED HEALTH CARE EDUCATION/TRAINING PROGRAM

## 2023-11-13 PROCEDURE — 99214 OFFICE O/P EST MOD 30 MIN: CPT | Mod: S$GLB,,, | Performed by: STUDENT IN AN ORGANIZED HEALTH CARE EDUCATION/TRAINING PROGRAM

## 2023-11-13 PROCEDURE — 3077F PR MOST RECENT SYSTOLIC BLOOD PRESSURE >= 140 MM HG: ICD-10-PCS | Mod: CPTII,S$GLB,, | Performed by: STUDENT IN AN ORGANIZED HEALTH CARE EDUCATION/TRAINING PROGRAM

## 2023-11-13 PROCEDURE — 3008F PR BODY MASS INDEX (BMI) DOCUMENTED: ICD-10-PCS | Mod: CPTII,S$GLB,, | Performed by: STUDENT IN AN ORGANIZED HEALTH CARE EDUCATION/TRAINING PROGRAM

## 2023-11-13 PROCEDURE — 4010F PR ACE/ARB THEARPY RXD/TAKEN: ICD-10-PCS | Mod: CPTII,S$GLB,, | Performed by: STUDENT IN AN ORGANIZED HEALTH CARE EDUCATION/TRAINING PROGRAM

## 2023-11-13 PROCEDURE — 1125F AMNT PAIN NOTED PAIN PRSNT: CPT | Mod: CPTII,S$GLB,, | Performed by: STUDENT IN AN ORGANIZED HEALTH CARE EDUCATION/TRAINING PROGRAM

## 2023-11-13 PROCEDURE — 4010F ACE/ARB THERAPY RXD/TAKEN: CPT | Mod: CPTII,S$GLB,, | Performed by: STUDENT IN AN ORGANIZED HEALTH CARE EDUCATION/TRAINING PROGRAM

## 2023-11-13 PROCEDURE — 1101F PT FALLS ASSESS-DOCD LE1/YR: CPT | Mod: CPTII,S$GLB,, | Performed by: STUDENT IN AN ORGANIZED HEALTH CARE EDUCATION/TRAINING PROGRAM

## 2023-11-13 PROCEDURE — 93005 RHYTHM STRIP: ICD-10-PCS | Mod: S$GLB,,, | Performed by: STUDENT IN AN ORGANIZED HEALTH CARE EDUCATION/TRAINING PROGRAM

## 2023-11-13 PROCEDURE — 93010 ELECTROCARDIOGRAM REPORT: CPT | Mod: S$GLB,,, | Performed by: INTERNAL MEDICINE

## 2023-11-13 PROCEDURE — 99999 PR PBB SHADOW E&M-EST. PATIENT-LVL II: ICD-10-PCS | Mod: PBBFAC,,, | Performed by: STUDENT IN AN ORGANIZED HEALTH CARE EDUCATION/TRAINING PROGRAM

## 2023-11-13 PROCEDURE — 99999 PR PBB SHADOW E&M-EST. PATIENT-LVL II: CPT | Mod: PBBFAC,,, | Performed by: STUDENT IN AN ORGANIZED HEALTH CARE EDUCATION/TRAINING PROGRAM

## 2023-11-13 PROCEDURE — 3288F FALL RISK ASSESSMENT DOCD: CPT | Mod: CPTII,S$GLB,, | Performed by: STUDENT IN AN ORGANIZED HEALTH CARE EDUCATION/TRAINING PROGRAM

## 2023-11-13 PROCEDURE — 3078F PR MOST RECENT DIASTOLIC BLOOD PRESSURE < 80 MM HG: ICD-10-PCS | Mod: CPTII,S$GLB,, | Performed by: STUDENT IN AN ORGANIZED HEALTH CARE EDUCATION/TRAINING PROGRAM

## 2023-11-13 PROCEDURE — 93010 RHYTHM STRIP: ICD-10-PCS | Mod: S$GLB,,, | Performed by: INTERNAL MEDICINE

## 2023-11-13 PROCEDURE — 93005 ELECTROCARDIOGRAM TRACING: CPT | Mod: S$GLB,,, | Performed by: STUDENT IN AN ORGANIZED HEALTH CARE EDUCATION/TRAINING PROGRAM

## 2023-11-13 RX ORDER — CARVEDILOL 3.12 MG/1
3.12 TABLET ORAL 2 TIMES DAILY WITH MEALS
Qty: 60 TABLET | Refills: 11 | Status: SHIPPED | OUTPATIENT
Start: 2023-11-13 | End: 2024-11-12

## 2023-11-13 NOTE — PROGRESS NOTES
Electrophysiology Clinic Note    Reason for follow-up patient visit: Ongoing evaluation and routine device surveillance of a dual-chamber pacemaker, implanted 6/2/2022 in the setting of complete heart block.    PRESENTING HISTORY:     History of Present Illness:  Ms. Morelia Holder is a araceli 73-year-old woman who returns to clinic today for ongoing evaluation and routine device surveillance of a dual-chamber pacemaker, implanted 6/2/2022 in the setting of complete heart block. She has a past medical history significant for periods of complete heart block, s/p implantation of a dual-chamber pacemaker on 6/2/2022, paroxysmal atrial fibrillation, hypertension, hyperlipidemia, type II diabetes mellitus, mild persistent asthma, GERD, restless leg syndrome, and obesity.     In brief review of her EP history, she is followed in general cardiology clinic with Dr. Witt and was seen following a surveillance colonoscopy that revealed a period of complete heart block. In clinic, she reported symptoms of generalized fatigue, worsened shortness of breath, and exercise intolerance. Her systolic function was preserved on echocardiogram, with her baseline ECG revealing complete heart block with incomplete RBBB. She underwent successful device implantation on 6/2/2022. She remains on oral anticoagulation with apixaban 5mg po BID with no adverse bleeding events reported.      In discussion with Ms. Holder today, she tells me that she is feeling overall quite well. Since undergoing device implantation, she denies any recurrent symptoms of exercise intolerance, and reports that her shortness of breath has improved somewhat. She denies any episodes of dizziness, lightheadedness, syncope/presyncope, palpitations, chest pain or chest discomfort, nausea or vomiting, orthopnea, or PND. She reports mild lower extremity edema, and reports that her baseline shortness of breath and dyspnea with exertion has remained largely  unchanged. She often naps during the day. She can climb more than one flight of stairs prior to needing to take a break, and continues to attempt to increase her level of physical activity. She recently walked around her neighborhood with her grandchildren for an early trick or treating event without limitation, but reports she was tired following her walking. She reports continued back and leg pain.      Review of Systems:  Review of Systems   Constitutional:  Negative for activity change.   HENT:  Negative for nasal congestion, nosebleeds, postnasal drip, rhinorrhea, sinus pressure/congestion and sneezing.    Respiratory:  Positive for shortness of breath. Negative for apnea, cough, chest tightness and wheezing.    Cardiovascular:  Positive for leg swelling. Negative for chest pain and palpitations.   Gastrointestinal:  Negative for abdominal distention, abdominal pain, blood in stool, change in bowel habit, constipation, diarrhea, nausea and vomiting.   Genitourinary:  Negative for dysuria and hematuria.   Musculoskeletal:  Positive for arthralgias and back pain. Negative for gait problem.   Neurological:  Negative for dizziness, seizures, syncope, weakness, light-headedness, headaches and coordination difficulties.   Psychiatric/Behavioral:  Positive for sleep disturbance.         PAST HISTORY:     Past Medical History:   Diagnosis Date    Asthma     Diabetes mellitus     GERD (gastroesophageal reflux disease)     Insomnia     Muscle spasm     Restless leg        Past Surgical History:   Procedure Laterality Date    A-V CARDIAC PACEMAKER INSERTION Bilateral 6/2/2022    Procedure: INSERTION, CARDIAC PACEMAKER, DUAL CHAMBER;  Surgeon: ESTEBAN Marie MD;  Location: [facility] EP LAB;  Service: Cardiology;  Laterality: Bilateral;  CHB, DUAL PPM, MDT, ANES, ED29    BLADDER SUSPENSION      CHOLECYSTECTOMY      COLONOSCOPY      COLONOSCOPY N/A 5/16/2022    Procedure: COLONOSCOPY;  Surgeon: Mercedes Lees MD;   [facility];  Service: Endoscopy;  Laterality: N/A;    incisional hernia repair      UPPER GASTROINTESTINAL ENDOSCOPY         Family History:  Family History   Problem Relation Age of Onset    Breast cancer Neg Hx     Ovarian cancer Neg Hx     Cervical cancer Neg Hx     Endometrial cancer Neg Hx     Vaginal cancer Neg Hx        Social History:  She  reports that she has never smoked. She has never used smokeless tobacco. She reports that she does not currently use drugs. She reports that she does not drink alcohol.      MEDICATIONS & ALLERGIES:     Review of patient's allergies indicates:   Allergen Reactions    Sulfa (sulfonamide antibiotics) Nausea Only    Statins-hmg-coa reductase inhibitors     Sulfabenzamide        Current Outpatient Medications on File Prior to Visit   Medication Sig Dispense Refill    amLODIPine (NORVASC) 10 MG tablet Take 10 mg by mouth once daily.      apixaban (ELIQUIS) 5 mg Tab Take 1 tablet (5 mg total) by mouth 2 (two) times daily. 90 tablet 3    blood sugar diagnostic (CONTOUR NEXT TEST STRIPS MISC) Contour Next Test Strips      colestipol (COLESTID) 1 gram Tab       DULoxetine (CYMBALTA) 20 MG capsule Take 20 mg by mouth once daily.      insulin glargine 100 units/mL (3mL) SubQ pen Inject 20 Units into the skin every evening.      lancets (MICROLET LANCET MISC) Microlet Lancet   TEST TWICE A DAY      losartan (COZAAR) 25 MG tablet Take 25 mg by mouth once daily.      meloxicam (MOBIC) 7.5 MG tablet Take 7.5 mg by mouth once daily.      metFORMIN (GLUCOPHAGE) 500 MG tablet Take 1,000 mg by mouth 2 (two) times daily with meals.       multivit-calc-iron-FA-K-hb#244 -80 mg-mcg-mcg Tab [employer]   Take one tablet by mouth every day.      vitamin B complex 100  2-herbs 100 mg Tab vitamin B complex   Take one tablet by mouth every day.      zolpidem (AMBIEN) 10 mg Tab Take 10 mg by mouth nightly as needed.       No current facility-administered medications on file  "prior to visit.        OBJECTIVE:     Vital Signs:  BP (!) 162/72   Pulse 60   Ht 5' 6" (1.676 m)   Wt 108 kg (238 lb 1.6 oz)   BMI 38.43 kg/m²     Physical Exam:  Physical Exam  Constitutional:       General: She is not in acute distress.     Appearance: Normal appearance. She is obese. She is not ill-appearing or diaphoretic.      Comments: Well-appearing woman in NAD.   HENT:      Head: Normocephalic and atraumatic.      Nose: Nose normal.      Mouth/Throat:      Mouth: Mucous membranes are moist.      Pharynx: Oropharynx is clear.   Eyes:      Pupils: Pupils are equal, round, and reactive to light.   Cardiovascular:      Rate and Rhythm: Normal rate and regular rhythm.      Pulses: Normal pulses.      Heart sounds: Normal heart sounds. No murmur heard.     No friction rub. No gallop.      Comments: Left anterior chest wall incision site is well-healed, with device freely mobile within the pocket.   Pulmonary:      Effort: Pulmonary effort is normal. No respiratory distress.      Breath sounds: Normal breath sounds. No wheezing, rhonchi or rales.   Chest:      Chest wall: No tenderness.   Abdominal:      General: There is no distension.      Palpations: Abdomen is soft.      Tenderness: There is no abdominal tenderness.   Musculoskeletal:         General: No swelling or tenderness.      Cervical back: Normal range of motion.      Right lower leg: No edema.      Left lower leg: No edema.   Skin:     General: Skin is warm and dry.      Findings: No erythema, lesion or rash.   Neurological:      General: No focal deficit present.      Mental Status: She is alert and oriented to person, place, and time. Mental status is at baseline.      Motor: No weakness.      Gait: Gait normal.   Psychiatric:         Mood and Affect: Mood normal.         Behavior: Behavior normal.        Laboratory Data:  Lab Results   Component Value Date    WBC 4.83 09/18/2023    HGB 13.8 09/18/2023    HCT 40.8 09/18/2023    MCV 93 " 09/18/2023     09/18/2023     Lab Results   Component Value Date     (H) 09/18/2023     09/18/2023    K 4.4 09/18/2023     09/18/2023    CO2 25 09/18/2023    BUN 24 (H) 09/18/2023    CREATININE 1.13 09/18/2023    CALCIUM 8.8 09/18/2023    MG 1.6 06/02/2022     Lab Results   Component Value Date    INR 1.0 06/02/2022       Pertinent Cardiac Data:  ECG: Atrially-paced, ventricularly-paced rhythm with rate of 60 bpm, AV delay 210 ms, paced  ms, QT/QTc 470 ms.     Resting 2D Transthoracic Echocardiogram - 6/2/2022:  The left ventricle is mildly enlarged with eccentric hypertrophy and normal systolic function. The estimated ejection fraction is 65%.  Normal right ventricular size with normal right ventricular systolic function.  Normal left ventricular diastolic function.  Mild-to-moderate mitral regurgitation.  The estimated PA systolic pressure is 33 mmHg.  Normal central venous pressure (3 mmHg).    Device Interrogation: Personal review of her device interrogation report ([employer] MRI, implanted 6/2/2022) reveals adequate battery longevity with an estimated 8.5 - 9.2 years of battery life remaining. Her leads were interrogated with acceptable and stable lead parameters. She is presently AP-, with underlying sinus bradycardia with junctional escape at 30 bpm. She is RA paced 60%, and RV paced 98%. She has had continued episodes of atrial fibrillation, with the longest event lasting 1 day and 22 hours on 9/9/2023. Her overall AT/AF burden is 29% with excellent rate control. There are no concerning AHR or VHR episodes noted. She was reprogrammed to DDDR in an effort to minimize her pacing.       ASSESSMENT & PLAN:   Ms. Morelia Holder is a araceli 73-year-old woman who returns to clinic today for ongoing evaluation and routine device surveillance of a dual-chamber pacemaker, implanted 6/2/2022 in the setting of complete heart block. She has a past medical  history significant for periods of complete heart block, s/p implantation of a dual-chamber pacemaker on 6/2/2022, paroxysmal atrial fibrillation, hypertension, hyperlipidemia, type II diabetes mellitus, mild persistent asthma, GERD, restless leg syndrome, and obesity.     - She has a normally functioning dual-chamber pacemaker on device interrogation today. We discussed continued remote transmissions every three months, with her next in-office transmission in six months.  - She has preserved systolic function, LVEF 65%, although she has an increased RV pacing near 100% pacing burden. Her paced QRSd is wide at 158ms. She reports symptoms of lower extremity edema, but denies any additional signs or symptoms of volume overload. We discussed that she may require device upgrade to a CRT-P with implantation of a coronary sinus lead. In the event her systolic function decreases, or in the event she develops heart failure, we will revisit the topic of device upgrade. We have reprogrammed her device today to attempt to decrease her  burden.   - She is presently in sinus bradycardia with complete heart block. She has continued to have paroxysms of atrial fibrillation, with an increased overall AT/AF burden of 29% on interrogation today. We will start carvedilol 3.125mg po BID for improved rate control, in addition to continuation of oral anticoagulation with apixaban 5mg po BID. She denies any adverse bleeding events. In the event her atrial fibrillation burden increases on remote transmissions, we may consider initiation of antiarrhythmic therapy.       This patient will return to clinic with me in six months, with continued general cardiology and PCP appointments in the interm. All questions and concerns were addressed at this encounter.     Signing Physician:       INDER Marie MD  Electrophysiology Attending

## 2023-11-20 PROBLEM — I48.0 PAROXYSMAL ATRIAL FIBRILLATION: Status: ACTIVE | Noted: 2023-11-20

## 2023-11-20 PROBLEM — G25.81 RLS (RESTLESS LEGS SYNDROME): Status: ACTIVE | Noted: 2023-11-20

## 2023-11-20 PROBLEM — J45.30 MILD PERSISTENT ASTHMA WITHOUT COMPLICATION: Status: ACTIVE | Noted: 2023-11-20

## 2023-11-24 ENCOUNTER — CLINICAL SUPPORT (OUTPATIENT)
Dept: CARDIOLOGY | Facility: HOSPITAL | Age: 73
End: 2023-11-24
Payer: MEDICARE

## 2023-11-24 DIAGNOSIS — Z95.0 PRESENCE OF CARDIAC PACEMAKER: ICD-10-CM

## 2023-11-24 PROCEDURE — 93296 REM INTERROG EVL PM/IDS: CPT | Performed by: STUDENT IN AN ORGANIZED HEALTH CARE EDUCATION/TRAINING PROGRAM

## 2024-02-09 DIAGNOSIS — I44.2 COMPLETE HEART BLOCK: Primary | ICD-10-CM

## 2024-02-24 ENCOUNTER — CLINICAL SUPPORT (OUTPATIENT)
Dept: CARDIOLOGY | Facility: HOSPITAL | Age: 74
End: 2024-02-24
Attending: STUDENT IN AN ORGANIZED HEALTH CARE EDUCATION/TRAINING PROGRAM
Payer: MEDICARE

## 2024-02-24 ENCOUNTER — CLINICAL SUPPORT (OUTPATIENT)
Dept: CARDIOLOGY | Facility: HOSPITAL | Age: 74
End: 2024-02-24
Payer: MEDICARE

## 2024-02-24 DIAGNOSIS — I44.2 ATRIOVENTRICULAR BLOCK, COMPLETE: ICD-10-CM

## 2024-02-24 DIAGNOSIS — Z95.0 PRESENCE OF CARDIAC PACEMAKER: ICD-10-CM

## 2024-02-24 DIAGNOSIS — R00.1 BRADYCARDIA, UNSPECIFIED: ICD-10-CM

## 2024-02-24 PROCEDURE — 93296 REM INTERROG EVL PM/IDS: CPT | Performed by: STUDENT IN AN ORGANIZED HEALTH CARE EDUCATION/TRAINING PROGRAM

## 2024-02-24 PROCEDURE — 93294 REM INTERROG EVL PM/LDLS PM: CPT | Mod: ,,, | Performed by: STUDENT IN AN ORGANIZED HEALTH CARE EDUCATION/TRAINING PROGRAM

## 2024-03-20 LAB
OHS CV AF BURDEN PERCENT: 21
OHS CV DC REMOTE DEVICE TYPE: NORMAL
OHS CV ICD SHOCK: NO
OHS CV RV PACING PERCENT: 98 %

## 2024-03-22 RX ORDER — APIXABAN 5 MG/1
5 TABLET, FILM COATED ORAL 2 TIMES DAILY
Qty: 180 TABLET | Refills: 3 | Status: SHIPPED | OUTPATIENT
Start: 2024-03-22

## 2024-05-16 ENCOUNTER — TELEPHONE (OUTPATIENT)
Dept: ELECTROPHYSIOLOGY | Facility: CLINIC | Age: 74
End: 2024-05-16
Payer: MEDICARE

## 2024-06-04 PROBLEM — I50.9 NEW ONSET OF CONGESTIVE HEART FAILURE: Status: ACTIVE | Noted: 2024-06-04

## 2024-06-04 PROBLEM — E03.9 HYPOTHYROIDISM: Status: RESOLVED | Noted: 2019-03-18 | Resolved: 2024-06-04

## 2024-06-04 PROBLEM — J96.02 ACUTE RESPIRATORY FAILURE WITH HYPOXIA AND HYPERCARBIA: Status: ACTIVE | Noted: 2024-06-04

## 2024-06-04 PROBLEM — I50.9 ACUTE EXACERBATION OF CHF (CONGESTIVE HEART FAILURE): Status: RESOLVED | Noted: 2024-06-04 | Resolved: 2024-06-04

## 2024-06-04 PROBLEM — R06.02 SOB (SHORTNESS OF BREATH): Status: RESOLVED | Noted: 2024-06-04 | Resolved: 2024-06-04

## 2024-06-04 PROBLEM — J06.9 UPPER RESPIRATORY INFECTION: Status: ACTIVE | Noted: 2024-06-04

## 2024-06-04 PROBLEM — I21.4 NSTEMI (NON-ST ELEVATED MYOCARDIAL INFARCTION): Status: ACTIVE | Noted: 2024-06-04

## 2024-06-04 PROBLEM — J96.01 ACUTE RESPIRATORY FAILURE WITH HYPOXIA AND HYPERCARBIA: Status: ACTIVE | Noted: 2024-06-04

## 2024-06-04 PROBLEM — R06.02 SOB (SHORTNESS OF BREATH): Status: ACTIVE | Noted: 2024-06-04

## 2024-06-04 PROBLEM — I50.9 ACUTE EXACERBATION OF CHF (CONGESTIVE HEART FAILURE): Status: ACTIVE | Noted: 2024-06-04

## 2024-06-04 PROBLEM — R79.89 ELEVATED TROPONIN LEVEL NOT DUE TO ACUTE CORONARY SYNDROME: Status: ACTIVE | Noted: 2024-06-04

## 2024-06-05 ENCOUNTER — PATIENT MESSAGE (OUTPATIENT)
Dept: CARDIOLOGY | Facility: CLINIC | Age: 74
End: 2024-06-05
Payer: MEDICARE

## 2024-06-05 PROBLEM — J96.02 ACUTE RESPIRATORY FAILURE WITH HYPOXIA AND HYPERCARBIA: Status: RESOLVED | Noted: 2024-06-04 | Resolved: 2024-06-05

## 2024-06-05 PROBLEM — I21.4 NSTEMI (NON-ST ELEVATED MYOCARDIAL INFARCTION): Status: RESOLVED | Noted: 2024-06-04 | Resolved: 2024-06-05

## 2024-06-05 PROBLEM — I44.2 COMPLETE HEART BLOCK: Status: RESOLVED | Noted: 2019-03-26 | Resolved: 2024-06-05

## 2024-06-05 PROBLEM — J96.01 ACUTE RESPIRATORY FAILURE WITH HYPOXIA AND HYPERCARBIA: Status: RESOLVED | Noted: 2024-06-04 | Resolved: 2024-06-05

## 2024-06-11 ENCOUNTER — CLINICAL SUPPORT (OUTPATIENT)
Dept: CARDIOLOGY | Facility: HOSPITAL | Age: 74
End: 2024-06-11
Attending: STUDENT IN AN ORGANIZED HEALTH CARE EDUCATION/TRAINING PROGRAM
Payer: MEDICARE

## 2024-06-11 ENCOUNTER — CLINICAL SUPPORT (OUTPATIENT)
Dept: CARDIOLOGY | Facility: HOSPITAL | Age: 74
End: 2024-06-11
Payer: MEDICARE

## 2024-06-11 DIAGNOSIS — R00.1 BRADYCARDIA, UNSPECIFIED: ICD-10-CM

## 2024-06-11 DIAGNOSIS — I44.2 ATRIOVENTRICULAR BLOCK, COMPLETE: ICD-10-CM

## 2024-06-11 DIAGNOSIS — Z95.0 PRESENCE OF CARDIAC PACEMAKER: ICD-10-CM

## 2024-06-11 PROCEDURE — 93294 REM INTERROG EVL PM/LDLS PM: CPT | Mod: ,,, | Performed by: STUDENT IN AN ORGANIZED HEALTH CARE EDUCATION/TRAINING PROGRAM

## 2024-06-11 PROCEDURE — 93296 REM INTERROG EVL PM/IDS: CPT | Performed by: STUDENT IN AN ORGANIZED HEALTH CARE EDUCATION/TRAINING PROGRAM

## 2024-06-13 ENCOUNTER — TELEPHONE (OUTPATIENT)
Dept: CARDIOLOGY | Facility: CLINIC | Age: 74
End: 2024-06-13
Payer: MEDICARE

## 2024-06-13 NOTE — TELEPHONE ENCOUNTER
Reached out to Ms. Thibodeaux to reschedule her appt. Her granddaughter has an appt that day so we rescheduled her to 7/9/24 @10 am.  Thank you,    Mayra  Medical Assistant

## 2024-06-18 LAB
OHS CV AF BURDEN PERCENT: 24
OHS CV DC REMOTE DEVICE TYPE: NORMAL
OHS CV ICD SHOCK: NO
OHS CV RV PACING PERCENT: 99 %

## 2024-07-09 ENCOUNTER — OFFICE VISIT (OUTPATIENT)
Dept: CARDIOLOGY | Facility: CLINIC | Age: 74
End: 2024-07-09
Payer: MEDICARE

## 2024-07-09 VITALS
WEIGHT: 236.13 LBS | BODY MASS INDEX: 37.95 KG/M2 | DIASTOLIC BLOOD PRESSURE: 60 MMHG | HEART RATE: 60 BPM | SYSTOLIC BLOOD PRESSURE: 108 MMHG | OXYGEN SATURATION: 98 % | HEIGHT: 66 IN

## 2024-07-09 DIAGNOSIS — I50.42 CHRONIC COMBINED SYSTOLIC AND DIASTOLIC HEART FAILURE: Primary | ICD-10-CM

## 2024-07-09 DIAGNOSIS — Z79.4 TYPE 2 DIABETES MELLITUS WITHOUT COMPLICATION, WITH LONG-TERM CURRENT USE OF INSULIN: ICD-10-CM

## 2024-07-09 DIAGNOSIS — I50.9 ACUTE CONGESTIVE HEART FAILURE, UNSPECIFIED HEART FAILURE TYPE: ICD-10-CM

## 2024-07-09 DIAGNOSIS — I44.2 COMPLETE HEART BLOCK: ICD-10-CM

## 2024-07-09 DIAGNOSIS — I10 PRIMARY HYPERTENSION: ICD-10-CM

## 2024-07-09 DIAGNOSIS — I48.0 PAROXYSMAL ATRIAL FIBRILLATION: ICD-10-CM

## 2024-07-09 DIAGNOSIS — J96.02 ACUTE RESPIRATORY FAILURE WITH HYPOXIA AND HYPERCARBIA: ICD-10-CM

## 2024-07-09 DIAGNOSIS — Z95.0 PRESENCE OF CARDIAC PACEMAKER: ICD-10-CM

## 2024-07-09 DIAGNOSIS — E66.01 CLASS 2 SEVERE OBESITY DUE TO EXCESS CALORIES WITH SERIOUS COMORBIDITY AND BODY MASS INDEX (BMI) OF 38.0 TO 38.9 IN ADULT: ICD-10-CM

## 2024-07-09 DIAGNOSIS — R79.89 ELEVATED TROPONIN LEVEL NOT DUE TO ACUTE CORONARY SYNDROME: ICD-10-CM

## 2024-07-09 DIAGNOSIS — R94.39 ABNORMAL NUCLEAR STRESS TEST: ICD-10-CM

## 2024-07-09 DIAGNOSIS — I21.4 NON-ST ELEVATION (NSTEMI) MYOCARDIAL INFARCTION: ICD-10-CM

## 2024-07-09 DIAGNOSIS — E11.9 TYPE 2 DIABETES MELLITUS WITHOUT COMPLICATION, WITH LONG-TERM CURRENT USE OF INSULIN: ICD-10-CM

## 2024-07-09 DIAGNOSIS — J96.01 ACUTE RESPIRATORY FAILURE WITH HYPOXIA AND HYPERCARBIA: ICD-10-CM

## 2024-07-09 DIAGNOSIS — I50.9 NEW ONSET OF CONGESTIVE HEART FAILURE: ICD-10-CM

## 2024-07-09 PROBLEM — E66.812 CLASS 2 SEVERE OBESITY DUE TO EXCESS CALORIES WITH SERIOUS COMORBIDITY AND BODY MASS INDEX (BMI) OF 38.0 TO 38.9 IN ADULT: Status: ACTIVE | Noted: 2022-06-02

## 2024-07-09 PROCEDURE — 99999 PR PBB SHADOW E&M-EST. PATIENT-LVL IV: CPT | Mod: PBBFAC,,,

## 2024-07-09 PROCEDURE — 1160F RVW MEDS BY RX/DR IN RCRD: CPT | Mod: CPTII,S$GLB,,

## 2024-07-09 PROCEDURE — 4010F ACE/ARB THERAPY RXD/TAKEN: CPT | Mod: CPTII,S$GLB,,

## 2024-07-09 PROCEDURE — 99214 OFFICE O/P EST MOD 30 MIN: CPT | Mod: S$GLB,,,

## 2024-07-09 PROCEDURE — 3008F BODY MASS INDEX DOCD: CPT | Mod: CPTII,S$GLB,,

## 2024-07-09 PROCEDURE — 1101F PT FALLS ASSESS-DOCD LE1/YR: CPT | Mod: CPTII,S$GLB,,

## 2024-07-09 PROCEDURE — 3288F FALL RISK ASSESSMENT DOCD: CPT | Mod: CPTII,S$GLB,,

## 2024-07-09 PROCEDURE — 3074F SYST BP LT 130 MM HG: CPT | Mod: CPTII,S$GLB,,

## 2024-07-09 PROCEDURE — 3078F DIAST BP <80 MM HG: CPT | Mod: CPTII,S$GLB,,

## 2024-07-09 PROCEDURE — 1159F MED LIST DOCD IN RCRD: CPT | Mod: CPTII,S$GLB,,

## 2024-07-09 PROCEDURE — 3044F HG A1C LEVEL LT 7.0%: CPT | Mod: CPTII,S$GLB,,

## 2024-07-09 PROCEDURE — 1126F AMNT PAIN NOTED NONE PRSNT: CPT | Mod: CPTII,S$GLB,,

## 2024-07-09 RX ORDER — SACUBITRIL AND VALSARTAN 24; 26 MG/1; MG/1
1 TABLET, FILM COATED ORAL 2 TIMES DAILY
Qty: 60 TABLET | Refills: 6 | Status: SHIPPED | OUTPATIENT
Start: 2024-07-09

## 2024-07-09 RX ORDER — ASPIRIN 81 MG/1
81 TABLET ORAL DAILY
Qty: 90 TABLET | Refills: 3 | Status: SHIPPED | OUTPATIENT
Start: 2024-07-09 | End: 2025-07-09

## 2024-07-09 NOTE — ASSESSMENT & PLAN NOTE
-Patient CP free   -ASA 81 mg daily   -Cardiac echo LVEF 40% w GIIDD, regional wall motion abnormalities  -NM stress test 6/5/24: Fixed inferior wall perfusion defect   -Follow up with Cardiology outpatient- PET stress test

## 2024-07-09 NOTE — ASSESSMENT & PLAN NOTE
-Goal BP < 130/80  -in office 108/60  -continue amlodipine 10 mg, carvedilol to 6.25 mg, losartan to 50 mg (changed to Entresto)  -see  HF management

## 2024-07-09 NOTE — ASSESSMENT & PLAN NOTE
"She follows with [facility], ESTEBAN Kline MD, whom she last saw in clinic on 11/13/23 for a history of complete heart block s/p implantation of a dual-chamber pacemaker on 6/2/2022.  She noted, "she has preserved systolic function, LVEF 65%, although she has an increased RV pacing near 100% pacing burden. Her paced QRSd is wide at 158ms. She reports symptoms of lower extremity edema, but denies any additional signs or symptoms of volume overload. We discussed that she may require device upgrade to a CRT-P with implantation of a coronary sinus lead. In the event her systolic function decreases, or in the event she develops heart failure, we will revisit the topic of device upgrade. We have reprogrammed her device today to attempt to decrease her  burden."    -Cardiac echo 6/4/24  Summary    Left Ventricle: The left ventricle is mildly dilated. Normal wall thickness. There is eccentric hypertrophy. Regional wall motion abnormalities present. See diagram for wall motion findings. Septal motion is consistent with bundle branch block. There is mildly reduced systolic function with a visually estimated ejection fraction of 45 - 50%. Biplane (2D) method of discs ejection fraction is 40%. Grade II diastolic dysfunction. Elevated left ventricular filling pressure.  - appointment scheduled for 8/23/24   "

## 2024-07-09 NOTE — ASSESSMENT & PLAN NOTE
NM stress test 6/5/24  Interpretation Summary    The ECG portion of the study is uninterpretable due to ventricular paced rhythm.    The patient reported no chest pain during the stress test.    There were no arrhythmias during stress.    The nuclear portion of this study will be reported separately.  FINDINGS:  There is a fixed inferior wall perfusion defect.  The stress and rest end diastolic volumes measure 119 and 127 mL respectively.  The stress and rest end systolic volumes each measure 52 mL.  The stress and rest ejection fraction measure 56 and 59% respectively.     Impression:     Fixed inferior wall perfusion defect.    -Cardiac PET stress test for ischemic evaluation

## 2024-07-09 NOTE — PROGRESS NOTES
"Subjective:    Patient ID:  Morelia Holder is a 74 y.o. female who presents for follow-up of No chief complaint on file.      PCP: Melissa Moreau MD     Referring Provider:     HPI: Patient is a 75 yo F w/ PMH of  combined HF (LVEF 40% w GIIDD 6/4/24), CHB, S/P PPM 6/20/22, PAF on apixaban, HTN, HLD, asthma, GERD, DM2, and obesity who presents today for hospital follow up. She was previously followed by Cardiology and was last seen by Dr. Witt on 6/2/2022 for CHB and was sent to ED for admission for PPM placement. She presented to ED on 6/3/24 w SOB, cough, BLE edema. She also notes fatigue and MENDEZ with household activities. CXR- pulmonary edema, Cardiac echo revealed LVEF  40%. Grade II diastolic dysfunction. Elevated left ventricular filling pressure. Regional wall motion abnormalities. Troponin mildly elevated. NM stress test completed and revealed Fixed inferior wall perfusion defect. She was discharged home on medical therapy, HF management- furosemide 20 mg BID, spironolactone 25 mg, carvedilol 3.125 mg, losartan 25 mg, HTN management- amlodipine 10 mg, and apixaban 5 mg  w Cardiology follow up.      She is followed by EP and was last seen by Dr. Marie on 11/13/2023  for a history of complete heart block s/p implantation of a dual-chamber pacemaker on 6/2/2022.  She noted, "she has preserved systolic function, LVEF 65%, although she has an increased RV pacing near 100% pacing burden. Her paced QRSd is wide at 158ms. She reports symptoms of lower extremity edema, but denies any additional signs or symptoms of volume overload. We discussed that she may require device upgrade to a CRT-P with implantation of a coronary sinus lead. In the event her systolic function decreases, or in the event she develops heart failure, we will revisit the topic of device upgrade. We have reprogrammed her device today to attempt to decrease her  burden."      Since discharge reports doing well, but notes some " fatigue with activity. She also notes heat sensitivity.   Patient denies CP, SOB, palpitations, orthopnea, PND, presyncope, LOC, swelling, or claudication.   Patient does not monitor her home BP, notes her machine is not working.    Patient reports medication compliance without side effects  Patient does not exercise regularly.    Past Medical History:   Diagnosis Date    Asthma     Cardiomyopathy     CHF (congestive heart failure)     Complete heart block     Diabetes mellitus     GERD (gastroesophageal reflux disease)     Hypertension     Insomnia     Muscle spasm     Paroxysmal atrial fibrillation     Restless leg      Past Surgical History:   Procedure Laterality Date    A-V CARDIAC PACEMAKER INSERTION Bilateral 6/2/2022    Procedure: INSERTION, CARDIAC PACEMAKER, DUAL CHAMBER;  Surgeon: ESTEBAN Marie MD;  Location: [facility] EP LAB;  Service: Cardiology;  Laterality: Bilateral;  CHB, DUAL PPM, MDT, ANES, ED29    BLADDER SUSPENSION      CHOLECYSTECTOMY      COLONOSCOPY      COLONOSCOPY N/A 5/16/2022    Procedure: COLONOSCOPY;  Surgeon: Mercedes Lees MD;  Location: [facility] ENDO;  Service: Endoscopy;  Laterality: N/A;    incisional hernia repair      UPPER GASTROINTESTINAL ENDOSCOPY       Social History     Socioeconomic History    Marital status:    Tobacco Use    Smoking status: Never     Passive exposure: Never    Smokeless tobacco: Never   Substance and Sexual Activity    Alcohol use: No    Drug use: Not Currently    Sexual activity: Not Currently     Family History   Problem Relation Name Age of Onset    Breast cancer Neg Hx      Ovarian cancer Neg Hx      Cervical cancer Neg Hx      Endometrial cancer Neg Hx      Vaginal cancer Neg Hx         Review of patient's allergies indicates:   Allergen Reactions    Sulfa (sulfonamide antibiotics) Nausea Only    Statins-hmg-coa reductase inhibitors     Sulfabenzamide        Medication List with Changes/Refills   New Medications    ASPIRIN (ECOTRIN) 81 MG EC  "TABLET    Take 1 tablet (81 mg total) by mouth once daily.    SACUBITRIL-VALSARTAN (ENTRESTO) 24-26 MG PER TABLET    Take 1 tablet by mouth 2 (two) times daily.   Current Medications    AMLODIPINE (NORVASC) 10 MG TABLET    Take 10 mg by mouth once daily.    BLOOD SUGAR DIAGNOSTIC (CONTOUR NEXT TEST STRIPS MISC)    Contour Next Test Strips    CARVEDILOL (COREG) 3.125 MG TABLET    Take 1 tablet (3.125 mg total) by mouth 2 (two) times daily with meals.    COLESTIPOL (COLESTID) 1 GRAM TAB        DULOXETINE (CYMBALTA) 20 MG CAPSULE    Take 20 mg by mouth once daily.    ELIQUIS 5 MG TAB    Take 1 tablet by mouth twice daily    FUROSEMIDE (LASIX) 20 MG TABLET    Take 1 tablet (20 mg total) by mouth 2 (two) times daily.    INSULIN GLARGINE 100 UNITS/ML (3ML) SUBQ PEN    Inject 20 Units into the skin every evening.    LANCETS (MICROLET LANCET MISC)    Microlet Lancet   TEST TWICE A DAY    LOSARTAN (COZAAR) 25 MG TABLET    Take 25 mg by mouth once daily.    METFORMIN (GLUCOPHAGE) 500 MG TABLET    Take 1,000 mg by mouth 2 (two) times daily with meals.     MULTIVIT-CALC-IRON-FA-K-HB#244 -80 MG-MCG-MCG TAB    [employer]   Take one tablet by mouth every day.    SPIRONOLACTONE (ALDACTONE) 25 MG TABLET    Take 1 tablet (25 mg total) by mouth once daily.    VITAMIN B COMPLEX 100  2-HERBS 100 MG TAB    vitamin B complex   Take one tablet by mouth every day.    ZOLPIDEM (AMBIEN) 10 MG TAB    Take 10 mg by mouth nightly as needed.   Discontinued Medications    MELOXICAM (MOBIC) 7.5 MG TABLET    Take 7.5 mg by mouth once daily.       ROS     Objective:   /60 (BP Location: Left arm, Patient Position: Sitting, BP Method: Large (Manual))   Pulse 60   Ht 5' 6" (1.676 m)   Wt 107.1 kg (236 lb 1.8 oz)   SpO2 98%   BMI 38.11 kg/m²    Physical Exam      NM stress test 6/5/24  Interpretation Summary    The ECG portion of the study is uninterpretable due to ventricular paced rhythm.    The patient reported no chest pain " during the stress test.    There were no arrhythmias during stress.    The nuclear portion of this study will be reported separately.  FINDINGS:  There is a fixed inferior wall perfusion defect.  The stress and rest end diastolic volumes measure 119 and 127 mL respectively.  The stress and rest end systolic volumes each measure 52 mL.  The stress and rest ejection fraction measure 56 and 59% respectively.     Impression:     Fixed inferior wall perfusion defect.    Cardiac echo 6/4/24  Summary    Left Ventricle: The left ventricle is mildly dilated. Normal wall thickness. There is eccentric hypertrophy. Regional wall motion abnormalities present. See diagram for wall motion findings. Septal motion is consistent with bundle branch block. There is mildly reduced systolic function with a visually estimated ejection fraction of 45 - 50%. Biplane (2D) method of discs ejection fraction is 40%. Grade II diastolic dysfunction. Elevated left ventricular filling pressure.    Right Ventricle: Normal right ventricular cavity size. Wall thickness is normal. Systolic function is normal.    Mitral Valve: There is mild regurgitation.    Tricuspid Valve: There is mild regurgitation.    Pulmonic Valve: There is mild regurgitation.    Pulmonary Artery: The estimated pulmonary artery systolic pressure is 37 mmHg.    IVC/SVC: Normal venous pressure at 3 mmHg.    EKG 6/3/24- reviewed Atrial -sensed -V paced   PPM placement 6/2/2022- Dr. Marie   Interpretation Summary  Successful implantation of a dual-chamber pacemaker    -Tendril (brand) - [employer]   -Assurity MRI     Cardiac echo 6/2/2022  Summary  The left ventricle is mildly enlarged with eccentric hypertrophy and normal systolic function. The estimated ejection fraction is 65%.  Normal right ventricular size with normal right ventricular systolic function.  Normal left ventricular diastolic function.  Mild-to-moderate mitral regurgitation.  The estimated PA systolic pressure is 33  mmHg.  Normal central venous pressure (3 mmHg).       EK2022- reviewed. Sinus rhythm with complete heart block and Junctional rhythm with Fusion complexes ,Left axis deviation. RBBB, Possible Anterolateral infarct (cited on or before 2022)     EC2022- reviewed.  3rd degree AVB, Low voltage, RBBB.  Significant change as compared to prior.       Assessment:       1. Chronic combined systolic and diastolic heart failure    2. Acute congestive heart failure, unspecified heart failure type    3. Primary hypertension    4. Paroxysmal atrial fibrillation    5. Complete heart block    6. Abnormal nuclear stress test    7. Presence of cardiac pacemaker    8. Elevated troponin level not due to acute coronary syndrome    9. Acute respiratory failure with hypoxia and hypercarbia [J96.01, J96.02]    10. Type 2 diabetes mellitus without complication, with long-term current use of insulin    11. Class 2 severe obesity due to excess calories with serious comorbidity and body mass index (BMI) of 38.0 to 38.9 in adult    12. Non-ST elevation (NSTEMI) myocardial infarction    13. New onset of congestive heart failure         Plan:         Chronic combined systolic and diastolic heart failure  Patient is identified as having Combined Systolic and Diastolic heart failure that is Chronic. CHF is currently controlled. Latest ECHO performed and demonstrates- Results for orders placed during the hospital encounter of 24    Echo Saline Bubble? No; Ultrasound enhancing contrast? Yes    Interpretation Summary    Left Ventricle: The left ventricle is mildly dilated. Normal wall thickness. There is eccentric hypertrophy. Regional wall motion abnormalities present. See diagram for wall motion findings. Septal motion is consistent with bundle branch block. There is mildly reduced systolic function with a visually estimated ejection fraction of 45 - 50%. Biplane (2D) method of discs ejection fraction is 40%. Grade II  diastolic dysfunction. Elevated left ventricular filling pressure.    Right Ventricle: Normal right ventricular cavity size. Wall thickness is normal. Systolic function is normal.    Mitral Valve: There is mild regurgitation.    Tricuspid Valve: There is mild regurgitation.    Pulmonic Valve: There is mild regurgitation.    Pulmonary Artery: The estimated pulmonary artery systolic pressure is 37 mmHg.    IVC/SVC: Normal venous pressure at 3 mmHg.  . Continue Beta Blocker, ACE/ARB, Furosemide, and Aldactone and monitor clinical status closely. Monitor on telemetry. Patient is on CHF pathway.  Monitor strict Is&Os and daily weights.  Place on fluid restriction of 2 L. Cardiology has been consulted. Continue to stress to patient importance of self efficacy and  on diet for CHF. Last BNP reviewed- and noted below @LABRCNTIP(BNP,BNPTRIAGEBLO)@    -current medical therapy- furosemide 20 mg BID, spironolactone 25 mg, carvedilol 3.125 mg, losartan 25 mg  -DC losartan  -add Entreesto 24-26 mg  -BMP in 3 weeks   -may add SGLT2 Inhibitor if GFR improves   -weigh self daily, notify office if > 3 pound weight gain in 1 day or 5 pound gain in 1 week  -Low salt diet     Hypertension  -Goal BP < 130/80  -in office 108/60  -continue amlodipine 10 mg, carvedilol to 6.25 mg, losartan to 50 mg (changed to Entresto)  -see  HF management             Paroxysmal atrial fibrillation  Patient with Permanent atrial fibrillation which is controlled currently with Beta Blocker. Patient is currently in paced rhthym.  [id number] Score: 3.  Anticoagulation indicated. Anticoagulation done with Eliquis .    apixaban tablet 5 mg, 5 mg, Oral, BID     carvediloL tablet 6.25 mg     Complete heart block  She is follow by EP- Dr Marie, S/P PPM   -follow up appt scheduled for 8/23/24     Presence of cardiac pacemaker  She follows with [facility]EP, ESTEBAN Kline MD, whom she last saw in clinic on 11/13/23 for a history of complete heart  "block s/p implantation of a dual-chamber pacemaker on 6/2/2022.  She noted, "she has preserved systolic function, LVEF 65%, although she has an increased RV pacing near 100% pacing burden. Her paced QRSd is wide at 158ms. She reports symptoms of lower extremity edema, but denies any additional signs or symptoms of volume overload. We discussed that she may require device upgrade to a CRT-P with implantation of a coronary sinus lead. In the event her systolic function decreases, or in the event she develops heart failure, we will revisit the topic of device upgrade. We have reprogrammed her device today to attempt to decrease her  burden."    -Cardiac echo 6/4/24  Summary    Left Ventricle: The left ventricle is mildly dilated. Normal wall thickness. There is eccentric hypertrophy. Regional wall motion abnormalities present. See diagram for wall motion findings. Septal motion is consistent with bundle branch block. There is mildly reduced systolic function with a visually estimated ejection fraction of 45 - 50%. Biplane (2D) method of discs ejection fraction is 40%. Grade II diastolic dysfunction. Elevated left ventricular filling pressure.  - appointment scheduled for 8/23/24     Elevated troponin level not due to acute coronary syndrome  -Patient CP free   -ASA 81 mg daily   -Cardiac echo LVEF 40% w GIIDD, regional wall motion abnormalities  -NM stress test 6/5/24: Fixed inferior wall perfusion defect   -Follow up with Cardiology outpatient- PET stress test     Type 2 diabetes mellitus without complication, with long-term current use of insulin  -Goal A1c < 7%   -A1c 6.6 6/4/24   -followed by PCP   -continue medical  therapy- Insulin and metformin         Class 2 severe obesity due to excess calories with serious comorbidity and body mass index (BMI) of 38.0 to 38.9 in adult  Body mass index is 38.11 kg/m². Morbid obesity complicates all aspects of disease management from diagnostic modalities to treatment. " Weight loss encouraged and health benefits explained to patient.         Abnormal nuclear stress test  NM stress test 6/5/24  Interpretation Summary    The ECG portion of the study is uninterpretable due to ventricular paced rhythm.    The patient reported no chest pain during the stress test.    There were no arrhythmias during stress.    The nuclear portion of this study will be reported separately.  FINDINGS:  There is a fixed inferior wall perfusion defect.  The stress and rest end diastolic volumes measure 119 and 127 mL respectively.  The stress and rest end systolic volumes each measure 52 mL.  The stress and rest ejection fraction measure 56 and 59% respectively.     Impression:     Fixed inferior wall perfusion defect.    -Cardiac PET stress test for ischemic evaluation       Total duration of face to face visit time 30 minutes.  Total time spent counseling greater than fifty percent of total visit time.  Counseling included discussion regarding imaging findings, diagnosis, possibilities, treatment options, risks and benefits.  The patient had many questions regarding the options and long-term effects      Herman Gardiner, HENRY  Cardiology

## 2024-07-09 NOTE — ASSESSMENT & PLAN NOTE
Patient with Permanent atrial fibrillation which is controlled currently with Beta Blocker. Patient is currently in paced rhthym.  [id number] Score: 3.  Anticoagulation indicated. Anticoagulation done with Eliquis .    apixaban tablet 5 mg, 5 mg, Oral, BID     carvediloL tablet 6.25 mg

## 2024-07-09 NOTE — ASSESSMENT & PLAN NOTE
Patient is identified as having Combined Systolic and Diastolic heart failure that is Chronic. CHF is currently controlled. Latest ECHO performed and demonstrates- Results for orders placed during the hospital encounter of 06/03/24    Echo Saline Bubble? No; Ultrasound enhancing contrast? Yes    Interpretation Summary    Left Ventricle: The left ventricle is mildly dilated. Normal wall thickness. There is eccentric hypertrophy. Regional wall motion abnormalities present. See diagram for wall motion findings. Septal motion is consistent with bundle branch block. There is mildly reduced systolic function with a visually estimated ejection fraction of 45 - 50%. Biplane (2D) method of discs ejection fraction is 40%. Grade II diastolic dysfunction. Elevated left ventricular filling pressure.    Right Ventricle: Normal right ventricular cavity size. Wall thickness is normal. Systolic function is normal.    Mitral Valve: There is mild regurgitation.    Tricuspid Valve: There is mild regurgitation.    Pulmonic Valve: There is mild regurgitation.    Pulmonary Artery: The estimated pulmonary artery systolic pressure is 37 mmHg.    IVC/SVC: Normal venous pressure at 3 mmHg.  . Continue Beta Blocker, ACE/ARB, Furosemide, and Aldactone and monitor clinical status closely. Monitor on telemetry. Patient is on CHF pathway.  Monitor strict Is&Os and daily weights.  Place on fluid restriction of 2 L. Cardiology has been consulted. Continue to stress to patient importance of self efficacy and  on diet for CHF. Last BNP reviewed- and noted below @LABRCNTIP(BNP,BNPTRIAGEBLO)@    -current medical therapy- furosemide 20 mg BID, spironolactone 25 mg, carvedilol 3.125 mg, losartan 25 mg  -DC losartan  -add Entreesto 24-26 mg  -BMP in 3 weeks   -may add SGLT2 Inhibitor if GFR improves   -weigh self daily, notify office if > 3 pound weight gain in 1 day or 5 pound gain in 1 week  -Low salt diet

## 2024-07-09 NOTE — ASSESSMENT & PLAN NOTE
-Goal A1c < 7%   -A1c 6.6 6/4/24   -followed by PCP   -continue medical  therapy- Insulin and metformin

## 2024-07-30 ENCOUNTER — TELEPHONE (OUTPATIENT)
Dept: CARDIOLOGY | Facility: CLINIC | Age: 74
End: 2024-07-30
Payer: MEDICARE

## 2024-07-30 DIAGNOSIS — I50.42 CHRONIC COMBINED SYSTOLIC AND DIASTOLIC HEART FAILURE: Primary | ICD-10-CM

## 2024-07-30 NOTE — TELEPHONE ENCOUNTER
I reached out to Ms Holder and informed Her of her results & She expressed understanding of the results.    Mayra Nunez  Medical Assistant  [facility]      ----- Message from Herman Gardiner NP sent at 7/30/2024 12:39 PM CDT -----  Please inform, Ms. Holder, your Kidney function has decreased on the recent blood work. Please stop the Entresto and continue all other medications. I will repeat lab work in 3 weeks prior to next visit. I will continue to adjust medications based on the blood work. Continue to drink water for hydration and Kidney protection. Please contact office with any questions or concerns.     Thank you,  Herman Gardiner, DNP

## 2024-07-31 ENCOUNTER — TELEPHONE (OUTPATIENT)
Dept: ELECTROPHYSIOLOGY | Facility: CLINIC | Age: 74
End: 2024-07-31
Payer: MEDICARE

## 2024-08-23 ENCOUNTER — OFFICE VISIT (OUTPATIENT)
Dept: ELECTROPHYSIOLOGY | Facility: CLINIC | Age: 74
End: 2024-08-23
Payer: MEDICARE

## 2024-08-23 ENCOUNTER — CLINICAL SUPPORT (OUTPATIENT)
Dept: CARDIOLOGY | Facility: HOSPITAL | Age: 74
End: 2024-08-23
Attending: STUDENT IN AN ORGANIZED HEALTH CARE EDUCATION/TRAINING PROGRAM
Payer: MEDICARE

## 2024-08-23 ENCOUNTER — HOSPITAL ENCOUNTER (OUTPATIENT)
Dept: CARDIOLOGY | Facility: CLINIC | Age: 74
Discharge: HOME | End: 2024-08-23
Attending: STUDENT IN AN ORGANIZED HEALTH CARE EDUCATION/TRAINING PROGRAM
Payer: MEDICARE

## 2024-08-23 ENCOUNTER — TELEPHONE (OUTPATIENT)
Dept: CARDIOLOGY | Facility: CLINIC | Age: 74
End: 2024-08-23
Payer: MEDICARE

## 2024-08-23 VITALS
DIASTOLIC BLOOD PRESSURE: 68 MMHG | SYSTOLIC BLOOD PRESSURE: 120 MMHG | BODY MASS INDEX: 37.95 KG/M2 | HEART RATE: 62 BPM | WEIGHT: 236.13 LBS | HEIGHT: 66 IN

## 2024-08-23 DIAGNOSIS — I50.42 CHRONIC COMBINED SYSTOLIC AND DIASTOLIC HEART FAILURE: ICD-10-CM

## 2024-08-23 DIAGNOSIS — R94.39 ABNORMAL NUCLEAR STRESS TEST: ICD-10-CM

## 2024-08-23 DIAGNOSIS — I48.0 PAROXYSMAL ATRIAL FIBRILLATION: ICD-10-CM

## 2024-08-23 DIAGNOSIS — E66.01 CLASS 2 SEVERE OBESITY DUE TO EXCESS CALORIES WITH SERIOUS COMORBIDITY AND BODY MASS INDEX (BMI) OF 38.0 TO 38.9 IN ADULT: ICD-10-CM

## 2024-08-23 DIAGNOSIS — I42.8 NONISCHEMIC CARDIOMYOPATHY: ICD-10-CM

## 2024-08-23 DIAGNOSIS — R00.1 SYMPTOMATIC BRADYCARDIA: ICD-10-CM

## 2024-08-23 DIAGNOSIS — I44.2 COMPLETE HEART BLOCK: ICD-10-CM

## 2024-08-23 DIAGNOSIS — G25.81 RLS (RESTLESS LEGS SYNDROME): ICD-10-CM

## 2024-08-23 DIAGNOSIS — J06.9 UPPER RESPIRATORY TRACT INFECTION, UNSPECIFIED TYPE: ICD-10-CM

## 2024-08-23 DIAGNOSIS — I44.2 COMPLETE HEART BLOCK: Primary | ICD-10-CM

## 2024-08-23 DIAGNOSIS — I50.20 HFREF (HEART FAILURE WITH REDUCED EJECTION FRACTION): ICD-10-CM

## 2024-08-23 DIAGNOSIS — K21.9 GASTROESOPHAGEAL REFLUX DISEASE, UNSPECIFIED WHETHER ESOPHAGITIS PRESENT: ICD-10-CM

## 2024-08-23 DIAGNOSIS — E03.9 HYPOTHYROIDISM, UNSPECIFIED TYPE: ICD-10-CM

## 2024-08-23 DIAGNOSIS — I10 PRIMARY HYPERTENSION: ICD-10-CM

## 2024-08-23 DIAGNOSIS — E78.2 MIXED HYPERLIPIDEMIA: ICD-10-CM

## 2024-08-23 DIAGNOSIS — J45.30 MILD PERSISTENT ASTHMA WITHOUT COMPLICATION: ICD-10-CM

## 2024-08-23 DIAGNOSIS — E11.9 TYPE 2 DIABETES MELLITUS WITHOUT COMPLICATION, WITHOUT LONG-TERM CURRENT USE OF INSULIN: ICD-10-CM

## 2024-08-23 DIAGNOSIS — Z95.0 PACEMAKER: ICD-10-CM

## 2024-08-23 LAB
OHS CV AF BURDEN PERCENT: 31
OHS CV DC REMOTE DEVICE TYPE: NORMAL
OHS CV RV PACING PERCENT: 99 %
OHS QRS DURATION: 156 MS
OHS QTC CALCULATION: 468 MS

## 2024-08-23 PROCEDURE — 3044F HG A1C LEVEL LT 7.0%: CPT | Mod: CPTII,S$GLB,, | Performed by: STUDENT IN AN ORGANIZED HEALTH CARE EDUCATION/TRAINING PROGRAM

## 2024-08-23 PROCEDURE — 3008F BODY MASS INDEX DOCD: CPT | Mod: CPTII,S$GLB,, | Performed by: STUDENT IN AN ORGANIZED HEALTH CARE EDUCATION/TRAINING PROGRAM

## 2024-08-23 PROCEDURE — 99999 PR PBB SHADOW E&M-EST. PATIENT-LVL IV: CPT | Mod: PBBFAC,,, | Performed by: STUDENT IN AN ORGANIZED HEALTH CARE EDUCATION/TRAINING PROGRAM

## 2024-08-23 PROCEDURE — 3288F FALL RISK ASSESSMENT DOCD: CPT | Mod: CPTII,S$GLB,, | Performed by: STUDENT IN AN ORGANIZED HEALTH CARE EDUCATION/TRAINING PROGRAM

## 2024-08-23 PROCEDURE — 1159F MED LIST DOCD IN RCRD: CPT | Mod: CPTII,S$GLB,, | Performed by: STUDENT IN AN ORGANIZED HEALTH CARE EDUCATION/TRAINING PROGRAM

## 2024-08-23 PROCEDURE — 93280 PM DEVICE PROGR EVAL DUAL: CPT

## 2024-08-23 PROCEDURE — 93005 ELECTROCARDIOGRAM TRACING: CPT | Mod: S$GLB,,, | Performed by: STUDENT IN AN ORGANIZED HEALTH CARE EDUCATION/TRAINING PROGRAM

## 2024-08-23 PROCEDURE — 1101F PT FALLS ASSESS-DOCD LE1/YR: CPT | Mod: CPTII,S$GLB,, | Performed by: STUDENT IN AN ORGANIZED HEALTH CARE EDUCATION/TRAINING PROGRAM

## 2024-08-23 PROCEDURE — 3074F SYST BP LT 130 MM HG: CPT | Mod: CPTII,S$GLB,, | Performed by: STUDENT IN AN ORGANIZED HEALTH CARE EDUCATION/TRAINING PROGRAM

## 2024-08-23 PROCEDURE — 93280 PM DEVICE PROGR EVAL DUAL: CPT | Mod: 26,,, | Performed by: STUDENT IN AN ORGANIZED HEALTH CARE EDUCATION/TRAINING PROGRAM

## 2024-08-23 PROCEDURE — 1126F AMNT PAIN NOTED NONE PRSNT: CPT | Mod: CPTII,S$GLB,, | Performed by: STUDENT IN AN ORGANIZED HEALTH CARE EDUCATION/TRAINING PROGRAM

## 2024-08-23 PROCEDURE — 3078F DIAST BP <80 MM HG: CPT | Mod: CPTII,S$GLB,, | Performed by: STUDENT IN AN ORGANIZED HEALTH CARE EDUCATION/TRAINING PROGRAM

## 2024-08-23 PROCEDURE — 4010F ACE/ARB THERAPY RXD/TAKEN: CPT | Mod: CPTII,S$GLB,, | Performed by: STUDENT IN AN ORGANIZED HEALTH CARE EDUCATION/TRAINING PROGRAM

## 2024-08-23 PROCEDURE — 93010 ELECTROCARDIOGRAM REPORT: CPT | Mod: S$GLB,,, | Performed by: INTERNAL MEDICINE

## 2024-08-23 PROCEDURE — 99214 OFFICE O/P EST MOD 30 MIN: CPT | Mod: S$GLB,,, | Performed by: STUDENT IN AN ORGANIZED HEALTH CARE EDUCATION/TRAINING PROGRAM

## 2024-08-23 RX ORDER — AMIODARONE HYDROCHLORIDE 200 MG/1
200 TABLET ORAL DAILY
Qty: 30 TABLET | Refills: 11 | Status: SHIPPED | OUTPATIENT
Start: 2024-08-23 | End: 2025-08-23

## 2024-08-23 RX ORDER — ASCORBIC ACID 125 MG
1 TABLET,CHEWABLE ORAL DAILY
COMMUNITY

## 2024-08-23 NOTE — TELEPHONE ENCOUNTER
I reached out to Mrs. Thibodeaux and informed HER of the orders from Herman Gardiner DNP. SHE expressed understanding of the order.    Mayra Nunez  Medical Assistant  [facility]    ----- Message from Herman Gardiner NP sent at 8/23/2024  4:18 PM CDT -----  Please inform, Ms. Holder, please continue to hold Entresto.    Thank you,  Herman Gardiner DNP

## 2024-08-23 NOTE — PROGRESS NOTES
Electrophysiology Clinic Note    Reason for follow-up patient visit: Ongoing evaluation and routine device surveillance of a dual-chamber pacemaker, implanted 6/2/2022 in the setting of complete heart block, with periods of paroxysmal atrial fibrillation.    PRESENTING HISTORY:     History of Present Illness:  Ms. Morelia Holder is a araceli 74-year-old woman who returns to clinic today for ongoing evaluation and routine device surveillance of a dual-chamber pacemaker, implanted 6/2/2022 in the setting of complete heart block, with periods of paroxysmal atrial fibrillation. She has a past medical history significant for periods of complete heart block, s/p implantation of a dual-chamber pacemaker on 6/2/2022, paroxysmal atrial fibrillation, mildly reduced systolic dysfunction with most recent LVEF 45-50% with GIIDD, hypertension, hyperlipidemia, type II diabetes mellitus, mild persistent asthma, GERD, restless leg syndrome, and obesity.     In brief review of her EP history, she is followed in general cardiology clinic with Dr. Witt and was seen following a surveillance colonoscopy that revealed a period of complete heart block. In clinic, she reported symptoms of generalized fatigue, worsened shortness of breath, and exercise intolerance. Her systolic function was preserved on echocardiogram, with her baseline ECG revealing complete heart block with incomplete RBBB. She underwent successful device implantation on 6/2/2022. Following device implantation, she was noted to have periods of paroxysmal atrial fibrillation. She was initiated on oral anticoagulation with apixaban 5mg po BID with no adverse bleeding events reported. At our previous appointments, we initiated rate control and improved antihypertensive effect with carvedilol 3.125mg po BID. She was recently admitted to [facility] from 6/3/2024 - 6/5/2024 with complaints of shortness of breath and a cough, diagnosed with an upper  "respiratory tract infection. A repeat echocardiogram at this admission revealed mildly reduced systolic dysfunction with most recent LVEF 45-50% with GIIDD, potentially in the setting of an increased burden of atrial fibrillation. On device interrogation today, her overall AT/AF burden remains elevated at 31%; likely, this may even be higher secondary to periods of undersensing. She underwent a pharmacologic nuclear cardiac stress test with a SPECT revealing a fixed inferior wall perfusion defect with no reversible ischemia. A PET test has been ordered, but has not yet been completed.     In discussion with Ms. Holder today, she tells me that she is feeling overall "better" since her recent discharge home from [facility]. She reports that her breathing has improved and that she has continued to recover from her URTI. She reports continued symptoms of mild exercise intolerance. She feels that her lower extremity edema and daily weights have remained stable on lasix 40mg po daily. She denies any episodes of dizziness, lightheadedness, syncope/presyncope, palpitations, chest pain or chest discomfort, nausea or vomiting, orthopnea, or PND. She reports that her baseline shortness of breath and dyspnea with exertion has returned to her baseline. She often naps during the day. She can climb more than one flight of stairs prior to needing to take a break, and continues to attempt to increase her level of physical activity. She recently walked around her neighborhood with her grandchildren without limitation, but reports she was tired following her walking. She reports continued back and leg pain.      Review of Systems:  Review of Systems   Constitutional:  Negative for activity change.   HENT:  Negative for nasal congestion, nosebleeds, postnasal drip, rhinorrhea, sinus pressure/congestion and sneezing.    Respiratory:  Positive for shortness of breath. Negative for apnea, cough, chest tightness and " wheezing.    Cardiovascular:  Positive for leg swelling. Negative for chest pain and palpitations.   Gastrointestinal:  Negative for abdominal distention, abdominal pain, blood in stool, change in bowel habit, constipation, diarrhea, nausea and vomiting.   Genitourinary:  Negative for dysuria and hematuria.   Musculoskeletal:  Positive for arthralgias and back pain. Negative for gait problem.   Neurological:  Negative for dizziness, seizures, syncope, weakness, light-headedness, headaches and coordination difficulties.   Psychiatric/Behavioral:  Positive for sleep disturbance.         PAST HISTORY:     Past Medical History:   Diagnosis Date    Asthma     Cardiomyopathy     CHF (congestive heart failure)     Complete heart block     Diabetes mellitus     GERD (gastroesophageal reflux disease)     Hypertension     Insomnia     Muscle spasm     Paroxysmal atrial fibrillation     Restless leg        Past Surgical History:   Procedure Laterality Date    A-V CARDIAC PACEMAKER INSERTION Bilateral 6/2/2022    Procedure: INSERTION, CARDIAC PACEMAKER, DUAL CHAMBER;  Surgeon: ESTEBAN Marie MD;  Location: [facility] EP LAB;  Service: Cardiology;  Laterality: Bilateral;  CHB, DUAL PPM, MDT, ANES, ED29    BLADDER SUSPENSION      CHOLECYSTECTOMY      COLONOSCOPY      COLONOSCOPY N/A 5/16/2022    Procedure: COLONOSCOPY;  Surgeon: Mecredes Lees MD;  Location: [facility] ENDO;  Service: Endoscopy;  Laterality: N/A;    incisional hernia repair      UPPER GASTROINTESTINAL ENDOSCOPY         Family History:  Family History   Problem Relation Name Age of Onset    Breast cancer Neg Hx      Ovarian cancer Neg Hx      Cervical cancer Neg Hx      Endometrial cancer Neg Hx      Vaginal cancer Neg Hx         Social History:  She  reports that she has never smoked. She has never been exposed to tobacco smoke. She has never used smokeless tobacco. She reports that she does not currently use drugs. She reports that she does not drink  alcohol.      MEDICATIONS & ALLERGIES:     Review of patient's allergies indicates:   Allergen Reactions    Sulfa (sulfonamide antibiotics) Nausea Only    Statins-hmg-coa reductase inhibitors     Sulfabenzamide        Current Outpatient Medications on File Prior to Visit   Medication Sig Dispense Refill    amLODIPine (NORVASC) 10 MG tablet Take 10 mg by mouth once daily.      aspirin (ECOTRIN) 81 MG EC tablet Take 1 tablet (81 mg total) by mouth once daily. 90 tablet 3    blood sugar diagnostic (CONTOUR NEXT TEST STRIPS MISC) Contour Next Test Strips      carvediloL (COREG) 3.125 MG tablet Take 1 tablet (3.125 mg total) by mouth 2 (two) times daily with meals. 60 tablet 11    colestipol (COLESTID) 1 gram Tab       DULoxetine (CYMBALTA) 20 MG capsule Take 20 mg by mouth once daily.      ELIQUIS 5 mg Tab Take 1 tablet by mouth twice daily 180 tablet 3    furosemide (LASIX) 20 MG tablet Take 1 tablet (20 mg total) by mouth 2 (two) times daily. 60 tablet 2    insulin glargine 100 units/mL (3mL) SubQ pen Inject 20 Units into the skin every evening.      lancets (MICROLET LANCET MISC) Microlet Lancet   TEST TWICE A DAY      losartan (COZAAR) 25 MG tablet Take 25 mg by mouth once daily.      metFORMIN (GLUCOPHAGE) 500 MG tablet Take 1,000 mg by mouth 2 (two) times daily with meals.       multivit-calc-iron-FA-K-hb#244 -80 mg-mcg-mcg Tab [employer]   Take one tablet by mouth every day.      sacubitriL-valsartan (ENTRESTO) 24-26 mg per tablet Take 1 tablet by mouth 2 (two) times daily. 60 tablet 6    spironolactone (ALDACTONE) 25 MG tablet Take 1 tablet (25 mg total) by mouth once daily. 30 tablet 2    vitamin B complex 100  2-herbs 100 mg Tab vitamin B complex   Take one tablet by mouth every day.      zolpidem (AMBIEN) 10 mg Tab Take 10 mg by mouth nightly as needed.       No current facility-administered medications on file prior to visit.        OBJECTIVE:     Vital Signs:  /68   Pulse 62   Ht 5'  "6" (1.676 m)   Wt 107.1 kg (236 lb 1.8 oz)   BMI 38.11 kg/m²     Physical Exam:  Physical Exam  Constitutional:       General: She is not in acute distress.     Appearance: Normal appearance. She is obese. She is not ill-appearing or diaphoretic.      Comments: Well-appearing woman in NAD.   HENT:      Head: Normocephalic and atraumatic.      Nose: Nose normal.      Mouth/Throat:      Mouth: Mucous membranes are moist.      Pharynx: Oropharynx is clear.   Eyes:      Pupils: Pupils are equal, round, and reactive to light.   Cardiovascular:      Rate and Rhythm: Normal rate and regular rhythm.      Pulses: Normal pulses.      Heart sounds: Normal heart sounds. No murmur heard.     No friction rub. No gallop.      Comments: Left anterior chest wall incision site is well-healed, with device freely mobile within the pocket.   Pulmonary:      Effort: Pulmonary effort is normal. No respiratory distress.      Breath sounds: Normal breath sounds. No wheezing, rhonchi or rales.   Chest:      Chest wall: No tenderness.   Abdominal:      General: There is no distension.      Palpations: Abdomen is soft.      Tenderness: There is no abdominal tenderness.   Musculoskeletal:         General: No swelling or tenderness.      Cervical back: Normal range of motion.      Right lower leg: Edema present.      Left lower leg: Edema present.      Comments: Bilateral 1+ lower extremity edema to the mid anterior tibial region.    Skin:     General: Skin is warm and dry.      Findings: No erythema, lesion or rash.   Neurological:      General: No focal deficit present.      Mental Status: She is alert and oriented to person, place, and time. Mental status is at baseline.      Motor: No weakness.      Gait: Gait normal.   Psychiatric:         Mood and Affect: Mood normal.         Behavior: Behavior normal.        Laboratory Data:  Lab Results   Component Value Date    WBC 8.18 06/03/2024    HGB 13.0 06/03/2024    HCT 38.3 06/03/2024    MCV " 92 06/03/2024     06/03/2024     Lab Results   Component Value Date    GLU 87 08/20/2024     08/20/2024    K 4.4 08/20/2024     08/20/2024    CO2 27 08/20/2024    BUN 25 (H) 08/20/2024    CREATININE 1.5 (H) 08/20/2024    CALCIUM 9.5 08/20/2024    MG 1.7 06/04/2024     Lab Results   Component Value Date    INR 1.0 06/02/2022       Pertinent Cardiac Data:  Personal interpretation of today's ECG: Atrially-paced, ventricularly-paced rhythm with rate of 62 bpm, AV delay 212 ms, paced  ms, QT/QTc 462/468 ms.     Resting 2D Transthoracic Echocardiogram - 6/2/2022:  The left ventricle is mildly enlarged with eccentric hypertrophy and normal systolic function. The estimated ejection fraction is 65%.  Normal right ventricular size with normal right ventricular systolic function.  Normal left ventricular diastolic function.  Mild-to-moderate mitral regurgitation.  The estimated PA systolic pressure is 33 mmHg.  Normal central venous pressure (3 mmHg).    Resting 2D Transthoracic Echocardiogram - 6/4/2024:    Left Ventricle: The left ventricle is mildly dilated. Normal wall thickness. There is eccentric hypertrophy. Regional wall motion abnormalities present. See diagram for wall motion findings. Septal motion is consistent with bundle branch block. There is mildly reduced systolic function with a visually estimated ejection fraction of 45 - 50%. Biplane (2D) method of discs ejection fraction is 40%. Grade II diastolic dysfunction. Elevated left ventricular filling pressure.    Right Ventricle: Normal right ventricular cavity size. Wall thickness is normal. Systolic function is normal.    Mitral Valve: There is mild regurgitation.    Tricuspid Valve: There is mild regurgitation.    Pulmonic Valve: There is mild regurgitation.    Pulmonary Artery: The estimated pulmonary artery systolic pressure is 37 mmHg.    IVC/SVC: Normal venous pressure at 3 mmHg.    Pharmacologic Nuclear Cardiac Stress Test -  SPECT: 6/5/2024:  FINDINGS:  There is a fixed inferior wall perfusion defect.  The stress and rest end diastolic volumes measure 119 and 127 mL respectively.  The stress and rest end systolic volumes each measure 52 mL.  The stress and rest ejection fraction measure 56 and 59% respectively.   Impression:   Fixed inferior wall perfusion defect.    Device Interrogation: Personal review of her device interrogation report ([employer] MRI, implanted 6/2/2022) reveals adequate battery longevity with an estimated 7.7 - 8.3 years of battery life remaining. Her leads were interrogated with acceptable and stable lead parameters. She is presently AP-, with underlying sinus bradycardia with complete heart block at 39 bpm. She is RA paced 65%, and RV paced >99%. She has had continued episodes of atrial fibrillation, with the longest event lasting 7 days and 11 hours on 6/21/2024. Her overall AT/AF burden is increased at 31% with excellent rate control. Possibly, this may be even higher secondary to periods of undersensing. Her atrial detection rate was decreased from 180 bpm --> 170 bpm, and her RA sensitivity was reprogrammed to 0.3mV. There are no concerning AHR or VHR episodes noted. She remains DDDR in an effort to minimize her pacing.       ASSESSMENT & PLAN:   Ms. Morelia Holder is a araceli 74-year-old woman who returns to clinic today for ongoing evaluation and routine device surveillance of a dual-chamber pacemaker, implanted 6/2/2022 in the setting of complete heart block, with periods of paroxysmal atrial fibrillation. She has a past medical history significant for periods of complete heart block, s/p implantation of a dual-chamber pacemaker on 6/2/2022, paroxysmal atrial fibrillation, mildly reduced systolic dysfunction with most recent LVEF 45-50% with GIIDD, hypertension, hyperlipidemia, type II diabetes mellitus, mild persistent asthma, GERD, restless leg syndrome, and obesity.     - She  has a normally functioning dual-chamber pacemaker on device interrogation today. She is presently AP-, with underlying sinus bradycardia with complete heart block at 39 bpm. She is RA paced 65%, and RV paced >99%.   - She has had continued episodes of atrial fibrillation, with the longest event lasting 7 days and 11 hours on 6/21/2024. Her overall AT/AF burden is increased at 31% with excellent rate control. Possibly, this AF burden may be even higher secondary to periods of undersensing. Her atrial detection rate was decreased from 180 bpm --> 170 bpm, and her RA sensitivity was reprogrammed to 0.3mV. There are no concerning AHR or VHR episodes noted. She remains DDDR in an effort to minimize her pacing.   - She was recently admitted with evidence of newly depressed systolic function, with her most recent LVEF 45-50% with GIIDD. Potentially, this decrease in her systolic function may be secondary to increased periods of atrial fibrillation. We will start antiarrhythmic therapy with low-dose amiodarone 200mg po daily.  - We will refer her into Advanced Heart Failure clinic in order to initiate guideline-directed medical therapy. She is presently on carvedilol 3.125mg po BID and spironolactone 25mg po daily. Her losartan and entresto were discontinued at her recent admission secondary to an GILSON and will need to be reintroduced once her renal function has stabilized.     - She requests referral into a new general cardiologist. This referral was placed today. She was planned to undergo further ischemic evaluation with a pharmacologic nuclear cardiac stress test with a PET scan. This testing has been ordered, but has not yet been completed.   - We discussed that her depressed systolic function may persist, despite improved control of her paroxysmal atrial fibrillation. In the event she continues to have reduced systolic function, she has an increased RV pacing burden near 100%. Her paced QRSd is wide at 156ms. She  reports symptoms of lower extremity edema, but denies any additional signs or symptoms of volume overload at this time. We discussed that she may require device upgrade to a CRT-P with implantation of a coronary sinus lead. We have reprogrammed her device today to attempt to decrease her  burden and to better sense her AF events. We discussed continued remote transmissions every three months, with her next in-office transmission in six months.  - She remains on oral anticoagulation with apixaban 5mg po BID. She denies any adverse bleeding events.     This patient will return to clinic with me in six months, with continued Advanced Heart Failure, general cardiology, and PCP appointments in the interm. All questions and concerns were addressed at this encounter. Total time spent in this patient encounter: 41 minutes.     Signing Physician:       INDER Marie MD  Electrophysiology Attending

## 2024-08-26 ENCOUNTER — TELEPHONE (OUTPATIENT)
Dept: TRANSPLANT | Facility: CLINIC | Age: 74
End: 2024-08-26
Payer: MEDICARE

## 2024-08-26 DIAGNOSIS — I50.9 CONGESTIVE HEART FAILURE, UNSPECIFIED HF CHRONICITY, UNSPECIFIED HEART FAILURE TYPE: Primary | ICD-10-CM

## 2024-08-28 PROBLEM — E78.2 MIXED HYPERLIPIDEMIA: Status: ACTIVE | Noted: 2024-08-28

## 2024-08-28 PROBLEM — I42.8 NONISCHEMIC CARDIOMYOPATHY: Status: ACTIVE | Noted: 2024-08-28

## 2024-08-28 PROBLEM — I50.20 HFREF (HEART FAILURE WITH REDUCED EJECTION FRACTION): Status: ACTIVE | Noted: 2024-08-28

## 2024-08-28 PROBLEM — R11.2 NAUSEA & VOMITING: Status: RESOLVED | Noted: 2019-03-18 | Resolved: 2024-08-28

## 2024-08-28 PROBLEM — R61 DIAPHORESIS: Status: RESOLVED | Noted: 2019-03-26 | Resolved: 2024-08-28

## 2024-08-30 ENCOUNTER — PATIENT MESSAGE (OUTPATIENT)
Dept: CARDIOLOGY | Facility: CLINIC | Age: 74
End: 2024-08-30
Payer: MEDICARE

## 2024-08-30 ENCOUNTER — TELEPHONE (OUTPATIENT)
Dept: CARDIOLOGY | Facility: CLINIC | Age: 74
End: 2024-08-30
Payer: MEDICARE

## 2024-08-30 NOTE — TELEPHONE ENCOUNTER
Appt w. Ms Hurtado cancelled due to pt needs to be seen by MD as new pt or f/u w. Previous provider Mr/ Ms Abdifatah.  Left voicemail and my chart message.

## 2024-09-05 ENCOUNTER — TELEPHONE (OUTPATIENT)
Dept: CARDIOLOGY | Facility: CLINIC | Age: 74
End: 2024-09-05
Payer: MEDICARE

## 2024-09-05 DIAGNOSIS — I21.4 NON-ST ELEVATION (NSTEMI) MYOCARDIAL INFARCTION: ICD-10-CM

## 2024-09-05 DIAGNOSIS — I48.91 ATRIAL FIBRILLATION, UNSPECIFIED TYPE: ICD-10-CM

## 2024-09-05 DIAGNOSIS — I50.42 CHRONIC COMBINED SYSTOLIC AND DIASTOLIC HEART FAILURE: Primary | ICD-10-CM

## 2024-09-05 NOTE — TELEPHONE ENCOUNTER
Appt w. Ms Correa cancelled due to pt needs to be seen by MD as new pt or f/u w. Pt rescheduled w. dr Fajardo + EKG and agreed to date/time of appointment(s).

## 2024-09-09 ENCOUNTER — CLINICAL SUPPORT (OUTPATIENT)
Dept: CARDIOLOGY | Facility: HOSPITAL | Age: 74
End: 2024-09-09
Attending: STUDENT IN AN ORGANIZED HEALTH CARE EDUCATION/TRAINING PROGRAM
Payer: MEDICARE

## 2024-09-09 DIAGNOSIS — R00.1 BRADYCARDIA, UNSPECIFIED: ICD-10-CM

## 2024-09-09 DIAGNOSIS — Z95.0 PRESENCE OF CARDIAC PACEMAKER: ICD-10-CM

## 2024-09-09 DIAGNOSIS — I44.2 ATRIOVENTRICULAR BLOCK, COMPLETE: ICD-10-CM

## 2024-09-10 LAB
OHS CV AF BURDEN PERCENT: 3
OHS CV DC REMOTE DEVICE TYPE: NORMAL
OHS CV ICD SHOCK: NO
OHS CV RV PACING PERCENT: 99 %

## 2024-09-14 ENCOUNTER — CLINICAL SUPPORT (OUTPATIENT)
Dept: CARDIOLOGY | Facility: HOSPITAL | Age: 74
End: 2024-09-14
Attending: STUDENT IN AN ORGANIZED HEALTH CARE EDUCATION/TRAINING PROGRAM
Payer: MEDICARE

## 2024-09-14 ENCOUNTER — CLINICAL SUPPORT (OUTPATIENT)
Dept: CARDIOLOGY | Facility: HOSPITAL | Age: 74
End: 2024-09-14
Payer: MEDICARE

## 2024-09-14 DIAGNOSIS — Z95.0 PRESENCE OF CARDIAC PACEMAKER: ICD-10-CM

## 2024-09-14 DIAGNOSIS — R00.1 BRADYCARDIA, UNSPECIFIED: ICD-10-CM

## 2024-09-14 DIAGNOSIS — I44.2 ATRIOVENTRICULAR BLOCK, COMPLETE: ICD-10-CM

## 2024-09-14 PROCEDURE — 93296 REM INTERROG EVL PM/IDS: CPT | Performed by: STUDENT IN AN ORGANIZED HEALTH CARE EDUCATION/TRAINING PROGRAM

## 2024-09-14 PROCEDURE — 93294 REM INTERROG EVL PM/LDLS PM: CPT | Mod: ,,, | Performed by: STUDENT IN AN ORGANIZED HEALTH CARE EDUCATION/TRAINING PROGRAM

## 2024-09-19 ENCOUNTER — OFFICE VISIT (OUTPATIENT)
Dept: CARDIOLOGY | Facility: CLINIC | Age: 74
End: 2024-09-19
Payer: MEDICARE

## 2024-09-19 ENCOUNTER — HOSPITAL ENCOUNTER (OUTPATIENT)
Dept: CARDIOLOGY | Facility: CLINIC | Age: 74
Discharge: HOME | End: 2024-09-19
Payer: MEDICARE

## 2024-09-19 VITALS
HEART RATE: 70 BPM | DIASTOLIC BLOOD PRESSURE: 70 MMHG | OXYGEN SATURATION: 97 % | WEIGHT: 238.56 LBS | HEIGHT: 66 IN | BODY MASS INDEX: 38.34 KG/M2 | SYSTOLIC BLOOD PRESSURE: 126 MMHG

## 2024-09-19 DIAGNOSIS — I48.91 ATRIAL FIBRILLATION, UNSPECIFIED TYPE: ICD-10-CM

## 2024-09-19 DIAGNOSIS — I50.42 CHRONIC COMBINED SYSTOLIC AND DIASTOLIC HEART FAILURE: ICD-10-CM

## 2024-09-19 DIAGNOSIS — I21.4 NON-ST ELEVATION (NSTEMI) MYOCARDIAL INFARCTION: ICD-10-CM

## 2024-09-19 LAB
OHS QRS DURATION: 166 MS
OHS QTC CALCULATION: 503 MS

## 2024-09-19 PROCEDURE — 3078F DIAST BP <80 MM HG: CPT | Mod: CPTII,GC,S$GLB, | Performed by: STUDENT IN AN ORGANIZED HEALTH CARE EDUCATION/TRAINING PROGRAM

## 2024-09-19 PROCEDURE — 99214 OFFICE O/P EST MOD 30 MIN: CPT | Mod: 25,GC,S$GLB, | Performed by: STUDENT IN AN ORGANIZED HEALTH CARE EDUCATION/TRAINING PROGRAM

## 2024-09-19 PROCEDURE — 3288F FALL RISK ASSESSMENT DOCD: CPT | Mod: CPTII,GC,S$GLB, | Performed by: STUDENT IN AN ORGANIZED HEALTH CARE EDUCATION/TRAINING PROGRAM

## 2024-09-19 PROCEDURE — 3008F BODY MASS INDEX DOCD: CPT | Mod: CPTII,GC,S$GLB, | Performed by: STUDENT IN AN ORGANIZED HEALTH CARE EDUCATION/TRAINING PROGRAM

## 2024-09-19 PROCEDURE — 4010F ACE/ARB THERAPY RXD/TAKEN: CPT | Mod: CPTII,GC,S$GLB, | Performed by: STUDENT IN AN ORGANIZED HEALTH CARE EDUCATION/TRAINING PROGRAM

## 2024-09-19 PROCEDURE — 1101F PT FALLS ASSESS-DOCD LE1/YR: CPT | Mod: CPTII,GC,S$GLB, | Performed by: STUDENT IN AN ORGANIZED HEALTH CARE EDUCATION/TRAINING PROGRAM

## 2024-09-19 PROCEDURE — 3074F SYST BP LT 130 MM HG: CPT | Mod: CPTII,GC,S$GLB, | Performed by: STUDENT IN AN ORGANIZED HEALTH CARE EDUCATION/TRAINING PROGRAM

## 2024-09-19 PROCEDURE — 99999 PR PBB SHADOW E&M-EST. PATIENT-LVL V: CPT | Mod: PBBFAC,GC,, | Performed by: STUDENT IN AN ORGANIZED HEALTH CARE EDUCATION/TRAINING PROGRAM

## 2024-09-19 PROCEDURE — 1126F AMNT PAIN NOTED NONE PRSNT: CPT | Mod: CPTII,GC,S$GLB, | Performed by: STUDENT IN AN ORGANIZED HEALTH CARE EDUCATION/TRAINING PROGRAM

## 2024-09-19 PROCEDURE — 3044F HG A1C LEVEL LT 7.0%: CPT | Mod: CPTII,GC,S$GLB, | Performed by: STUDENT IN AN ORGANIZED HEALTH CARE EDUCATION/TRAINING PROGRAM

## 2024-09-19 PROCEDURE — 93010 ELECTROCARDIOGRAM REPORT: CPT | Mod: S$GLB,,, | Performed by: INTERNAL MEDICINE

## 2024-09-19 RX ORDER — FUROSEMIDE 20 MG/1
20 TABLET ORAL 2 TIMES DAILY
Qty: 180 TABLET | Refills: 0 | Status: SHIPPED | OUTPATIENT
Start: 2024-09-19 | End: 2024-12-18

## 2024-09-19 RX ORDER — SPIRONOLACTONE 25 MG/1
25 TABLET ORAL DAILY
Qty: 90 TABLET | Refills: 0 | Status: SHIPPED | OUTPATIENT
Start: 2024-09-19 | End: 2024-12-18

## 2024-09-19 RX ORDER — VALSARTAN 40 MG/1
40 TABLET ORAL DAILY
Qty: 90 TABLET | Refills: 3 | Status: SHIPPED | OUTPATIENT
Start: 2024-09-19 | End: 2025-09-19

## 2024-09-27 ENCOUNTER — TELEPHONE (OUTPATIENT)
Dept: CARDIAC CATH/INVASIVE PROCEDURES | Facility: HOSPITAL | Age: 74
End: 2024-09-27
Payer: MEDICARE

## 2024-09-27 NOTE — TELEPHONE ENCOUNTER
Attempted to contact patient by phone multiple times today and left voicemail, also attempted her listed spouses number. Her kidney function and K bumped after starting valsartan. Instructed her to hold valsartan and aldactone until we can repeat labs on Monday. Will attempt to reach out again.

## 2024-09-30 LAB
OHS CV AF BURDEN PERCENT: 7
OHS CV DC REMOTE DEVICE TYPE: NORMAL
OHS CV ICD SHOCK: NO
OHS CV RV PACING PERCENT: 99 %

## 2024-10-02 DIAGNOSIS — I50.20 HFREF (HEART FAILURE WITH REDUCED EJECTION FRACTION): Primary | ICD-10-CM

## 2024-10-03 ENCOUNTER — LAB VISIT (OUTPATIENT)
Dept: LAB | Facility: HOSPITAL | Age: 74
End: 2024-10-03
Payer: MEDICARE

## 2024-10-03 ENCOUNTER — OFFICE VISIT (OUTPATIENT)
Dept: CARDIOLOGY | Facility: CLINIC | Age: 74
End: 2024-10-03
Payer: MEDICARE

## 2024-10-03 VITALS
WEIGHT: 237.63 LBS | DIASTOLIC BLOOD PRESSURE: 63 MMHG | HEART RATE: 78 BPM | SYSTOLIC BLOOD PRESSURE: 122 MMHG | BODY MASS INDEX: 38.19 KG/M2 | HEIGHT: 66 IN | OXYGEN SATURATION: 98 %

## 2024-10-03 DIAGNOSIS — Z78.9 STATIN INTOLERANCE: ICD-10-CM

## 2024-10-03 DIAGNOSIS — I44.2 COMPLETE HEART BLOCK: ICD-10-CM

## 2024-10-03 DIAGNOSIS — R94.39 ABNORMAL NUCLEAR STRESS TEST: ICD-10-CM

## 2024-10-03 DIAGNOSIS — Z95.0 PRESENCE OF CARDIAC PACEMAKER: ICD-10-CM

## 2024-10-03 DIAGNOSIS — N17.9 AKI (ACUTE KIDNEY INJURY): ICD-10-CM

## 2024-10-03 DIAGNOSIS — I50.42 CHRONIC COMBINED SYSTOLIC AND DIASTOLIC HEART FAILURE: ICD-10-CM

## 2024-10-03 DIAGNOSIS — E78.2 MIXED HYPERLIPIDEMIA: ICD-10-CM

## 2024-10-03 DIAGNOSIS — I48.0 PAROXYSMAL ATRIAL FIBRILLATION: ICD-10-CM

## 2024-10-03 DIAGNOSIS — I50.42 CHRONIC COMBINED SYSTOLIC AND DIASTOLIC HEART FAILURE: Primary | ICD-10-CM

## 2024-10-03 DIAGNOSIS — I10 PRIMARY HYPERTENSION: ICD-10-CM

## 2024-10-03 LAB
ANION GAP SERPL CALC-SCNC: 4 MMOL/L (ref 8–16)
BUN SERPL-MCNC: 37 MG/DL (ref 8–23)
CALCIUM SERPL-MCNC: 9.2 MG/DL (ref 8.7–10.5)
CHLORIDE SERPL-SCNC: 102 MMOL/L (ref 95–110)
CO2 SERPL-SCNC: 32 MMOL/L (ref 23–29)
CREAT SERPL-MCNC: 2 MG/DL (ref 0.5–1.4)
EST. GFR  (NO RACE VARIABLE): 25.7 ML/MIN/1.73 M^2
GLUCOSE SERPL-MCNC: 113 MG/DL (ref 70–110)
POTASSIUM SERPL-SCNC: 5.5 MMOL/L (ref 3.5–5.1)
SODIUM SERPL-SCNC: 138 MMOL/L (ref 136–145)

## 2024-10-03 PROCEDURE — 99999 PR PBB SHADOW E&M-EST. PATIENT-LVL IV: CPT | Mod: PBBFAC,,,

## 2024-10-03 PROCEDURE — 1101F PT FALLS ASSESS-DOCD LE1/YR: CPT | Mod: CPTII,S$GLB,,

## 2024-10-03 PROCEDURE — 4010F ACE/ARB THERAPY RXD/TAKEN: CPT | Mod: CPTII,S$GLB,,

## 2024-10-03 PROCEDURE — 1159F MED LIST DOCD IN RCRD: CPT | Mod: CPTII,S$GLB,,

## 2024-10-03 PROCEDURE — 3074F SYST BP LT 130 MM HG: CPT | Mod: CPTII,S$GLB,,

## 2024-10-03 PROCEDURE — 1126F AMNT PAIN NOTED NONE PRSNT: CPT | Mod: CPTII,S$GLB,,

## 2024-10-03 PROCEDURE — 36415 COLL VENOUS BLD VENIPUNCTURE: CPT

## 2024-10-03 PROCEDURE — 3288F FALL RISK ASSESSMENT DOCD: CPT | Mod: CPTII,S$GLB,,

## 2024-10-03 PROCEDURE — 3008F BODY MASS INDEX DOCD: CPT | Mod: CPTII,S$GLB,,

## 2024-10-03 PROCEDURE — 99214 OFFICE O/P EST MOD 30 MIN: CPT | Mod: S$GLB,,,

## 2024-10-03 PROCEDURE — 3044F HG A1C LEVEL LT 7.0%: CPT | Mod: CPTII,S$GLB,,

## 2024-10-03 PROCEDURE — 3078F DIAST BP <80 MM HG: CPT | Mod: CPTII,S$GLB,,

## 2024-10-03 PROCEDURE — 80048 BASIC METABOLIC PNL TOTAL CA: CPT

## 2024-10-03 NOTE — PROGRESS NOTES
General Cardiology Clinic Note  Last Clinic Visit: 9/19/24 with Dr. Barber    General Cardiologist: Dr. Barber    HPI:     Morelia Holder is a 74 y.o. , who presents for CHF follow-up.    PROBLEM LIST:  CHB   - s/p dual chamber PPM 6/2022  pAF  HFmrEF (EF mid-40s)  HTN  HLD  - statin intolerant  DM  Asthma    Interval HPI:   After her last visit with Dr. Barber, losartan was switched to valsartan. Unfortunately on a subsequent BMP, her kidney function declined and potassium was elevated. On 9/28, she was instructed to hold valsartan and aldactone, but she misunderstood and only held valsartan. Yesterday, this was clarified with her and she was also instructed to hold lasix as well. Her repeat BMP yesterday was essentially unchanged. Dr. Barber discussed sending her to the ED for evaluation, however the patient declined and preferred to have a next-day urgent clinic visit, for which she presents today.    Today, she tells me she feels at her baseline. She got a little short of breath walking from the parking lot to our clinic, but this is typical for her over the past few months. She otherwise denies chest pain/pressure/tightness/discomfort, sustained palpitations, PND/orthopnea, edema, lightheadedness or syncope. Her weight is stable. She continues to hold off on valsartan, lasix, and spironolactone.      9/2024 HPI (Dr. Barber)  Ms. Morelia Holder is a 74 y.o. female with PMH of CHB s/p dual chamber PPM 6/2022, pAF, HFmrEF, HTN, HLD, DM, asthma and obesity who presents for follow up. Patient admitted in June for respiratory infection vs ADHF. Diuresed and treated with abx. Found to have newly reduced EF in mid 40s. Also, found to have minimally elevated troponin with no ischemia SPECT and no chest pain, likely demand related. Presents today for follow up. She reports she is back to her baseline. Denies worsening MENDEZ, LE edema, chest discomfort, palpitations, lightheadedness. Overall, she reports she is  "feeling well.     7/2024 HPI (Herman Gardiner, ENEIDA)  Patient is a 75 yo F w/ PMH of  combined HF (LVEF 40% w GIIDD 6/4/24), CHB, S/P PPM 6/20/22, PAF on apixaban, HTN, HLD, asthma, GERD, DM2, and obesity who presents today for hospital follow up. She was previously followed by Cardiology and was last seen by Dr. Witt on 6/2/2022 for CHB and was sent to ED for admission for PPM placement. She presented to ED on 6/3/24 w SOB, cough, BLE edema. She also notes fatigue and MENDEZ with household activities. CXR- pulmonary edema, Cardiac echo revealed LVEF  40%. Grade II diastolic dysfunction. Elevated left ventricular filling pressure. Regional wall motion abnormalities. Troponin mildly elevated. NM stress test completed and revealed Fixed inferior wall perfusion defect. She was discharged home on medical therapy, HF management- furosemide 20 mg BID, spironolactone 25 mg, carvedilol 3.125 mg, losartan 25 mg, HTN management- amlodipine 10 mg, and apixaban 5 mg  w Cardiology follow up.   She is followed by EP and was last seen by Dr. Marie on 11/13/2023  for a history of complete heart block s/p implantation of a dual-chamber pacemaker on 6/2/2022.  She noted, "she has preserved systolic function, LVEF 65%, although she has an increased RV pacing near 100% pacing burden. Her paced QRSd is wide at 158ms. She reports symptoms of lower extremity edema, but denies any additional signs or symptoms of volume overload. We discussed that she may require device upgrade to a CRT-P with implantation of a coronary sinus lead. In the event her systolic function decreases, or in the event she develops heart failure, we will revisit the topic of device upgrade. We have reprogrammed her device today to attempt to decrease her  burden."    Since discharge reports doing well, but notes some fatigue with activity. She also notes heat sensitivity.   Patient denies CP, SOB, palpitations, orthopnea, PND, presyncope, LOC, swelling, or " claudication.   Patient does not monitor her home BP, notes her machine is not working.    Patient reports medication compliance without side effects  Patient does not exercise regularly.    6/2022 HPI (Dr. Witt)  Pt is a 71 yo F w/ PMH of DM2, GERD, and Class 2 Obesity w/ BMI 37.4 who presents w/ and abnormal ECG.  She presented for an colonoscopy 5/16/2022 and had an ECG which noted a 3rd degree AVB w/ narrow complex.  Pt was asymptomatic and hemodynamically stable.  She had her procedure and was discharged home w/ cardiology f/u.  She mentions that she has been having dodge x6 months and notes decline in her functional status.  She also has bipedal edema at times, palpitations w/ exertion, and presyncope.  She denies cp, orthopnea, PND, LOC, and claudication.  She notes compliance w/ meds and denies side effects.  She does not monitor her BP at home.  She does not exercise and she notes a decline in her exercise tolerance       Surgical: Reviewed, as below.  Family: Reviewed, as below.   Social: Reviewed, as below.    ROS:    Pertinent ROS included in HPI and below.  PMH:     Past Medical History:   Diagnosis Date    Asthma     Cardiomyopathy     CHF (congestive heart failure)     Complete heart block     Diabetes mellitus     GERD (gastroesophageal reflux disease)     Hypertension     Insomnia     Muscle spasm     Paroxysmal atrial fibrillation     Restless leg      Past Surgical History:   Procedure Laterality Date    A-V CARDIAC PACEMAKER INSERTION Bilateral 6/2/2022    Procedure: INSERTION, CARDIAC PACEMAKER, DUAL CHAMBER;  Surgeon: ESTEBAN Marie MD;  Location: [facility] EP LAB;  Service: Cardiology;  Laterality: Bilateral;  CHB, DUAL PPM, MDT, ANES, ED29    BLADDER SUSPENSION      CHOLECYSTECTOMY      COLONOSCOPY      COLONOSCOPY N/A 5/16/2022    Procedure: COLONOSCOPY;  Surgeon: Mercedes Lees MD;  Location: [facility] ENDO;  Service: Endoscopy;  Laterality: N/A;    incisional hernia repair      UPPER  GASTROINTESTINAL ENDOSCOPY       Allergies:     Review of patient's allergies indicates:   Allergen Reactions    Sulfa (sulfonamide antibiotics) Nausea Only    Statins-hmg-coa reductase inhibitors     Sulfabenzamide      Medications:     Current Outpatient Medications on File Prior to Visit   Medication Sig Dispense Refill    amiodarone (PACERONE) 200 MG Tab Take 1 tablet (200 mg total) by mouth once daily. 30 tablet 11    amLODIPine (NORVASC) 10 MG tablet Take 10 mg by mouth once daily.      aspirin (ECOTRIN) 81 MG EC tablet Take 1 tablet (81 mg total) by mouth once daily. 90 tablet 3    blood sugar diagnostic (CONTOUR NEXT TEST STRIPS MISC) Contour Next Test Strips      carvediloL (COREG) 3.125 MG tablet Take 1 tablet (3.125 mg total) by mouth 2 (two) times daily with meals. 60 tablet 11    colestipol (COLESTID) 1 gram Tab       DULoxetine (CYMBALTA) 20 MG capsule Take 20 mg by mouth once daily.      ELIQUIS 5 mg Tab Take 1 tablet by mouth twice daily 180 tablet 3    furosemide (LASIX) 20 MG tablet Take 1 tablet (20 mg total) by mouth 2 (two) times daily. 180 tablet 0    insulin glargine 100 units/mL (3mL) SubQ pen Inject 20 Units into the skin every evening.      lancets (MICROLET LANCET MISC) Microlet Lancet   TEST TWICE A DAY      metFORMIN (GLUCOPHAGE) 500 MG tablet Take 1,000 mg by mouth 2 (two) times daily with meals.       sq-um-xg-iron-folic-K-herb 293 (ALIVE WOMEN'S ENERGY) 18 mg iron- 240 mcg-120 mcg Tab Take 1 tablet by mouth once daily.      spironolactone (ALDACTONE) 25 MG tablet Take 1 tablet (25 mg total) by mouth once daily. 90 tablet 0    valsartan (DIOVAN) 40 MG tablet Take 1 tablet (40 mg total) by mouth once daily. 90 tablet 3    zolpidem (AMBIEN) 10 mg Tab Take 10 mg by mouth nightly as needed.      vitamin B complex 100  2-herbs 100 mg Tab vitamin B complex   Take one tablet by mouth every day.       No current facility-administered medications on file prior to visit.     Social History:  "    Social History     Tobacco Use    Smoking status: Never     Passive exposure: Never    Smokeless tobacco: Never   Substance Use Topics    Alcohol use: No     Family History:     Family History   Problem Relation Name Age of Onset    Breast cancer Neg Hx      Ovarian cancer Neg Hx      Cervical cancer Neg Hx      Endometrial cancer Neg Hx      Vaginal cancer Neg Hx       Physical Exam:   /63 (Patient Position: Sitting)   Pulse 78   Ht 5' 6" (1.676 m)   Wt 107.8 kg (237 lb 10.5 oz)   SpO2 98%   BMI 38.36 kg/m²      Physical Exam  Constitutional:       Appearance: Normal appearance.   Neck:      Vascular: No carotid bruit, hepatojugular reflux or JVD.   Cardiovascular:      Rate and Rhythm: Normal rate and regular rhythm.      Pulses:           Carotid pulses are 2+ on the right side and 2+ on the left side.       Radial pulses are 2+ on the right side and 2+ on the left side.        Dorsalis pedis pulses are 2+ on the right side and 2+ on the left side.      Heart sounds: Normal heart sounds.   Pulmonary:      Effort: Pulmonary effort is normal.      Breath sounds: Normal breath sounds.   Musculoskeletal:      Right lower leg: No edema.      Left lower leg: No edema.   Skin:     General: Skin is warm and dry.   Neurological:      Mental Status: She is alert.          Labs:     Blood Tests:  Lab Results   Component Value Date     (H) 06/03/2024     10/02/2024    K 5.3 (H) 10/02/2024     10/02/2024    CO2 29 10/02/2024    BUN 37 (H) 10/02/2024    CREATININE 2.1 (H) 10/02/2024     (H) 10/02/2024    HGBA1C 6.6 (H) 06/04/2024    MG 1.7 06/04/2024    AST 18 06/03/2024    ALT 14 06/03/2024    ALBUMIN 3.8 06/03/2024    PROT 6.9 06/03/2024    BILITOT 0.7 06/03/2024    WBC 8.18 06/03/2024    HGB 13.0 06/03/2024    HCT 38.3 06/03/2024    MCV 92 06/03/2024     06/03/2024    INR 1.0 06/02/2022    TSH 2.261 06/04/2024       Lab Results   Component Value Date    CHOL 202 (H) 06/04/2024 "    HDL 48 2024    TRIG 91 2024       Lab Results   Component Value Date    LDLCALC 135.8 2024       Lab Results   Component Value Date    TSH 2.261 2024       Lab Results   Component Value Date    HGBA1C 6.6 (H) 2024         Imaging:     Echocardiogram  TTE 2024    Left Ventricle: The left ventricle is mildly dilated. Normal wall thickness. There is eccentric hypertrophy. Regional wall motion abnormalities present. See diagram for wall motion findings. Septal motion is consistent with bundle branch block. There is mildly reduced systolic function with a visually estimated ejection fraction of 45 - 50%. Biplane (2D) method of discs ejection fraction is 40%. Grade II diastolic dysfunction. Elevated left ventricular filling pressure.    Right Ventricle: Normal right ventricular cavity size. Wall thickness is normal. Systolic function is normal.    Mitral Valve: There is mild regurgitation.    Tricuspid Valve: There is mild regurgitation.    Pulmonic Valve: There is mild regurgitation.    Pulmonary Artery: The estimated pulmonary artery systolic pressure is 37 mmHg.    IVC/SVC: Normal venous pressure at 3 mmHg.    Stress testing  SPECT 2024    The ECG portion of the study is uninterpretable due to ventricular paced rhythm.    The patient reported no chest pain during the stress test.    There were no arrhythmias during stress.    There is a fixed inferior wall perfusion defect. The stress and rest end diastolic volumes measure 119 and 127 mL respectively. The stress and rest end systolic volumes each measure 52 mL. The stress and rest ejection fraction measure 56 and 59% respectively.     Cath Lab  None    Other  None    EK24 - Ventricular-paced rhythm, 70 bpm   Underlying rhythm appears to be atrial flutter   Abnormal ECG     Assessment:     1. Chronic combined systolic and diastolic heart failure    2. Abnormal nuclear stress test    3. Complete heart block    4. Presence of  cardiac pacemaker    5. Paroxysmal atrial fibrillation    6. Primary hypertension    7. Mixed hyperlipidemia    8. Statin intolerance    9. GILSON (acute kidney injury)        Plan:     Chronic combined systolic and diastolic heart failure  Abnormal nuclear stress test  EF reported 40-50% on TTE in 6/2024.   Recent SPECT with no ischemia, but did show possible inferior scar. (may also be due to diaphragm artifact)   Drop in EF possibly due to acute illness.   Plan is to repeat echo after GDMT optimization, and if no improvement will consider PET. Updated TTE pending 10/15/24.  GDMT:      - Currently holding valsartan, lasix, and aldactone due to GILSON.      - Kidney function also previously decreased on entresto. She had been on losartan 25mg for years prior to this with normal kidney function. Could consider re-introducing this at a later time.      - SGLT2i would ideally be added next, particularly for renoprotection, once kidney function recovers.      - Can also plan to increase coreg as BP tolerates (pt has PPM).    Today, she is euvolemic on exam, denies sx of fluid overload, and remains NYHA Class I-II symptom-wise, which is her baseline.  Will obtain BMP today to ensure improvement of kidney function now that she is holding aldactone, lasix, and valsartan.  If worsening, will send to ED.  If improving, will plan to re-check in 1 week to ensure further resolution. Can then plan to adjust BB, add SGLT2i, and/or resume previously tolerated dose of losartan, as above.  I have also encouraged the patient stay well-hydrated.    Complete heart block  Presence of cardiac pacemaker  Paroxysmal atrial fibrillation  Follows with Dr. Marie. Continue current medication regimen as prescribed by EP.    Primary hypertension  BP well-controlled. Continue current medication regimen. Encourage low-sodium diet.    Mixed hyperlipidemia  Statin intolerance  Above goal. History of statin allergy. Discussed starting zetia, but she  would like to defer to a later visit.   Cholesterol education provided. Diet/exercise goals reinforced.         Signed:  Lauren Vlosich, PA-C [facility]     10/3/2024     Follow-up:     Future Appointments   Date Time Provider Department [place]   10/3/2024 10:15 AM LAB, APPOINTMENT [facility] LAB [facility]   10/15/2024 11:00 AM ECHO, [facility] ECHOSTR [facility]   10/15/2024 12:05 PM LAB, APPOINTMENT [facility] LAB [facility]   10/15/2024  1:00 PM Randy Maldonado MD [facility] HEARTTX [facility]   12/16/2024 10:30 AM Bailey Hurtado PA-C [facility] CARDIO [facility]

## 2024-10-03 NOTE — PROGRESS NOTES
Subjective     Chief Complaint: follow up    History of Present Illness:  Ms. Morelia Holder is a 74 y.o. female with PMH of CHB s/p dual chamber PPM 6/2022, pAF, HFmrEF, HTN, HLD, DM, asthma and obesity who presents for follow up. Patient admitted in June for respiratory infection vs ADHF. Diuresed and treated with abx. Found to have newly reduced EF in mid 40s. Also, found to have minimally elevated troponin with no ischemia SPECT and no chest pain, likely demand related. Presents today for follow up. She reports she is back to her baseline. Denies worsening MENDEZ, LE edema, chest discomfort, palpitations, lightheadedness. Overall, she reports she is feeling well.     PAST HISTORY:     Past Medical History:   Diagnosis Date    Asthma     Cardiomyopathy     CHF (congestive heart failure)     Complete heart block     Diabetes mellitus     GERD (gastroesophageal reflux disease)     Hypertension     Insomnia     Muscle spasm     Paroxysmal atrial fibrillation     Restless leg        Cardiac History   Results for orders placed during the hospital encounter of 06/03/24    Echo Saline Bubble? No; Ultrasound enhancing contrast? Yes    Interpretation Summary    Left Ventricle: The left ventricle is mildly dilated. Normal wall thickness. There is eccentric hypertrophy. Regional wall motion abnormalities present. See diagram for wall motion findings. Septal motion is consistent with bundle branch block. There is mildly reduced systolic function with a visually estimated ejection fraction of 45 - 50%. Biplane (2D) method of discs ejection fraction is 40%. Grade II diastolic dysfunction. Elevated left ventricular filling pressure.    Right Ventricle: Normal right ventricular cavity size. Wall thickness is normal. Systolic function is normal.    Mitral Valve: There is mild regurgitation.    Tricuspid Valve: There is mild regurgitation.    Pulmonic Valve: There is mild regurgitation.    Pulmonary Artery: The estimated  pulmonary artery systolic pressure is 37 mmHg.    IVC/SVC: Normal venous pressure at 3 mmHg.    EF   Date Value Ref Range Status   06/02/2022 65 % Final     Results for orders placed during the hospital encounter of 06/03/24    Nuclear Stress Test    Interpretation Summary    The ECG portion of the study is uninterpretable due to ventricular paced rhythm.    The patient reported no chest pain during the stress test.    There were no arrhythmias during stress.    The nuclear portion of this study will be reported separately.      MEDICATIONS:     Current Outpatient Medications on File Prior to Visit   Medication Sig    amiodarone (PACERONE) 200 MG Tab Take 1 tablet (200 mg total) by mouth once daily.    amLODIPine (NORVASC) 10 MG tablet Take 10 mg by mouth once daily.    aspirin (ECOTRIN) 81 MG EC tablet Take 1 tablet (81 mg total) by mouth once daily.    blood sugar diagnostic (CONTOUR NEXT TEST STRIPS MISC) Contour Next Test Strips    carvediloL (COREG) 3.125 MG tablet Take 1 tablet (3.125 mg total) by mouth 2 (two) times daily with meals.    colestipol (COLESTID) 1 gram Tab     DULoxetine (CYMBALTA) 20 MG capsule Take 20 mg by mouth once daily.    ELIQUIS 5 mg Tab Take 1 tablet by mouth twice daily    insulin glargine 100 units/mL (3mL) SubQ pen Inject 20 Units into the skin every evening.    lancets (MICROLET LANCET MISC) Microlet Lancet   TEST TWICE A DAY    metFORMIN (GLUCOPHAGE) 500 MG tablet Take 1,000 mg by mouth 2 (two) times daily with meals.     ih-xe-qk-iron-folic-K-herb 293 (ALIVE WOMEN'S ENERGY) 18 mg iron- 240 mcg-120 mcg Tab Take 1 tablet by mouth once daily.    zolpidem (AMBIEN) 10 mg Tab Take 10 mg by mouth nightly as needed.    losartan (COZAAR) 25 MG tablet Take 25 mg by mouth once daily. (Patient not taking: Reported on 8/23/2024)    sacubitriL-valsartan (ENTRESTO) 24-26 mg per tablet Take 1 tablet by mouth 2 (two) times daily. (Patient not taking: Reported on 8/23/2024)    vitamin B complex 100  " 2-herbs 100 mg Tab vitamin B complex   Take one tablet by mouth every day.     No current facility-administered medications on file prior to visit.       SUBJECTIVE:     Review of Systems   Respiratory:  Negative for shortness of breath.    Cardiovascular:  Negative for chest pain, palpitations, orthopnea, claudication and leg swelling.        OBJECTIVE:     Vital Signs:  Vitals:    09/19/24 1615   BP: 126/70   Pulse: 70   SpO2: 97%   Weight: 108.2 kg (238 lb 8.6 oz)   Height: 5' 6" (1.676 m)     Body mass index is 38.5 kg/m².     Physical Exam  Constitutional:       Appearance: She is obese.   HENT:      Head: Normocephalic and atraumatic.   Eyes:      Conjunctiva/sclera: Conjunctivae normal.   Neck:      Comments: No JVD at 30 degrees  Cardiovascular:      Rate and Rhythm: Normal rate and regular rhythm.      Heart sounds: Normal heart sounds.   Pulmonary:      Effort: Pulmonary effort is normal. No respiratory distress.      Breath sounds: Normal breath sounds. No wheezing.   Abdominal:      General: There is no distension.      Palpations: Abdomen is soft.      Tenderness: There is no abdominal tenderness.   Musculoskeletal:      Cervical back: Normal range of motion.      Right lower leg: No edema.      Left lower leg: No edema.   Neurological:      Mental Status: She is alert and oriented to person, place, and time.   Psychiatric:         Mood and Affect: Affect normal.         Laboratory  Lab Results   Component Value Date    WBC 8.18 06/03/2024    HGB 13.0 06/03/2024    HCT 38.3 06/03/2024    MCV 92 06/03/2024     06/03/2024     BMP  Lab Results   Component Value Date     10/02/2024    K 5.3 (H) 10/02/2024     10/02/2024    CO2 29 10/02/2024    BUN 37 (H) 10/02/2024    CREATININE 2.1 (H) 10/02/2024    CALCIUM 9.5 10/02/2024    ANIONGAP 8 10/02/2024    EGFRNORACEVR 24.3 (A) 10/02/2024     Lab Results   Component Value Date    INR 1.0 06/02/2022     Lab Results   Component Value Date    " "HGBA1C 6.6 (H) 06/04/2024     No results for input(s): "POCTGLUCOSE" in the last 72 hours.    Diagnostic Results:  Labs: Reviewed  Echo: Reviewed  SPECT    ASSESSMENT & PLAN:     HFmrEF  EF reported 40-50 on recent TTE  Euvolemic and asymptomatic today   Given she appears to be borderline HFrEF, will change losartan to entresto, continue aldactone, continue coreg. Will check BMP in 1 week. SGLT2 will need to be added next  Recent SPECT with no ischemia but did show possible inferior scar but this may also be due to diaphragm artifact, no chest pain. Drop in EF possibly due to acute illness. Will repeat echo after GDMT optimization, if no improvement will consider PET  Continue lasix 20 BID    AF  Follows with Dr. Marie  Continue apixaban and BB. Amiodarone per EP recs    HTN  Controlled  Continue above meds    HLD  Lab Results   Component Value Date    LDLCALC 135.8 06/04/2024   Uncontrolled  History of statin allergy  Discussed need to start Zetia however she prefers to address this next visit   Diet and exercise encouraged       Discussed with Dr. Kidd  - staff attestation to follow      Bill Barber MD  Cardiovascular Disease Fellow PGY-6      "

## 2024-10-03 NOTE — PROGRESS NOTES
Called patient multiple times since Friday with no answer. Nursing staff was able to contact her finally today. Her repeat Cr and K remains unchanged. She is feeling at her baseline. Instructed to stop valsartan and aldactone. I discussed sending her to ED for evaluation; however, she would prefer and urgent appt in clinic tomorrow morning for evaluation. Discussed in detail that she may need be admitted if her Cr does not improve or worsen.

## 2024-10-04 ENCOUNTER — PATIENT MESSAGE (OUTPATIENT)
Dept: CARDIOLOGY | Facility: CLINIC | Age: 74
End: 2024-10-04
Payer: MEDICARE

## 2024-10-04 NOTE — PROGRESS NOTES
Sending zirconium Rx. Instructed to take 1 dose and will repeat BMP. If K does not improve instructed her to present to ED. Patient verbalized understanding

## 2024-10-07 ENCOUNTER — HOSPITAL ENCOUNTER (EMERGENCY)
Facility: HOSPITAL | Age: 74
Discharge: HOME | End: 2024-10-07
Attending: EMERGENCY MEDICINE
Payer: MEDICARE

## 2024-10-07 VITALS
OXYGEN SATURATION: 95 % | RESPIRATION RATE: 16 BRPM | HEART RATE: 70 BPM | HEIGHT: 66 IN | TEMPERATURE: 98 F | BODY MASS INDEX: 37.77 KG/M2 | SYSTOLIC BLOOD PRESSURE: 122 MMHG | DIASTOLIC BLOOD PRESSURE: 75 MMHG | WEIGHT: 235 LBS

## 2024-10-07 DIAGNOSIS — R79.9 ABNORMAL BLOOD CHEMISTRY LEVEL: Primary | ICD-10-CM

## 2024-10-07 DIAGNOSIS — E87.5 HYPERKALEMIA: ICD-10-CM

## 2024-10-07 LAB
ALBUMIN SERPL BCP-MCNC: 3.7 G/DL (ref 3.5–5.2)
ALP SERPL-CCNC: 43 U/L (ref 55–135)
ALT SERPL W/O P-5'-P-CCNC: 12 U/L (ref 10–44)
ANION GAP SERPL CALC-SCNC: 8 MMOL/L (ref 8–16)
AST SERPL-CCNC: 15 U/L (ref 10–40)
BASOPHILS # BLD AUTO: 0.04 K/UL (ref 0–0.2)
BASOPHILS NFR BLD: 0.6 % (ref 0–1.9)
BILIRUB SERPL-MCNC: 0.5 MG/DL (ref 0.1–1)
BNP SERPL-MCNC: 95 PG/ML (ref 0–99)
BUN SERPL-MCNC: 30 MG/DL (ref 8–23)
BUN SERPL-MCNC: 31 MG/DL (ref 6–30)
CALCIUM SERPL-MCNC: 9.4 MG/DL (ref 8.7–10.5)
CHLORIDE SERPL-SCNC: 105 MMOL/L (ref 95–110)
CHLORIDE SERPL-SCNC: 110 MMOL/L (ref 95–110)
CO2 SERPL-SCNC: 21 MMOL/L (ref 23–29)
CREAT SERPL-MCNC: 1.8 MG/DL (ref 0.5–1.4)
CREAT SERPL-MCNC: 1.9 MG/DL (ref 0.5–1.4)
DIFFERENTIAL METHOD BLD: ABNORMAL
EOSINOPHIL # BLD AUTO: 0.2 K/UL (ref 0–0.5)
EOSINOPHIL NFR BLD: 3.6 % (ref 0–8)
ERYTHROCYTE [DISTWIDTH] IN BLOOD BY AUTOMATED COUNT: 12.8 % (ref 11.5–14.5)
EST. GFR  (NO RACE VARIABLE): 29.2 ML/MIN/1.73 M^2
GLUCOSE SERPL-MCNC: 128 MG/DL (ref 70–110)
GLUCOSE SERPL-MCNC: 129 MG/DL (ref 70–110)
HCT VFR BLD AUTO: 38.9 % (ref 37–48.5)
HCT VFR BLD CALC: 39 %PCV (ref 36–54)
HGB BLD-MCNC: 12.5 G/DL (ref 12–16)
IMM GRANULOCYTES # BLD AUTO: 0.03 K/UL (ref 0–0.04)
IMM GRANULOCYTES NFR BLD AUTO: 0.5 % (ref 0–0.5)
LYMPHOCYTES # BLD AUTO: 1 K/UL (ref 1–4.8)
LYMPHOCYTES NFR BLD: 16.2 % (ref 18–48)
MAGNESIUM SERPL-MCNC: 2.1 MG/DL (ref 1.6–2.6)
MCH RBC QN AUTO: 30.7 PG (ref 27–31)
MCHC RBC AUTO-ENTMCNC: 32.1 G/DL (ref 32–36)
MCV RBC AUTO: 96 FL (ref 82–98)
MONOCYTES # BLD AUTO: 0.5 K/UL (ref 0.3–1)
MONOCYTES NFR BLD: 8.4 % (ref 4–15)
NEUTROPHILS # BLD AUTO: 4.4 K/UL (ref 1.8–7.7)
NEUTROPHILS NFR BLD: 70.7 % (ref 38–73)
NRBC BLD-RTO: 0 /100 WBC
OHS QRS DURATION: 168 MS
OHS QTC CALCULATION: 498 MS
PLATELET # BLD AUTO: 196 K/UL (ref 150–450)
PMV BLD AUTO: 11.1 FL (ref 9.2–12.9)
POC IONIZED CALCIUM: 1.14 MMOL/L (ref 1.06–1.42)
POC TCO2 (MEASURED): 24 MMOL/L (ref 23–29)
POTASSIUM BLD-SCNC: 5 MMOL/L (ref 3.5–5.1)
POTASSIUM SERPL-SCNC: 5 MMOL/L (ref 3.5–5.1)
PROT SERPL-MCNC: 7.1 G/DL (ref 6–8.4)
RBC # BLD AUTO: 4.07 M/UL (ref 4–5.4)
SAMPLE: ABNORMAL
SODIUM BLD-SCNC: 138 MMOL/L (ref 136–145)
SODIUM SERPL-SCNC: 139 MMOL/L (ref 136–145)
WBC # BLD AUTO: 6.18 K/UL (ref 3.9–12.7)

## 2024-10-07 PROCEDURE — 80053 COMPREHEN METABOLIC PANEL: CPT | Performed by: EMERGENCY MEDICINE

## 2024-10-07 PROCEDURE — 82308 ASSAY OF CALCITONIN: CPT

## 2024-10-07 PROCEDURE — 80047 BASIC METABLC PNL IONIZED CA: CPT

## 2024-10-07 PROCEDURE — 83880 ASSAY OF NATRIURETIC PEPTIDE: CPT | Performed by: EMERGENCY MEDICINE

## 2024-10-07 PROCEDURE — 99284 EMERGENCY DEPT VISIT MOD MDM: CPT | Mod: 25

## 2024-10-07 PROCEDURE — 83735 ASSAY OF MAGNESIUM: CPT | Performed by: EMERGENCY MEDICINE

## 2024-10-07 PROCEDURE — 85025 COMPLETE CBC W/AUTO DIFF WBC: CPT | Performed by: EMERGENCY MEDICINE

## 2024-10-07 PROCEDURE — 93005 ELECTROCARDIOGRAM TRACING: CPT

## 2024-10-07 PROCEDURE — 93010 ELECTROCARDIOGRAM REPORT: CPT | Mod: ,,, | Performed by: INTERNAL MEDICINE

## 2024-10-07 NOTE — ED PROVIDER NOTES
Source of History:  The patient    Chief complaint:  abnormal labs (Pt sent from cardiologist clinic for abnormal labs. Denies chest pain, SOB. Hx CHF. Denies stents.)      HPI:  Morelia Holder is a 74 y.o. female  with a history of congestive heart failure pacemaker and chronic kidney disease sent by her cardiologist for abnormal lab results including elevated potassium and kidney function.  Outpatient labs over the last couple of weeks shows showed elevated potassium.  Last blood draw was 4 days ago in which she had an elevated potassium.  She was told to stop Lasix as well as spironolactone.  Told by cardiologist to come to the emergency department today for repeat blood work.      Patient denies any symptoms such as chest pain, swelling or shortness of breath.    This is the extent to the patients complaints today here in the emergency department.    ROS:   See HPI.    Review of patient's allergies indicates:   Allergen Reactions    Sulfa (sulfonamide antibiotics) Nausea Only    Statins-hmg-coa reductase inhibitors     Sulfabenzamide        PMH:  As per HPI and below:  Past Medical History:   Diagnosis Date    Asthma     Cardiomyopathy     CHF (congestive heart failure)     Complete heart block     Diabetes mellitus     GERD (gastroesophageal reflux disease)     Hypertension     Insomnia     Muscle spasm     Paroxysmal atrial fibrillation     Restless leg      Past Surgical History:   Procedure Laterality Date    A-V CARDIAC PACEMAKER INSERTION Bilateral 6/2/2022    Procedure: INSERTION, CARDIAC PACEMAKER, DUAL CHAMBER;  Surgeon: ESTEBAN Marie MD;  Location: [facility] EP LAB;  Service: Cardiology;  Laterality: Bilateral;  CHB, DUAL PPM, MDT, ANES, ED29    BLADDER SUSPENSION      CHOLECYSTECTOMY      COLONOSCOPY      COLONOSCOPY N/A 5/16/2022    Procedure: COLONOSCOPY;  Surgeon: Mercedes Lees MD;  Location: [facility] ENDO;  Service: Endoscopy;  Laterality: N/A;    incisional hernia repair      UPPER  "GASTROINTESTINAL ENDOSCOPY         Social History     Tobacco Use    Smoking status: Never     Passive exposure: Never    Smokeless tobacco: Never   Substance Use Topics    Alcohol use: No    Drug use: Not Currently       Physical Exam:    /75 (Patient Position: Sitting)   Pulse 70   Temp 98.1 °F (36.7 °C) (Oral)   Resp 16   Ht 5' 6" (1.676 m)   Wt 106.6 kg (235 lb)   SpO2 95%   BMI 37.93 kg/m²   Nursing note and vital signs reviewed.  Constitutional: No acute distress.  Nontoxic  Eyes:  Conjunctiva normal.  Extraocular muscles are intact.  Cardiovascular: Regular rate and rhythm.  No murmurs. No gallops. No rubs.  No edema lower extremities bilaterally noted.  Respiratory: Clear to auscultation bilaterally.  Good air movement.  No wheezes.  No rhonchi. No rales. No accessory muscle use..  Abdomen: Soft.  Not distended.  Nontender.  No guarding.  No rebound. Non-peritoneal.  Neuro: alert. At baseline.  No focal neurological deficits.  MSK: No deformities.      Summary of Previous Medical Records:  Reviewing the patient's labs which were 4 days ago, 5 days ago in 11 days ago has a creatinine of 2.0 to 2.1.  Potassium over these 3 blood draws has been between 5.3-5.5.  Looking at previous blood draws prior to this, blood draws 1 month and 2 months ago have a creatinine of 1.5.  4 months ago creatinine was 1.1.    Reviewed office visit with Cardiology on 10/03/2024.  Reportedly after her visit in September 2024 losartan was switch to valsartan.  Subsequent BNP did not show an improvement in her kidney function and potassium was elevated.  On September 28th she was instructed to hold valsartan and Aldactone but misunderstood and reported only holding valsartan.  She was seen the day before this visit and told due stopped the Aldactone as well as the Lasix and had a repeat BNP which was unchanged.  Recommended by Cardiology to send to the ED for evaluation however the patient declined and presented to the " clinic during this visit.  Recommendations at the end of this visit were to recheck blood work if worsening to send a ED and if improving recheck in 1 week.      Differential Dx/ MDM/ Workup:  74-year-old female sent in by her cardiologist for worsening renal function as well as mildly elevated potassium.  The patient is asymptomatic making heart failure as well as ACS unlikely.  Will recheck the labs.  It was now been 3-4 days since she has stopped both Lasix as well as Aldactone.  EKG will be reviewed.    Patient has no other symptoms that necessitate any further workup other labs.    ED Course as of 10/07/24 1633   Mon Oct 07, 2024   1600 EKG 12-lead  EKG independently interpreted by myself shows a ventricular paced rhythm at a rate of 70, widened QRS consistent with ventricular pacing.  No ST elevation.  No peaked T-waves.  Overall impression ventricular paced rhythm otherwise no acute disease.  Compared to an EKG on September 19, 2024 shows no change. [SM]   1608 POC Potassium: 5.0  Improvement of potassium with previous being 5.3. [SM]   1609 POC Creatinine(!): 1.9  Slight improvement seen with creatinine. [SM]   1612 Discussed the results with the patient.  Will discharge her to follow up with Cardiology.  Will also send a note to the cardiology team making them aware of the results and to follow up as an outpatient with the patient regarding when to restart Aldactone and Lasix. []      ED Course User Index  [SM] Humberto Francis DO                 Diagnostic Impression:    1. Abnormal blood chemistry level    2. Hyperkalemia         ED Disposition Condition    Discharge Stable            ED Prescriptions    None       Follow-up Information    None          Humberto Francis DO  10/07/24 1633

## 2024-10-07 NOTE — ED TRIAGE NOTES
Pt presents to the ED via private transport with complaints of being told to come to the ED due to having a high potassium on her out patient labs. Denies chest pain, SOB. Hx CHF. Denies stents.

## 2024-10-07 NOTE — ED NOTES
I-STAT Chem-8+ Results:   Value Reference Range   Sodium 138 136-145 mmol/L   Potassium  5.0 3.5-5.1 mmol/L   Chloride 105  mmol/L   Ionized Calcium 1.14 1.06-1.42 mmol/L   CO2 (measured) 24 23-29 mmol/L   Glucose 129  mg/dL   BUN 31 6-30 mg/dL   Creatinine 1.9 0.5-1.4 mg/dL   Hematocrit 39 36-54%

## 2024-10-07 NOTE — PROGRESS NOTES
Patient unable to  zirconium over the weekend. Given the difficulty of communication with patient and her continued hyperK/GILSON I referred her to the ED. She voiced understanding and agreed to present to ED for labs and possible admission if she continues to have issues

## 2024-10-09 ENCOUNTER — TELEPHONE (OUTPATIENT)
Dept: CARDIOLOGY | Facility: CLINIC | Age: 74
End: 2024-10-09
Payer: MEDICARE

## 2024-10-09 NOTE — TELEPHONE ENCOUNTER
----- Message from Ronny sent at 10/9/2024  3:54 PM CDT -----  .Type:  Needs Medical Advice    Who Called: pt    Would the patient rather a call back or a response via [facility]? Call back  Best Call Back Number: [phone]  Additional Information:     Pt stated she would like a call back regarding her appt on 10/18

## 2024-10-15 ENCOUNTER — HOSPITAL ENCOUNTER (OUTPATIENT)
Dept: CARDIOLOGY | Facility: HOSPITAL | Age: 74
Discharge: HOME | End: 2024-10-15
Attending: STUDENT IN AN ORGANIZED HEALTH CARE EDUCATION/TRAINING PROGRAM
Payer: MEDICARE

## 2024-10-15 ENCOUNTER — OFFICE VISIT (OUTPATIENT)
Dept: TRANSPLANT | Facility: CLINIC | Age: 74
End: 2024-10-15
Attending: INTERNAL MEDICINE
Payer: MEDICARE

## 2024-10-15 VITALS — SYSTOLIC BLOOD PRESSURE: 122 MMHG | DIASTOLIC BLOOD PRESSURE: 64 MMHG | OXYGEN SATURATION: 99 % | HEART RATE: 70 BPM

## 2024-10-15 VITALS
HEART RATE: 70 BPM | WEIGHT: 233.69 LBS | DIASTOLIC BLOOD PRESSURE: 75 MMHG | BODY MASS INDEX: 37.56 KG/M2 | SYSTOLIC BLOOD PRESSURE: 122 MMHG | HEIGHT: 66 IN

## 2024-10-15 DIAGNOSIS — I50.42 CHRONIC COMBINED SYSTOLIC AND DIASTOLIC HEART FAILURE: Primary | ICD-10-CM

## 2024-10-15 DIAGNOSIS — E78.2 MIXED HYPERLIPIDEMIA: ICD-10-CM

## 2024-10-15 DIAGNOSIS — I48.0 PAROXYSMAL ATRIAL FIBRILLATION: ICD-10-CM

## 2024-10-15 DIAGNOSIS — Z79.4 TYPE 2 DIABETES MELLITUS WITHOUT COMPLICATION, WITH LONG-TERM CURRENT USE OF INSULIN: ICD-10-CM

## 2024-10-15 DIAGNOSIS — E66.01 CLASS 2 SEVERE OBESITY DUE TO EXCESS CALORIES WITH SERIOUS COMORBIDITY AND BODY MASS INDEX (BMI) OF 38.0 TO 38.9 IN ADULT: ICD-10-CM

## 2024-10-15 DIAGNOSIS — I50.42 CHRONIC COMBINED SYSTOLIC AND DIASTOLIC HEART FAILURE: ICD-10-CM

## 2024-10-15 DIAGNOSIS — E11.9 TYPE 2 DIABETES MELLITUS WITHOUT COMPLICATION, WITH LONG-TERM CURRENT USE OF INSULIN: ICD-10-CM

## 2024-10-15 DIAGNOSIS — I50.20 HFREF (HEART FAILURE WITH REDUCED EJECTION FRACTION): ICD-10-CM

## 2024-10-15 DIAGNOSIS — E66.812 CLASS 2 SEVERE OBESITY DUE TO EXCESS CALORIES WITH SERIOUS COMORBIDITY AND BODY MASS INDEX (BMI) OF 38.0 TO 38.9 IN ADULT: ICD-10-CM

## 2024-10-15 PROBLEM — R94.39 ABNORMAL NUCLEAR STRESS TEST: Status: RESOLVED | Noted: 2024-07-09 | Resolved: 2024-10-15

## 2024-10-15 LAB
ASCENDING AORTA: 3.42 CM
AV AREA BY CONTINUOUS VTI: 2.4 CM2
AV INDEX (PROSTH): 0.54
AV LVOT MEAN GRADIENT: 1 MMHG
AV LVOT PEAK GRADIENT: 1 MMHG
AV MEAN GRADIENT: 3.7 MMHG
AV PEAK GRADIENT: 6.8 MMHG
AV VALVE AREA BY VELOCITY RATIO: 2.1 CM²
AV VALVE AREA: 2.4 CM2
AV VELOCITY RATIO: 0.46
BSA FOR ECHO PROCEDURE: 2.22 M2
CV ECHO LV RWT: 0.47 CM
DOP CALC AO PEAK VEL: 1.3 M/S
DOP CALC AO VTI: 25.2 CM
DOP CALC LVOT AREA: 4.5 CM2
DOP CALC LVOT DIAMETER: 2.4 CM
DOP CALC LVOT PEAK VEL: 0.6 M/S
DOP CALC LVOT STROKE VOLUME: 61 CM3
DOP CALCLVOT PEAK VEL VTI: 13.5 CM
E WAVE DECELERATION TIME: 174.91 MS
E/A RATIO: 1.98
E/E' RATIO: 27 M/S
ECHO EF ESTIMATED: 46 %
ECHO LV POSTERIOR WALL: 1.1 CM (ref 0.6–1.1)
FRACTIONAL SHORTENING: 23.4 % (ref 28–44)
INTERVENTRICULAR SEPTUM: 1.3 CM (ref 0.6–1.1)
IVC DIAMETER: 1.38 CM
IVRT: 110.37 MS
LA MAJOR: 5 CM
LA MINOR: 5 CM
LA WIDTH: 4 CM
LEFT ATRIUM SIZE: 4.5 CM
LEFT ATRIUM VOLUME INDEX MOD: 36.5 ML/M2
LEFT ATRIUM VOLUME INDEX: 35.9 ML/M2
LEFT ATRIUM VOLUME MOD: 77.8 ML
LEFT ATRIUM VOLUME: 76.5 CM3
LEFT INTERNAL DIMENSION IN SYSTOLE: 3.6 CM (ref 2.1–4)
LEFT VENTRICLE DIASTOLIC VOLUME INDEX: 48.25 ML/M2
LEFT VENTRICLE DIASTOLIC VOLUME: 102.78 ML
LEFT VENTRICLE MASS INDEX: 99.5 G/M2
LEFT VENTRICLE SYSTOLIC VOLUME INDEX: 26.3 ML/M2
LEFT VENTRICLE SYSTOLIC VOLUME: 55.99 ML
LEFT VENTRICULAR INTERNAL DIMENSION IN DIASTOLE: 4.7 CM (ref 3.5–6)
LEFT VENTRICULAR MASS: 212 G
LV LATERAL E/E' RATIO: 27
LV SEPTAL E/E' RATIO: 27
MV PEAK A VEL: 0.41 M/S
MV PEAK E VEL: 0.81 M/S
OHS CV RV/LV RATIO: 0.85 CM
PISA TR MAX VEL: 2.24 M/S
PULM VEIN S/D RATIO: 0.74
PV PEAK D VEL: 0.5 M/S
PV PEAK S VEL: 0.37 M/S
RA MAJOR: 5.23 CM
RA PRESSURE ESTIMATED: 3 MMHG
RA WIDTH: 3.56 CM
RIGHT ATRIAL AREA: 15.8 CM2
RIGHT VENTRICLE DIASTOLIC BASEL DIMENSION: 4 CM
RV TB RVSP: 5 MMHG
RV TISSUE DOPPLER FREE WALL SYSTOLIC VELOCITY 1 (APICAL 4 CHAMBER VIEW): 10.28 CM/S
SINUS: 3.54 CM
STJ: 2.75 CM
TDI LATERAL: 0.03 M/S
TDI SEPTAL: 0.03 M/S
TDI: 0.03 M/S
TR MAX PG: 20 MMHG
TRICUSPID ANNULAR PLANE SYSTOLIC EXCURSION: 1.96 CM
TV PEAK GRADIENT: 20 MMHG
TV REST PULMONARY ARTERY PRESSURE: 23 MMHG
Z-SCORE OF LEFT VENTRICULAR DIMENSION IN END DIASTOLE: -3.68
Z-SCORE OF LEFT VENTRICULAR DIMENSION IN END SYSTOLE: -1.11

## 2024-10-15 PROCEDURE — 3078F DIAST BP <80 MM HG: CPT | Mod: CPTII,S$GLB,, | Performed by: INTERNAL MEDICINE

## 2024-10-15 PROCEDURE — 3288F FALL RISK ASSESSMENT DOCD: CPT | Mod: CPTII,S$GLB,, | Performed by: INTERNAL MEDICINE

## 2024-10-15 PROCEDURE — 3044F HG A1C LEVEL LT 7.0%: CPT | Mod: CPTII,S$GLB,, | Performed by: INTERNAL MEDICINE

## 2024-10-15 PROCEDURE — 1101F PT FALLS ASSESS-DOCD LE1/YR: CPT | Mod: CPTII,S$GLB,, | Performed by: INTERNAL MEDICINE

## 2024-10-15 PROCEDURE — 99999 PR PBB SHADOW E&M-EST. PATIENT-LVL IV: CPT | Mod: PBBFAC,,, | Performed by: INTERNAL MEDICINE

## 2024-10-15 PROCEDURE — 1126F AMNT PAIN NOTED NONE PRSNT: CPT | Mod: CPTII,S$GLB,, | Performed by: INTERNAL MEDICINE

## 2024-10-15 PROCEDURE — C8929 TTE W OR WO FOL WCON,DOPPLER: HCPCS

## 2024-10-15 PROCEDURE — 93306 TTE W/DOPPLER COMPLETE: CPT | Mod: 26,,, | Performed by: INTERNAL MEDICINE

## 2024-10-15 PROCEDURE — 4010F ACE/ARB THERAPY RXD/TAKEN: CPT | Mod: CPTII,S$GLB,, | Performed by: INTERNAL MEDICINE

## 2024-10-15 PROCEDURE — 3074F SYST BP LT 130 MM HG: CPT | Mod: CPTII,S$GLB,, | Performed by: INTERNAL MEDICINE

## 2024-10-15 PROCEDURE — 99214 OFFICE O/P EST MOD 30 MIN: CPT | Mod: S$GLB,,, | Performed by: INTERNAL MEDICINE

## 2024-10-15 PROCEDURE — 1159F MED LIST DOCD IN RCRD: CPT | Mod: CPTII,S$GLB,, | Performed by: INTERNAL MEDICINE

## 2024-10-15 RX ORDER — FUROSEMIDE 20 MG/1
20 TABLET ORAL DAILY PRN
Qty: 180 TABLET | Refills: 0 | Status: SHIPPED | OUTPATIENT
Start: 2024-10-15 | End: 2025-04-13

## 2024-10-15 NOTE — PATIENT INSTRUCTIONS
You have extra fluid on you.  Please adhere to a low sodium diet (no more than 1.5 grams of sodium in 24h).  3.   Follow fluid restriction of  1. no more than 2 liters in 24 hours..   4. Your labs are not ready today.  5. I recommend angiogram when renal function is back to baseline to exclude or find coronary artery disease.  6. F/u in 2 months with labs.

## 2024-10-15 NOTE — PROGRESS NOTES
Procedure explained . 22 g sl started in left forearm for optison use. Otison given ivp via sl for imaging by amaris saini rdcs. Tolerated well. Sl d/phil after. Pressure applied.

## 2024-10-15 NOTE — PROGRESS NOTES
[facility] Trans[facility] Clinic Follow up.      Attending Physician: Randy Hung MD.  The patient's last visit with me was on Visit date not found.         HPI.  She has a past medical history significant for periods of complete heart block, s/p implantation of a dual-chamber pacemaker on 6/2/2022, paroxysmal atrial fibrillation, mildly reduced systolic dysfunction with most recent LVEF 45-50% with GIIDD, hypertension, hyperlipidemia, type II diabetes mellitus, mild persistent asthma, GERD, restless leg syndrome, and obesity (BMI 38).    She comes referred from EP for management of CMP. She reports dyspnea on exertion. Ever since starting amiodarone, she noted hot flashes. She recently developed GILSON, suspected due to over diuresis. Her valsartan, spironolactone and furosemide were stopped.      Review of Systems   Constitutional:  Negative for chills, diaphoresis and fever.   HENT:  Negative for nasal congestion, rhinorrhea and sore throat.    Eyes:  Negative for visual disturbance.   Respiratory:  Positive for shortness of breath.    Cardiovascular:  Negative for chest pain.   Gastrointestinal:  Negative for abdominal pain, diarrhea, nausea and vomiting.   Genitourinary:  Negative for difficulty urinating, dysuria and hematuria.   Integumentary:  Negative for rash.   Neurological:  Negative for seizures, syncope and light-headedness.   Psychiatric/Behavioral:  Negative for agitation and hallucinations.         Past Medical History:   Diagnosis Date    Asthma     Cardiomyopathy     CHF (congestive heart failure)     Complete heart block     Diabetes mellitus     GERD (gastroesophageal reflux disease)     Hypertension     Insomnia     Muscle spasm     Paroxysmal atrial fibrillation     Restless leg         Past Surgical History:   Procedure Laterality Date    A-V CARDIAC PACEMAKER INSERTION Bilateral  6/2/2022    Procedure: INSERTION, CARDIAC PACEMAKER, DUAL CHAMBER;  Surgeon: ESTEBAN Marie MD;  Location: [facility] EP LAB;  Service: Cardiology;  Laterality: Bilateral;  CHB, DUAL PPM, MDT, ANES, ED29    BLADDER SUSPENSION      CHOLECYSTECTOMY      COLONOSCOPY      COLONOSCOPY N/A 5/16/2022    Procedure: COLONOSCOPY;  Surgeon: Mercedes Lees MD;  Location: [facility] ENDO;  Service: Endoscopy;  Laterality: N/A;    incisional hernia repair      UPPER GASTROINTESTINAL ENDOSCOPY          Family History   Problem Relation Name Age of Onset    Breast cancer Neg Hx      Ovarian cancer Neg Hx      Cervical cancer Neg Hx      Endometrial cancer Neg Hx      Vaginal cancer Neg Hx          Review of patient's allergies indicates:   Allergen Reactions    Sulfa (sulfonamide antibiotics) Nausea Only    Statins-hmg-coa reductase inhibitors     Sulfabenzamide         Current Outpatient Medications   Medication Instructions    amiodarone (PACERONE) 200 mg, Oral, Daily    amLODIPine (NORVASC) 10 mg, Daily    aspirin (ECOTRIN) 81 mg, Oral, Daily    blood sugar diagnostic (CONTOUR NEXT TEST STRIPS MISC) Contour Next Test Strips    carvediloL (COREG) 3.125 mg, Oral, 2 times daily with meals    colestipol (COLESTID) 1 gram Tab No dose, route, or frequency recorded.    DULoxetine (CYMBALTA) 20 mg, Daily    ELIQUIS 5 mg, Oral, 2 times daily    furosemide (LASIX) 20 mg, Oral, 2 times daily    insulin glargine U-100 (Lantus) 20 Units, Nightly    lancets (MICROLET LANCET MISC) Microlet Lancet   TEST TWICE A DAY    metFORMIN (GLUCOPHAGE) 1,000 mg, 2 times daily with meals    es-hg-rt-iron-folic-K-herb 293 (ALIVE [employer]) 18 mg iron- 240 mcg-120 mcg Tab 1 tablet, Daily              vitamin B complex 100  2-herbs 100 mg Tab vitamin B complex   Take one tablet by mouth every day.    zolpidem (AMBIEN) 10 mg, Nightly PRN        There were no vitals filed for this visit.     Wt Readings from Last 3 Encounters:   10/15/24 106 kg (233 lb 11 oz)  "  10/07/24 106.6 kg (235 lb)   10/03/24 107.8 kg (237 lb 10.5 oz)     Temp Readings from Last 3 Encounters:   10/07/24 98.1 °F (36.7 °C) (Oral)   06/05/24 (!) 93 °F (33.9 °C) (Oral)   06/03/22 99.1 °F (37.3 °C) (Oral)     BP Readings from Last 3 Encounters:   10/15/24 122/75   10/07/24 122/75   10/03/24 122/63     Pulse Readings from Last 3 Encounters:   10/15/24 70   10/07/24 70   10/03/24 78        There is no height or weight on file to calculate BMI. Estimated body surface area is 2.22 meters squared as calculated from the following:    Height as of an earlier encounter on 10/15/24: 5' 5.75" (1.67 m).    Weight as of an earlier encounter on 10/15/24: 106 kg (233 lb 11 oz).     Physical Exam  Constitutional:       Appearance: She is well-developed.   HENT:      Head: Normocephalic and atraumatic.      Right Ear: External ear normal.      Left Ear: External ear normal.   Eyes:      Conjunctiva/sclera: Conjunctivae normal.      Pupils: Pupils are equal, round, and reactive to light.   Neck:      Vascular: No hepatojugular reflux or JVD.   Cardiovascular:      Rate and Rhythm: Normal rate and regular rhythm.      Pulses: Intact distal pulses.      Heart sounds: S1 normal and S2 normal. No murmur heard.     No friction rub. No gallop.   Pulmonary:      Effort: Pulmonary effort is normal.      Breath sounds: Normal breath sounds.   Abdominal:      General: Bowel sounds are normal. There is no distension.      Palpations: Abdomen is soft.      Tenderness: There is no abdominal tenderness. There is no guarding or rebound.   Musculoskeletal:      Cervical back: Normal range of motion and neck supple.      Right lower leg: No edema.      Left lower leg: No edema.   Neurological:      Mental Status: She is alert and oriented to person, place, and time.          Lab Results   Component Value Date    BNP 95 10/07/2024     10/07/2024    K 5.0 10/07/2024    MG 2.1 10/07/2024     10/07/2024    CO2 21 (L) 10/07/2024 "    BUN 30 (H) 10/07/2024    CREATININE 1.8 (H) 10/07/2024     (H) 10/07/2024    HGBA1C 6.6 (H) 06/04/2024    AST 15 10/07/2024    ALT 12 10/07/2024    ALBUMIN 3.7 10/07/2024    PROT 7.1 10/07/2024    BILITOT 0.5 10/07/2024    WBC 6.18 10/07/2024    HGB 12.5 10/07/2024    HCT 39 10/07/2024    HCT 38.9 10/07/2024     10/07/2024    INR 1.0 06/02/2022    TSH 2.261 06/04/2024    CHOL 202 (H) 06/04/2024    HDL 48 06/04/2024    LDLCALC 135.8 06/04/2024    TRIG 91 06/04/2024    FREET4 0.90 06/04/2024             Results for orders placed during the hospital encounter of 10/15/24    Echo    Interpretation Summary    Left Ventricle: The left ventricle is normal in size. Normal wall thickness. There is concentric hypertrophy. Global hypokinesis present. Septal motion is consistent with pacing. There is mildly reduced systolic function with a visually estimated ejection fraction of 45 - 50%.    Right Ventricle: Normal right ventricular cavity size. Wall thickness is normal. Systolic function is normal. Pacemaker lead present in the ventricle.    Left Atrium: Left atrium is mildly dilated.    Aortic Valve: The aortic valve is a trileaflet valve. There is mild aortic valve sclerosis. There is mild aortic regurgitation.    Tricuspid Valve: There is mild regurgitation.    Pulmonary Artery: The estimated pulmonary artery systolic pressure is 23 mmHg.    IVC/SVC: Normal venous pressure at 3 mmHg.        No results found for this or any previous visit.         Assessment and Plan:  Chronic combined systolic and diastolic heart failure  -     Ambulatory referral/consult to Adult Advanced Heart Failure  -     furosemide (LASIX) 20 MG tablet; Take 1 tablet (20 mg total) by mouth daily as needed.  Dispense: 180 tablet; Refill: 0  -     CBC Auto Differential; Future; Expected date: 12/16/2024  -     Comprehensive Metabolic Panel; Future; Expected date: 12/16/2024  -     NT-Pro Natriuretic Peptide; Future; Expected date:  12/23/2024    Paroxysmal atrial fibrillation    Mixed hyperlipidemia    HFrEF (heart failure with reduced ejection fraction)    Type 2 diabetes mellitus without complication, with long-term current use of insulin    Class 2 severe obesity due to excess calories with serious comorbidity and body mass index (BMI) of 38.0 to 38.9 in adult          Chronic systolic HF, NYHA class III, stage C.  Etiology: ischemic vs RV pacing vs a. fib  Devices: dual chamber ICD  Medications: carvedilol, amlodipine.  Hemodynamic status: warm, normotensive, euvolemic.  I personally reviewed echo images. I think her LVEF is lower than reported. I think it is 35-40% at best, with prominent desynchrony and regional wall motion abnormalities (see inferior and lateral walls).  At this time, renal function continues to slowly go back to baseline. However, creatinine is still 1.7 today. We will not start ACEI or ARBs or AA yet to allow renal function return to normal. She only should take lasix if needed for fluid retention.  We discussed work up for etiology:  -I think she should undergo upgrade to CRT as RV pacing burden is high (90% or more) and lVEF is low.  -Per report, a. Fib burden is 30% but in the presence of AV block, unclear how much of this is conducting to ventricles. If pacing is 99%, seems a. Fib conduction is low. Seems less likely enough burden to explain degree of impairment. However, if a concern, consider AV node ablation at time of CRT upgrade. This would allow to avoid side effects from amiodarone, that patient is already reporting.  -Ischemic evaluation: patient has multiple risk factors for CAD. I recommend LHC (gold standard) regardless of previous non invasive stress test results once renal function returns to baseline.    F/u in 2 months with labs.

## 2024-12-03 ENCOUNTER — TELEPHONE (OUTPATIENT)
Dept: CARDIOLOGY | Facility: CLINIC | Age: 74
End: 2024-12-03
Payer: MEDICARE

## 2024-12-03 NOTE — TELEPHONE ENCOUNTER
Pt updated on appt w. Ms Hurtado cancelled due to seeing dr Marquita Hung. She verbalized understanding.

## 2024-12-03 NOTE — TELEPHONE ENCOUNTER
----- Message from Emmy Grover sent at 12/3/2024  8:43 AM CST -----  Regarding: RE: appt  The appt with Bryant can be canceled.  ----- Message -----  From: Norma Perez, KEDAR  Sent: 12/2/2024   5:53 PM CST  To: Norma Perez RN; #  Subject: appt                                             Pt is scheduled in December w. both Ms Hurtado in general cards and dr Hung.  Please confirm that pt only needs appt w. dr Hung and that I can cancel her appt w. Ms Hurtado.      Norma,  General Cardiology Triage RN

## 2024-12-10 ENCOUNTER — TELEPHONE (OUTPATIENT)
Dept: ELECTROPHYSIOLOGY | Facility: CLINIC | Age: 74
End: 2024-12-10
Payer: MEDICARE

## 2024-12-10 ENCOUNTER — CLINICAL SUPPORT (OUTPATIENT)
Dept: CARDIOLOGY | Facility: HOSPITAL | Age: 74
End: 2024-12-10
Attending: STUDENT IN AN ORGANIZED HEALTH CARE EDUCATION/TRAINING PROGRAM
Payer: MEDICARE

## 2024-12-10 ENCOUNTER — CLINICAL SUPPORT (OUTPATIENT)
Dept: CARDIOLOGY | Facility: HOSPITAL | Age: 74
End: 2024-12-10
Payer: MEDICARE

## 2024-12-10 DIAGNOSIS — R00.1 BRADYCARDIA, UNSPECIFIED: ICD-10-CM

## 2024-12-10 DIAGNOSIS — I44.2 ATRIOVENTRICULAR BLOCK, COMPLETE: ICD-10-CM

## 2024-12-10 DIAGNOSIS — Z95.0 PRESENCE OF CARDIAC PACEMAKER: ICD-10-CM

## 2024-12-10 PROCEDURE — 93296 REM INTERROG EVL PM/IDS: CPT | Performed by: STUDENT IN AN ORGANIZED HEALTH CARE EDUCATION/TRAINING PROGRAM

## 2024-12-10 NOTE — TELEPHONE ENCOUNTER
Pt last seen in EP clinic 8/23/24:  Started antiarrhythmic therapy with low-dose amiodarone 200mg on 8/23/24 for her AF  Discussed that she may require device upgrade to a CRT-P with implantation of a coronary sinus lead.   Historically she is RV paced >99%  Anticoagulated with apixaban 5mg po BID    Persistent AF in progress since 9/15/24, despite amiodarone    EP clinic recall for Feb 2025    Will update MD on persistent AF, rate controlled, since 9/15/24 -- on OAC and amiodarone

## 2024-12-11 LAB
OHS CV AF BURDEN PERCENT: 85
OHS CV DC REMOTE DEVICE TYPE: NORMAL
OHS CV ICD SHOCK: NO
OHS CV RV PACING PERCENT: 99 %

## 2024-12-12 ENCOUNTER — TELEPHONE (OUTPATIENT)
Dept: TRANSPLANT | Facility: CLINIC | Age: 74
End: 2024-12-12
Payer: MEDICARE

## 2024-12-12 ENCOUNTER — TELEPHONE (OUTPATIENT)
Dept: ELECTROPHYSIOLOGY | Facility: CLINIC | Age: 74
End: 2024-12-12
Payer: MEDICARE

## 2024-12-12 NOTE — TELEPHONE ENCOUNTER
Attempted to contact pt as a follow up per Dr. Marie's request. Pt has a [facility] dcPPM and device transmission revealed Afib. No answer, left message requesting a call back at [phone].

## 2024-12-16 ENCOUNTER — PATIENT MESSAGE (OUTPATIENT)
Dept: ELECTROPHYSIOLOGY | Facility: CLINIC | Age: 74
End: 2024-12-16
Payer: COMMERCIAL

## 2024-12-16 ENCOUNTER — TELEPHONE (OUTPATIENT)
Dept: ELECTROPHYSIOLOGY | Facility: CLINIC | Age: 74
End: 2024-12-16
Payer: COMMERCIAL

## 2024-12-16 NOTE — TELEPHONE ENCOUNTER
Called pt as a follow up per Dr. Marie's request. No answer, left message requesting pt call [phone].

## 2024-12-16 NOTE — TELEPHONE ENCOUNTER
Spoke with pt. And informed that Dr. Marie wanted to follow up to determine how the pt was feeling due to device alert noting patient is in Afib.  Pt stated she has been experiencing increased SOB with exertion recently. Pt confirmed that she is taking Amiodarone as prescribed. Informed pt that Dr. Marie recommends a DCCV/MICKY to restore SR. Explained DCCV/MICKY procedure to patient and answered questions. Pt would like to proceed with scheduling DCCV/MICKY and requested a call back on 12/17/24 at 10AM to schedule.

## 2024-12-17 ENCOUNTER — TELEPHONE (OUTPATIENT)
Dept: ELECTROPHYSIOLOGY | Facility: CLINIC | Age: 74
End: 2024-12-17
Payer: COMMERCIAL

## 2024-12-17 ENCOUNTER — PATIENT MESSAGE (OUTPATIENT)
Dept: ELECTROPHYSIOLOGY | Facility: CLINIC | Age: 74
End: 2024-12-17
Payer: COMMERCIAL

## 2024-12-17 NOTE — TELEPHONE ENCOUNTER
Called pt to schedule DCCV. No answer, left voicemail requesting a call back at [phone] or [phone].

## 2024-12-18 ENCOUNTER — PATIENT MESSAGE (OUTPATIENT)
Dept: ELECTROPHYSIOLOGY | Facility: CLINIC | Age: 74
End: 2024-12-18
Payer: COMMERCIAL

## 2024-12-18 ENCOUNTER — TELEPHONE (OUTPATIENT)
Dept: ELECTROPHYSIOLOGY | Facility: CLINIC | Age: 74
End: 2024-12-18
Payer: COMMERCIAL

## 2024-12-18 DIAGNOSIS — I48.0 PAROXYSMAL ATRIAL FIBRILLATION: Primary | ICD-10-CM

## 2024-12-18 NOTE — TELEPHONE ENCOUNTER
Spoke with patient and scheduled procedure: DCCV/MICKY on 1/9/25 with an arrival time of 7AM.   Labs to be done at: [facility] on 1/3/25.   Confirmed that patient is not on GLP-1 agonist  Advised to hold the following meds:Metformin in the AM on day pf procedure.  Pt stated she takes Metformin at night but will take her Metformin with dinner on the evening prior to her procedure.   Pt confirmed that she takes Eliquis twice daily and denies missing any doses. Instructed pt to continue to take Eliquis twice daily and to take her AM dose on the day of her procedure with water.   Pt confirmed that she takes 30 units of Insulin nightly at bedtime.  Instructed pt to take ONLY 1/2 dose ( 15 units) on the evening prior to her procedure.   Confirmed that patient does have a SJM dcPPM in situ.   Pt verbalized understanding to the above instructions  Instructions will be sent via portal and email, as requested

## 2024-12-23 ENCOUNTER — TELEPHONE (OUTPATIENT)
Dept: ELECTROPHYSIOLOGY | Facility: CLINIC | Age: 74
End: 2024-12-23
Payer: COMMERCIAL

## 2024-12-23 NOTE — TELEPHONE ENCOUNTER
Refill called into [facility]. Coreg 3.125mg 1 tablet po BID #60 with 11 refills. Spoke with Elsa.

## 2024-12-23 NOTE — TELEPHONE ENCOUNTER
Spoke with Elsa, pharmacy tech with [facility] pharmacy in [place], refilled Coreg (Carvedilol) 3.125mg take 1 tablet po BID with meals. Dispense #60 with 11 refills.

## 2024-12-27 RX ORDER — CARVEDILOL 3.12 MG/1
3.12 TABLET ORAL 2 TIMES DAILY WITH MEALS
Qty: 60 TABLET | Refills: 11 | Status: SHIPPED | OUTPATIENT
Start: 2024-12-27 | End: 2025-12-27

## 2025-01-03 ENCOUNTER — TELEPHONE (OUTPATIENT)
Dept: ELECTROPHYSIOLOGY | Facility: CLINIC | Age: 75
End: 2025-01-03

## 2025-01-03 ENCOUNTER — PATIENT MESSAGE (OUTPATIENT)
Dept: CARDIOLOGY | Facility: HOSPITAL | Age: 75
End: 2025-01-03

## 2025-01-03 NOTE — TELEPHONE ENCOUNTER
Spoke with pt and she requested to cancel her cardioversion scheduled for 1/9/25. Pt stated at this time she has too much going on. She is caring for her grandchildren, her daughter is in the hospital and her son was recently diagnosed with cancer. Pt has a SJM dcPPM in situ. Pt states she does not know how to submit a manual transmission. Informed pt that I will request the device team reach out to her to instruct her on sending a manual transmission.Will follow up with pt once transmission is reviewed. Pt does not wish to reschedule the DCCV/MICKY at this time. Instructed pt to report to local ER for medical evaluation if she experiences persistent SOB, CP, palpitations, lightheadedness, or dizziness. Pt verbalized understanding.

## 2025-01-06 ENCOUNTER — TELEPHONE (OUTPATIENT)
Dept: CARDIOLOGY | Facility: HOSPITAL | Age: 75
End: 2025-01-06

## 2025-01-06 NOTE — TELEPHONE ENCOUNTER
Pt called the Device Clinic on this am for assistance with sending a manual transmission via her remote PPM home monitor.  (Please see 1/3/25 telephone and portal messages). Transmission was received and reviewed.  Advised pt it does reveal AF still ongoing and that a message would be sent to Dr. Marie's nurse.  Understanding was verbalized.      Message routed to Dr. Marie's RN (Anabel).

## 2025-01-06 NOTE — TELEPHONE ENCOUNTER
Spoke with pt and informed per manual device reading she is still in Afib. Pt stated at this time she cannot proceed with a DCCV/MICKY. Pt is currently caring for her young grandchildren who are home schooled and she is their primary caregiver. Pt confirmed that she is currently taking Amiodarone 200mg daily, Coreg 3.125mg daily, and Eliquis 5mg twice daily. Pt denies any SOB, CP, palpitations, dizziness, or lightheadedness. Reviewed with pt triggers for Afib, such as stress, lack of sleep, caffeine, smoking and alcohol. Will follow up with Dr. Marie for recommendations.    Follow up call with pt. Instructed pt to call when she is able to schedule her cardioversion per Dr. Marie. Pt stated she is appreciative of the call back and was able to confirm today that her grandchildren will  be enrolling in traditional school next week.  Pt will follow up once the children are settled in school.

## 2025-01-17 ENCOUNTER — TELEPHONE (OUTPATIENT)
Dept: ELECTROPHYSIOLOGY | Facility: CLINIC | Age: 75
End: 2025-01-17
Payer: MEDICARE

## 2025-01-17 DIAGNOSIS — I48.0 PAROXYSMAL ATRIAL FIBRILLATION: Primary | ICD-10-CM

## 2025-01-17 NOTE — TELEPHONE ENCOUNTER
Follow up call with patient and scheduled procedure: DCCV/MICKY on 2/86/25 with an arrival time of 9AM.   Labs to be done at: [facility] on 1/30/25; non-fasting.   Confirmed that patient is not on GLP-1 agonist  Confirmed pt is taking Eliquis 5mg twice daily and denies missing any doses.   Confirmed pt takes Metformin at night and Glargine insulin 30 units at bedtime. Instructed pt to take 1/2 dose (15 units of insulin) at bedtime night prior to procedure.   Pt has a [facility] scPPM in situ.   Instructions will be sent via portal/email, as requested

## 2025-02-04 ENCOUNTER — TELEPHONE (OUTPATIENT)
Dept: ELECTROPHYSIOLOGY | Facility: CLINIC | Age: 75
End: 2025-02-04
Payer: MEDICARE

## 2025-02-04 NOTE — TELEPHONE ENCOUNTER
Spoke to patient.     CONFIRMED procedure arrival time of 9AM on 2/6/25 for cardioversion.     Reiterated instructions including:  -Directions to check in desk  -NPO after midnight night prior to procedure; informed pt that she may drink water until 7 AM on day of procedure.   -Confirmed compliance of Eliquis. Pt confirmed that she takes Eliquis twice daily and denies missing any doses. Pt confirmed that she will take Eliquis in the AM on 2/6/25 prior to 7AM.   -Pre-procedure LABS reviewed; no significant alerts noted.   -Confirmed presence of implanted device/stimulator reviewed. Pt has a [facility] dcPPM in situ.  -Confirmed no fever, cough, or shortness of breath in the past 30 days  -Do not wear mascara day of procedure  -Pt confirmed that she will take Metformin with her dinner on 2/5/25 instead of at bedtime and will take ONLY 1/2 dose (15 units) of Glargine in the PM on 2/5/25.  Pt stated she has been experiencing BLE edema and confirmed that she is taking Lasix 20mg po BID and has a follow up with Dr. Juárez on 2/17/25.  Pt will be accompanied by her spouse.   Patient verbalized understanding of above and appreciated the call.

## 2025-02-06 ENCOUNTER — HOSPITAL ENCOUNTER (OUTPATIENT)
Dept: CARDIOLOGY | Facility: HOSPITAL | Age: 75
Discharge: HOME | End: 2025-02-06
Attending: STUDENT IN AN ORGANIZED HEALTH CARE EDUCATION/TRAINING PROGRAM
Payer: MEDICARE

## 2025-02-06 ENCOUNTER — HOSPITAL ENCOUNTER (OUTPATIENT)
Facility: HOSPITAL | Age: 75
Discharge: HOME | End: 2025-02-06
Attending: STUDENT IN AN ORGANIZED HEALTH CARE EDUCATION/TRAINING PROGRAM | Admitting: STUDENT IN AN ORGANIZED HEALTH CARE EDUCATION/TRAINING PROGRAM
Payer: MEDICARE

## 2025-02-06 ENCOUNTER — ANESTHESIA EVENT (OUTPATIENT)
Dept: MEDSURG UNIT | Facility: HOSPITAL | Age: 75
End: 2025-02-06
Payer: MEDICARE

## 2025-02-06 ENCOUNTER — ANESTHESIA (OUTPATIENT)
Dept: MEDSURG UNIT | Facility: HOSPITAL | Age: 75
End: 2025-02-06
Payer: MEDICARE

## 2025-02-06 VITALS
HEIGHT: 66 IN | DIASTOLIC BLOOD PRESSURE: 66 MMHG | HEART RATE: 60 BPM | BODY MASS INDEX: 37.77 KG/M2 | WEIGHT: 235 LBS | SYSTOLIC BLOOD PRESSURE: 116 MMHG

## 2025-02-06 VITALS
HEART RATE: 60 BPM | HEIGHT: 66 IN | DIASTOLIC BLOOD PRESSURE: 59 MMHG | BODY MASS INDEX: 37.77 KG/M2 | WEIGHT: 235 LBS | TEMPERATURE: 98 F | OXYGEN SATURATION: 98 % | RESPIRATION RATE: 22 BRPM | SYSTOLIC BLOOD PRESSURE: 118 MMHG

## 2025-02-06 DIAGNOSIS — I48.91 ATRIAL FIBRILLATION: ICD-10-CM

## 2025-02-06 DIAGNOSIS — I48.0 PAROXYSMAL ATRIAL FIBRILLATION: ICD-10-CM

## 2025-02-06 DIAGNOSIS — E11.9 TYPE 2 DIABETES MELLITUS WITHOUT COMPLICATION, WITH LONG-TERM CURRENT USE OF INSULIN: Primary | ICD-10-CM

## 2025-02-06 DIAGNOSIS — Z79.4 TYPE 2 DIABETES MELLITUS WITHOUT COMPLICATION, WITH LONG-TERM CURRENT USE OF INSULIN: Primary | ICD-10-CM

## 2025-02-06 LAB
ASCENDING AORTA: 3.7 CM
BSA FOR ECHO PROCEDURE: 2.23 M2
EJECTION FRACTION: 50 %
OHS QRS DURATION: 160 MS
OHS QRS DURATION: 162 MS
OHS QTC CALCULATION: 496 MS
OHS QTC CALCULATION: 527 MS
POCT GLUCOSE: 76 MG/DL (ref 70–110)
POCT GLUCOSE: 85 MG/DL (ref 70–110)
SINUS: 3.7 CM
STJ: 2.8 CM

## 2025-02-06 PROCEDURE — 93312 ECHO TRANSESOPHAGEAL: CPT | Mod: 26,,, | Performed by: INTERNAL MEDICINE

## 2025-02-06 PROCEDURE — 63600175 PHARM REV CODE 636 W HCPCS: Performed by: NURSE ANESTHETIST, CERTIFIED REGISTERED

## 2025-02-06 PROCEDURE — 93312 ECHO TRANSESOPHAGEAL: CPT

## 2025-02-06 PROCEDURE — 93320 DOPPLER ECHO COMPLETE: CPT | Mod: 26,,, | Performed by: INTERNAL MEDICINE

## 2025-02-06 PROCEDURE — 82962 GLUCOSE BLOOD TEST: CPT | Performed by: STUDENT IN AN ORGANIZED HEALTH CARE EDUCATION/TRAINING PROGRAM

## 2025-02-06 PROCEDURE — 93325 DOPPLER ECHO COLOR FLOW MAPG: CPT | Mod: 26,,, | Performed by: INTERNAL MEDICINE

## 2025-02-06 PROCEDURE — 93010 ELECTROCARDIOGRAM REPORT: CPT | Mod: ,,, | Performed by: INTERNAL MEDICINE

## 2025-02-06 PROCEDURE — 92960 CARDIOVERSION ELECTRIC EXT: CPT | Performed by: STUDENT IN AN ORGANIZED HEALTH CARE EDUCATION/TRAINING PROGRAM

## 2025-02-06 PROCEDURE — 92960 CARDIOVERSION ELECTRIC EXT: CPT | Mod: ,,, | Performed by: STUDENT IN AN ORGANIZED HEALTH CARE EDUCATION/TRAINING PROGRAM

## 2025-02-06 PROCEDURE — 93005 ELECTROCARDIOGRAM TRACING: CPT

## 2025-02-06 PROCEDURE — 37000009 HC ANESTHESIA EA ADD 15 MINS: Performed by: STUDENT IN AN ORGANIZED HEALTH CARE EDUCATION/TRAINING PROGRAM

## 2025-02-06 PROCEDURE — D9220A PRA ANESTHESIA: Mod: CRNA,,, | Performed by: NURSE ANESTHETIST, CERTIFIED REGISTERED

## 2025-02-06 PROCEDURE — D9220A PRA ANESTHESIA: Mod: ANES,,, | Performed by: ANESTHESIOLOGY

## 2025-02-06 PROCEDURE — 37000008 HC ANESTHESIA 1ST 15 MINUTES: Performed by: STUDENT IN AN ORGANIZED HEALTH CARE EDUCATION/TRAINING PROGRAM

## 2025-02-06 RX ORDER — LIDOCAINE HYDROCHLORIDE 20 MG/ML
INJECTION INTRAVENOUS
Status: DISCONTINUED | OUTPATIENT
Start: 2025-02-06 | End: 2025-02-06

## 2025-02-06 RX ORDER — PROPOFOL 10 MG/ML
VIAL (ML) INTRAVENOUS
Status: DISCONTINUED | OUTPATIENT
Start: 2025-02-06 | End: 2025-02-06

## 2025-02-06 RX ORDER — AMIODARONE HYDROCHLORIDE 200 MG/1
TABLET ORAL
Qty: 90 TABLET | Refills: 1 | Status: SHIPPED | OUTPATIENT
Start: 2025-02-06

## 2025-02-06 RX ORDER — SODIUM CHLORIDE 0.9 % (FLUSH) 0.9 %
3 SYRINGE (ML) INJECTION
Status: DISCONTINUED | OUTPATIENT
Start: 2025-02-06 | End: 2025-02-06 | Stop reason: HOSPADM

## 2025-02-06 RX ORDER — GLUCAGON 1 MG
1 KIT INJECTION
Status: DISCONTINUED | OUTPATIENT
Start: 2025-02-06 | End: 2025-02-06 | Stop reason: HOSPADM

## 2025-02-06 RX ADMIN — PROPOFOL 60 MG: 10 INJECTION, EMULSION INTRAVENOUS at 11:02

## 2025-02-06 RX ADMIN — PROPOFOL 150 MCG/KG/MIN: 10 INJECTION, EMULSION INTRAVENOUS at 11:02

## 2025-02-06 RX ADMIN — LIDOCAINE HYDROCHLORIDE 50 MG: 20 INJECTION INTRAVENOUS at 11:02

## 2025-02-06 NOTE — NURSING
Pre-procedure complete verification call made at 1114 for 1100 procedure. Spoke to   Fani THACKER, patient was pending device interrogation which delayed procedure start time. Patient is ready to roll for procedure.

## 2025-02-06 NOTE — DISCHARGE SUMMARY
[facility]  Cardiology  Discharge Summary      Patient Name: Morelia Holder  MRN: [id number]  Admission Date: 2/6/2025  Hospital Length of Stay: 0 days  Discharge Date and Time:  02/06/2025 12:47 PM  Attending Physician: ESTEBAN Marie MD    Discharging Provider: Carley Arnold PA-C  Primary Care Physician: Sandy, Primary Doctor    HPI:   Last OV with Dr. Marie on 8/23/2024.      Ms. Morelia Holder is a araceli 74-year-old woman who returns to clinic today for ongoing evaluation and routine device surveillance of a dual-chamber pacemaker, implanted 6/2/2022 in the setting of complete heart block, with periods of paroxysmal atrial fibrillation. She has a past medical history significant for periods of complete heart block, s/p implantation of a dual-chamber pacemaker on 6/2/2022, paroxysmal atrial fibrillation, mildly reduced systolic dysfunction with most recent LVEF 45-50% with GIIDD, hypertension, hyperlipidemia, type II diabetes mellitus, mild persistent asthma, GERD, restless leg syndrome, and obesity.      In brief review of her EP history, she is followed in general cardiology clinic with Dr. Witt and was seen following a surveillance colonoscopy that revealed a period of complete heart block. In clinic, she reported symptoms of generalized fatigue, worsened shortness of breath, and exercise intolerance. Her systolic function was preserved on echocardiogram, with her baseline ECG revealing complete heart block with incomplete RBBB. She underwent successful device implantation on 6/2/2022. Following device implantation, she was noted to have periods of paroxysmal atrial fibrillation. She was initiated on oral anticoagulation with apixaban 5mg po BID with no adverse bleeding events reported. At our previous appointments, we initiated rate control and improved antihypertensive effect with carvedilol 3.125mg po BID. She was recently admitted to [facility] from  "6/3/2024 - 6/5/2024 with complaints of shortness of breath and a cough, diagnosed with an upper respiratory tract infection. A repeat echocardiogram at this admission revealed mildly reduced systolic dysfunction with most recent LVEF 45-50% with GIIDD, potentially in the setting of an increased burden of atrial fibrillation. On device interrogation today, her overall AT/AF burden remains elevated at 31%; likely, this may even be higher secondary to periods of undersensing. She underwent a pharmacologic nuclear cardiac stress test with a SPECT revealing a fixed inferior wall perfusion defect with no reversible ischemia. A PET test has been ordered, but has not yet been completed.      In discussion with Ms. Holder today, she tells me that she is feeling overall "better" since her recent discharge home from [facility]. She reports that her breathing has improved and that she has continued to recover from her URTI. She reports continued symptoms of mild exercise intolerance. She feels that her lower extremity edema and daily weights have remained stable on lasix 40mg po daily. She denies any episodes of dizziness, lightheadedness, syncope/presyncope, palpitations, chest pain or chest discomfort, nausea or vomiting, orthopnea, or PND. She reports that her baseline shortness of breath and dyspnea with exertion has returned to her baseline. She often naps during the day. She can climb more than one flight of stairs prior to needing to take a break, and continues to attempt to increase her level of physical activity. She recently walked around her neighborhood with her grandchildren without limitation, but reports she was tired following her walking. She reports continued back and leg pain.     On 12/10/25 her device alerted the presence of afib. She reports increased shortness of breath and was recommended to undergo cardioversion.     Today, she presents for MICKY/DCCV    Indwelling Lines/Drains at time " of discharge:  Lines/Drains/Airways       None                   Hospital Course:  Patient underwent MICKY without evidence of JOSE thrombus. Proceeded with DCCV, converting from atrial fibrillation to sinus rhythm. Patient tolerated the procedure without any acute complications. Post-DCCV ECG revealed AV dual paced rhythm with prolonged AV conduction at 60 bpm. Plan to continue all home medications including carvedilol 3.125 mg BID and eliquis 5 mg BID; increase amiodarone to 200 mg BID x 14 days, then 200 mg qD thereafter. Instructed to return in 1 week for follow up ECG and in 4 weeks for clinic follow up with Conchita Jaimes NP.   Patient was assessed at bedside prior to discharge, they reported feeling well and denied chest discomfort, shortness of breath, palpitations, lightheadedness, or any other acute symptoms. Discharge instructions were discussed with patient and all questions were answered. Patient was discharged home in stable condition.       Goals of Care Treatment Preferences:  Code Status: Full Code      Consults:     Significant Diagnostic Studies: Cardiac Graphics: Echocardiogram: 2D echo with color flow doppler: No results found for this or any previous visit.    Pending Diagnostic Studies:       Procedure Component Value Units Date/Time    EKG 12-lead [[id number]]     Order Status: Sent Lab Status: No result             There are no hospital problems to display for this patient.    No new Assessment & Plan notes have been filed under this hospital service since the last note was generated.  Service: Cardiology      Discharged Condition: stable    Disposition: Home or Self Care    Follow Up:   Follow-up Information       Conchita Jaimes NP Follow up in 1 month(s).    Specialty: Cardiology  Contact information:  [address] [place] [id number]  [phone]                           Patient Instructions:   No discharge procedures on file.  Medications:  Reconciled Home Medications:       Medication List        CHANGE how you take these medications      amiodarone 200 MG Tab  Commonly known as: PACERONE  1 tab PO BID x 14 days, then 1 tab PO qD thereafter  What changed:   how much to take  how to take this  when to take this  additional instructions            CONTINUE taking these medications      ALIVE WOMEN'S ENERGY 18 mg iron- 240 mcg-120 mcg Tab  Generic drug: [id number]-um-iron-folic-K-herb 293  Take 1 tablet by mouth once daily.     amLODIPine 10 MG tablet  Commonly known as: NORVASC  Take 10 mg by mouth once daily.     aspirin 81 MG EC tablet  Commonly known as: ECOTRIN  Take 1 tablet (81 mg total) by mouth once daily.     carvediloL 3.125 MG tablet  Commonly known as: COREG  Take 1 tablet (3.125 mg total) by mouth 2 (two) times daily with meals.     colestipoL 1 gram Tab  Commonly known as: COLESTID     CONTOUR NEXT TEST STRIPS MISC  Contour Next Test Strips     DULoxetine 20 MG capsule  Commonly known as: CYMBALTA  Take 20 mg by mouth once daily.     ELIQUIS 5 mg Tab  Generic drug: apixaban  Take 1 tablet by mouth twice daily     furosemide 20 MG tablet  Commonly known as: LASIX  Take 1 tablet (20 mg total) by mouth daily as needed.     insulin glargine U-100 (Lantus) 100 unit/mL (3 mL) Inpn pen  Inject 20 Units into the skin every evening.     metFORMIN 500 MG tablet  Commonly known as: GLUCOPHAGE  Take 1,000 mg by mouth 2 (two) times daily with meals.     MICROLET LANCET MISC  Microlet Lancet   TEST TWICE A DAY     zolpidem 10 mg Tab  Commonly known as: AMBIEN  Take 10 mg by mouth nightly as needed.              Time spent on the discharge of patient: 35 minutes    Carley Arnold PA-C  Cardiology  [facility] - [facility]

## 2025-02-06 NOTE — NURSING
Report received from LUKAS wakefield RN. Pt and pt's  given d/c paper work and education reiterated. NICOLE Arnold came to the bedside to discuss plan of care. All questions and concerns addressed. Pt able to tolerate drinking and walking without issues. Pt's iv and tele removed. Pt is waiting to wheel out in wheelchair with .

## 2025-02-06 NOTE — DISCHARGE INSTRUCTIONS
Medications:  -Continue to take your home medications as listed on your medication list after you are discharged.    New Medications:  - change how you are taking amiodarone: Increase to amiodarone 200 mg twice daily for 14 days, then back to amiodarone 200 mg once daily thereafter    Diet  -You may resume oral intake after you are discharged, as long you have no swallowing difficulties.    Because you have received sedation for this procedure:  -Limit activity for the remainder of the day.  -Do not smoke for at least 6 hours and until you are fully awake and alert.  -Do not drink alcoholic beverage for 24 hours.  -Do not drive for 24 hours.  -Defer important decision making until the following day.     Go to the Emergency Department if you develop:   -Bleeding  -Weakness or numbness  -Visual, gait or speech disturbance  -New chest pain, palpitations, shortness of breath, rapid heart beat, or fainting  -Fever    Follow up:  -EKG in 1 week.  -Conchita Jaimes NP in 1 month. Call or message the office to schedule.    If you have a pacemaker, defibrillator or loop recorder that is monitored remotely by the [facility] clinic, you may be eligible to send in a remote transmission on the day of your EKG appointment in lieu of the EKG. You will be informed at discharge if this is an option. If you are instructed to send in a remote transmission, please send a manual transmission from your home monitor one week after your procedure and then call the clinic at [phone], option 4, to confirm that your remote transmission was received.    Any need to reschedule or cancel procedures, or any questions regarding your procedures should be addressed directly with the Arrhythmia Department Nurses at the following phone number: [phone].

## 2025-02-06 NOTE — ANESTHESIA POSTPROCEDURE EVALUATION
Anesthesia Post Evaluation    Patient: Morelia Holder    Procedure(s) Performed: Procedure(s) (LRB):  Cardioversion or Defibrillation (N/A)  Transesophageal echo (MICKY) intra-procedure log documentation (N/A)    Final Anesthesia Type: general      Patient location during evaluation: PACU  Patient participation: Yes- Able to Participate  Level of consciousness: awake and alert and oriented  Post-procedure vital signs: reviewed and stable  Pain management: adequate  Airway patency: patent    PONV status at discharge: No PONV  Anesthetic complications: no      Cardiovascular status: blood pressure returned to baseline and hemodynamically stable  Respiratory status: unassisted  Hydration status: euvolemic  Follow-up not needed.              Vitals Value Taken Time   /59 02/06/25 1310   Temp 36.4 °C (97.6 °F) 02/06/25 1310   Pulse 60 02/06/25 1310   Resp 22 02/06/25 1310   SpO2 98 % 02/06/25 1310         No case tracking events are documented in the log.      Pain/Beny Score: Beny Score: 10 (2/6/2025 12:15 PM)

## 2025-02-06 NOTE — ANESTHESIA PREPROCEDURE EVALUATION
02/06/2025  [facility]  Anesthesia Pre-Operative Evaluation         Patient Name: Morelia Holder  YOB: 1950  MRN: [id number]    SUBJECTIVE:     Pre-operative evaluation for Procedure(s) (LRB):  Cardioversion or Defibrillation (N/A)  Transesophageal echo (MICKY) intra-procedure log documentation (N/A)     02/06/2025    Morelia Holder is a 74 y.o. female w/ a pertinent PMHx of CHB s/p PPM, EF 45%, pAF here for MICKY/DCCV.      Full medical history listed below      LDA: None documented.    Prev airway: None documented.     GTTs: None documented.        Patient Active Problem List   Diagnosis    RUQ abdominal pain    Rectocele    GERD (gastroesophageal reflux disease)    Hypothyroidism    Complete heart block    Vaginal atrophy    Enterocele    Mixed stress and urge urinary incontinence    Nocturia more than twice per night    Irregular bowel habits    Groin rash    Class 2 severe obesity due to excess calories with serious comorbidity and body mass index (BMI) of 38.0 to 38.9 in adult    Type 2 diabetes mellitus without complication, with long-term current use of insulin    Symptomatic bradycardia    Hypertension    Presence of cardiac pacemaker    Paroxysmal atrial fibrillation    Mild persistent asthma without complication    RLS (restless legs syndrome)    Chronic combined systolic and diastolic heart failure    Upper respiratory infection    Elevated troponin level not due to acute coronary syndrome    Mixed hyperlipidemia    HFrEF (heart failure with reduced ejection fraction)    Nonischemic cardiomyopathy    Statin intolerance       Review of patient's allergies indicates:   Allergen Reactions    Sulfa (sulfonamide antibiotics) Nausea Only    Statins-hmg-coa reductase inhibitors     Sulfabenzamide        Current Inpatient Medications:      No current  facility-administered medications on file prior to encounter.     Current Outpatient Medications on File Prior to Encounter   Medication Sig Dispense Refill    amiodarone (PACERONE) 200 MG Tab Take 1 tablet (200 mg total) by mouth once daily. 30 tablet 11    amLODIPine (NORVASC) 10 MG tablet Take 10 mg by mouth once daily.      aspirin (ECOTRIN) 81 MG EC tablet Take 1 tablet (81 mg total) by mouth once daily. 90 tablet 3    carvediloL (COREG) 3.125 MG tablet Take 1 tablet (3.125 mg total) by mouth 2 (two) times daily with meals. 60 tablet 11    colestipol (COLESTID) 1 gram Tab       DULoxetine (CYMBALTA) 20 MG capsule Take 20 mg by mouth once daily.      ELIQUIS 5 mg Tab Take 1 tablet by mouth twice daily 180 tablet 3    furosemide (LASIX) 20 MG tablet Take 1 tablet (20 mg total) by mouth daily as needed. (Patient taking differently: Take 20 mg by mouth 2 (two) times daily.) 180 tablet 0    insulin glargine 100 units/mL (3mL) SubQ pen Inject 20 Units into the skin every evening.      metFORMIN (GLUCOPHAGE) 500 MG tablet Take 1,000 mg by mouth 2 (two) times daily with meals.       zolpidem (AMBIEN) 10 mg Tab Take 10 mg by mouth nightly as needed.      blood sugar diagnostic (CONTOUR NEXT TEST STRIPS MISC) Contour Next Test Strips      lancets (MICROLET LANCET MISC) Microlet Lancet   TEST TWICE A DAY      zs-od-yz-iron-folic-K-herb 293 (ALIVE WOMEN'S ENERGY) 18 mg iron- 240 mcg-120 mcg Tab Take 1 tablet by mouth once daily.         Past Surgical History:   Procedure Laterality Date    A-V CARDIAC PACEMAKER INSERTION Bilateral 6/2/2022    Procedure: INSERTION, CARDIAC PACEMAKER, DUAL CHAMBER;  Surgeon: ESTEBAN Marie MD;  Location: [facility] EP LAB;  Service: Cardiology;  Laterality: Bilateral;  CHB, DUAL PPM, MDT, ANES, ED29    BLADDER SUSPENSION      CHOLECYSTECTOMY      COLONOSCOPY      COLONOSCOPY N/A 5/16/2022    Procedure: COLONOSCOPY;  Surgeon: Mercedes Lees MD;  Location: [facility] ENDO;  Service: Endoscopy;   "Laterality: N/A;    incisional hernia repair      UPPER GASTROINTESTINAL ENDOSCOPY         Social History     Socioeconomic History    Marital status:    Tobacco Use    Smoking status: Never     Passive exposure: Never    Smokeless tobacco: Never   Substance and Sexual Activity    Alcohol use: No    Drug use: Not Currently    Sexual activity: Not Currently     Social Drivers of Health     Financial Resource Strain: Low Risk  (9/18/2024)    Overall Financial Resource Strain (CARDIA)     Difficulty of Paying Living Expenses: Not hard at all   Food Insecurity: Food Insecurity Present (9/18/2024)    Hunger Vital Sign     Worried About Running Out of Food in the Last Year: Sometimes true     Ran Out of Food in the Last Year: Never true   Physical Activity: Inactive (9/18/2024)    Exercise Vital Sign     Days of Exercise per Week: 0 days     Minutes of Exercise per Session: 0 min   Stress: Stress Concern Present (9/18/2024)    [employer] - Occupational Stress Questionnaire     Feeling of Stress : Rather much   Housing Stability: Unknown (9/18/2024)    Housing Stability Vital Sign     Unable to Pay for Housing in the Last Year: No       OBJECTIVE:     Vital Signs Range (Last 24H):         CBC:   No results for input(s): "WBC", "RBC", "HGB", "HCT", "PLT", "MCV", "MCH", "MCHC" in the last 72 hours.    CMP: No results for input(s): "NA", "K", "CL", "CO2", "BUN", "CREATININE", "GLU", "MG", "PHOS", "CALCIUM", "ALBUMIN", "PROT", "ALKPHOS", "ALT", "AST", "BILITOT" in the last 72 hours.    INR:  No results for input(s): "PT", "INR", "PROTIME", "APTT" in the last 72 hours.    Diagnostic Studies: No relevant studies.    EKG:   Results for orders placed or performed during the hospital encounter of 10/07/24   EKG 12-lead    Collection Time: 10/07/24  3:35 PM   Result Value Ref Range    QRS Duration 168 ms    [facility] QTC Calculation 498 ms    Narrative    Test Reason : E87.5,    Vent. Rate : 070 BPM    "  Atrial Rate : 241 BPM     P-R Int : 000 ms          QRS Dur : 168 ms      QT Int : 462 ms       P-R-T Axes : 000 -83 076 degrees     QTc Int : 498 ms    Ventricular-paced rhythm  Abnormal ECG  When compared with ECG of 19-SEP-2024 14:07,  No significant change was found  Confirmed by Elias CROCKETT MD ([id number]) on 10/7/2024 5:37:01 PM    Referred By: [email or web address]   SELF           Confirmed By:Elias CROCKETT MD        2D ECHO:   Results for orders placed during the hospital encounter of 10/15/24    Echo    Interpretation Summary    Left Ventricle: The left ventricle is normal in size. Normal wall thickness. There is concentric hypertrophy. Global hypokinesis present. Septal motion is consistent with pacing. There is mildly reduced systolic function with a visually estimated ejection fraction of 45 - 50%.    Right Ventricle: Normal right ventricular cavity size. Wall thickness is normal. Systolic function is normal. Pacemaker lead present in the ventricle.    Left Atrium: Left atrium is mildly dilated.    Aortic Valve: The aortic valve is a trileaflet valve. There is mild aortic valve sclerosis. There is mild aortic regurgitation.    Tricuspid Valve: There is mild regurgitation.    Pulmonary Artery: The estimated pulmonary artery systolic pressure is 23 mmHg.    IVC/SVC: Normal venous pressure at 3 mmHg.         ASSESSMENT/PLAN:           Pre-op Assessment    I have reviewed the Patient Summary Reports.     I have reviewed the Nursing Notes. I have reviewed the NPO Status.   I have reviewed the Medications.     Review of Systems  Anesthesia Hx:   History of prior surgery of interest to airway management or planning:          Denies Family Hx of Anesthesia complications.    Denies Personal Hx of Anesthesia complications.                        Physical Exam  General: Well nourished, Cooperative, Alert and Oriented    Airway:  Mallampati: II   Mouth Opening: Normal  TM Distance: Normal  Tongue: Normal  Neck ROM: Normal  ROM    Dental:  Intact    Chest/Lungs:  Clear to auscultation, Normal Respiratory Rate    Heart:  Rate: Normal  Rhythm: Regular Rhythm        Anesthesia Plan  Type of Anesthesia, risks & benefits discussed:    Anesthesia Type: Gen Natural Airway  Intra-op Monitoring Plan: Standard ASA Monitors  Post Op Pain Control Plan: multimodal analgesia and IV/PO Opioids PRN  Induction:  IV  Airway Plan: Video  Informed Consent: Informed consent signed with the Patient and all parties understand the risks and agree with anesthesia plan.  All questions answered.   ASA Score: 3  Day of Surgery Review of History & Physical: H&P Update referred to the surgeon/provider.    Ready For Surgery From Anesthesia Perspective.     .

## 2025-02-06 NOTE — HOSPITAL COURSE
Patient underwent MICKY without evidence of JOSE thrombus. Proceeded with DCCV, converting from atrial fibrillation to sinus rhythm. Patient tolerated the procedure without any acute complications. Post-DCCV ECG revealed AV dual paced rhythm with prolonged AV conduction at 60 bpm. Plan to continue all home medications including carvedilol 3.125 mg BID and eliquis 5 mg BID; increase amiodarone to 200 mg BID x 14 days, then 200 mg qD thereafter. Instructed to return in 1 week for follow up ECG and in 4 weeks for clinic follow up with Conchita Jaimes NP.   Patient was assessed at bedside prior to discharge, they reported feeling well and denied chest discomfort, shortness of breath, palpitations, lightheadedness, or any other acute symptoms. Discharge instructions were discussed with patient and all questions were answered. Patient was discharged home in stable condition.

## 2025-02-06 NOTE — NURSING
Pt prepped in room 7A on SSCU. Pt is AAOx4 and follows commands appropriately. Pt's vs wnl and pt is free from s/s of intolerble pain/distress. Pt's  is at bedside. Prep questions completed, Iv started,  12 lead EKG not yet completed, and procedural consents not done at this time. ANES consent done. Safety measures in place.

## 2025-02-06 NOTE — TRANSFER OF CARE
"Anesthesia Transfer of Care Note    Patient: Morelia Holder    Procedure(s) Performed: Procedure(s) (LRB):  Cardioversion or Defibrillation (N/A)  Transesophageal echo (MICKY) intra-procedure log documentation (N/A)    Patient location: PACU    Anesthesia Type: general    Transport from OR: Transported from OR on 6-10 L/min O2 by face mask with adequate spontaneous ventilation    Post pain: adequate analgesia    Post assessment: no apparent anesthetic complications    Post vital signs: stable    Level of consciousness: awake, alert and oriented    Nausea/Vomiting: no nausea/vomiting    Complications: none    Transfer of care protocol was followed      Last vitals: Visit Vitals  /66 (BP Location: Left arm, Patient Position: Lying)   Temp 36.5 °C (97.7 °F) (Temporal)   Resp 14   Ht 5' 6" (1.676 m)   Wt 106.6 kg (235 lb 0.2 oz)   SpO2 96%   Breastfeeding No   BMI 37.93 kg/m²     "

## 2025-02-06 NOTE — H&P
[facility]  Cardiology  MICKY/DCCV History & Physical      Morelia Holder  YOB: 1950  Medical Record Number:  [id number]  Attending Physician:  ESTEBAN Marie MD   Date of Admission: 2/6/2025       Hospital Day:  0  Current Principal Problem:  Atrial fibrillation    Patient information was obtained from the patient and by chart review.     History     Cc: Atrial fibrillation     HPI  Last OV with Dr. Marie on 8/23/2024.     Ms. Morelia Holder is a araceli 74-year-old woman who returns to clinic today for ongoing evaluation and routine device surveillance of a dual-chamber pacemaker, implanted 6/2/2022 in the setting of complete heart block, with periods of paroxysmal atrial fibrillation. She has a past medical history significant for periods of complete heart block, s/p implantation of a dual-chamber pacemaker on 6/2/2022, paroxysmal atrial fibrillation, mildly reduced systolic dysfunction with most recent LVEF 45-50% with GIIDD, hypertension, hyperlipidemia, type II diabetes mellitus, mild persistent asthma, GERD, restless leg syndrome, and obesity.      In brief review of her EP history, she is followed in general cardiology clinic with Dr. Witt and was seen following a surveillance colonoscopy that revealed a period of complete heart block. In clinic, she reported symptoms of generalized fatigue, worsened shortness of breath, and exercise intolerance. Her systolic function was preserved on echocardiogram, with her baseline ECG revealing complete heart block with incomplete RBBB. She underwent successful device implantation on 6/2/2022. Following device implantation, she was noted to have periods of paroxysmal atrial fibrillation. She was initiated on oral anticoagulation with apixaban 5mg po BID with no adverse bleeding events reported. At our previous appointments, we initiated rate control and improved antihypertensive effect with carvedilol 3.125mg  "po BID. She was recently admitted to [facility] from 6/3/2024 - 6/5/2024 with complaints of shortness of breath and a cough, diagnosed with an upper respiratory tract infection. A repeat echocardiogram at this admission revealed mildly reduced systolic dysfunction with most recent LVEF 45-50% with GIIDD, potentially in the setting of an increased burden of atrial fibrillation. On device interrogation today, her overall AT/AF burden remains elevated at 31%; likely, this may even be higher secondary to periods of undersensing. She underwent a pharmacologic nuclear cardiac stress test with a SPECT revealing a fixed inferior wall perfusion defect with no reversible ischemia. A PET test has been ordered, but has not yet been completed.      In discussion with Ms. Holder today, she tells me that she is feeling overall "better" since her recent discharge home from [facility]. She reports that her breathing has improved and that she has continued to recover from her URTI. She reports continued symptoms of mild exercise intolerance. She feels that her lower extremity edema and daily weights have remained stable on lasix 40mg po daily. She denies any episodes of dizziness, lightheadedness, syncope/presyncope, palpitations, chest pain or chest discomfort, nausea or vomiting, orthopnea, or PND. She reports that her baseline shortness of breath and dyspnea with exertion has returned to her baseline. She often naps during the day. She can climb more than one flight of stairs prior to needing to take a break, and continues to attempt to increase her level of physical activity. She recently walked around her neighborhood with her grandchildren without limitation, but reports she was tired following her walking. She reports continued back and leg pain.    On 12/10/25 her device alerted the presence of afib. She reports increased shortness of breath and was recommended to undergo " cardioversion.    Today, she presents for MICKY/DCCV    Rate/rhythm control: carvedilol 3.125 mg BID, amiodarone 200 mg qD  Anticoagulant/antiplatelets: eliquis 5 mg BID  ECG: Atrial fibrillation, vent rate 70 BPM  Platelet count: 222  INR: 1.0    History of stroke:  no  Dysphagia or odynophagia:  no  Liver Disease, esophageal disease, or known varices:  no  Upper GI Bleeding:  no  Snoring:  no   Sleep Apnea:  y  Prior neck surgery or radiation:  no  History of anesthetic difficulties:  no  Family history of anesthetic difficulties:  no  Last oral intake: last pm   Able to move neck in all directions:  yes  GLP-1 Use: no        Medications - Outpatient  Prior to Admission medications    Medication Sig Start Date End Date Taking? Authorizing Provider   amiodarone (PACERONE) 200 MG Tab Take 1 tablet (200 mg total) by mouth once daily. 8/23/24 8/23/25  ESTEBAN Marie MD   amLODIPine (NORVASC) 10 MG tablet Take 10 mg by mouth once daily.    Provider, Historical   aspirin (ECOTRIN) 81 MG EC tablet Take 1 tablet (81 mg total) by mouth once daily. 7/9/24 7/9/25  Herman Gardiner, NP   blood sugar diagnostic (CONTOUR NEXT TEST STRIPS MISC) Contour Next Test Strips    Provider, Historical   carvediloL (COREG) 3.125 MG tablet Take 1 tablet (3.125 mg total) by mouth 2 (two) times daily with meals. 12/27/24 12/27/25  ESTEBAN Marie MD   colestipol (COLESTID) 1 gram Tab     Provider, Historical   DULoxetine (CYMBALTA) 20 MG capsule Take 20 mg by mouth once daily.    Provider, Historical   ELIQUIS 5 mg Tab Take 1 tablet by mouth twice daily 3/22/24   ESTEBAN Marie MD   furosemide (LASIX) 20 MG tablet Take 1 tablet (20 mg total) by mouth daily as needed.  Patient taking differently: Take 20 mg by mouth 2 (two) times daily. 10/15/24 4/13/25  Randy Maldonado MD   insulin glargine 100 units/mL (3mL) SubQ pen Inject 20 Units into the skin every evening.    Provider, Historical   lancets (MICROLET LANCET MISC)  Microlet Lancet   TEST TWICE A DAY    Provider, Historical   metFORMIN (GLUCOPHAGE) 500 MG tablet Take 1,000 mg by mouth 2 (two) times daily with meals.     Provider, Historical   vo-wi-vk-iron-folic-K-herb 293 (ALIVE WOMEN'S ENERGY) 18 mg iron- 240 mcg-120 mcg Tab Take 1 tablet by mouth once daily.    Provider, Historical   zolpidem (AMBIEN) 10 mg Tab Take 10 mg by mouth nightly as needed.    Provider, Historical         Medications - Current  Scheduled Meds:  Continuous Infusions:  PRN Meds:.      Allergies  Review of patient's allergies indicates:   Allergen Reactions    Sulfa (sulfonamide antibiotics) Nausea Only    Statins-hmg-coa reductase inhibitors     Sulfabenzamide          Past Medical History  Past Medical History:   Diagnosis Date    Asthma     Cardiomyopathy     CHF (congestive heart failure)     Complete heart block     Diabetes mellitus     GERD (gastroesophageal reflux disease)     Hypertension     Insomnia     Muscle spasm     Paroxysmal atrial fibrillation     Restless leg          Past Surgical History  Past Surgical History:   Procedure Laterality Date    A-V CARDIAC PACEMAKER INSERTION Bilateral 6/2/2022    Procedure: INSERTION, CARDIAC PACEMAKER, DUAL CHAMBER;  Surgeon: ESTEBAN Marie MD;  Location: [facility] EP LAB;  Service: Cardiology;  Laterality: Bilateral;  CHB, DUAL PPM, MDT, ANES, ED29    BLADDER SUSPENSION      CHOLECYSTECTOMY      COLONOSCOPY      COLONOSCOPY N/A 5/16/2022    Procedure: COLONOSCOPY;  Surgeon: Mercedes Lees MD;  Location: [facility] ENDO;  Service: Endoscopy;  Laterality: N/A;    incisional hernia repair      UPPER GASTROINTESTINAL ENDOSCOPY           Social History  Social History     Socioeconomic History    Marital status:    Tobacco Use    Smoking status: Never     Passive exposure: Never    Smokeless tobacco: Never   Substance and Sexual Activity    Alcohol use: No    Drug use: Not Currently    Sexual activity: Not Currently     Social Drivers of Health      Financial Resource Strain: Low Risk  (9/18/2024)    Overall Financial Resource Strain (CARDIA)     Difficulty of Paying Living Expenses: Not hard at all   Food Insecurity: Food Insecurity Present (9/18/2024)    Hunger Vital Sign     Worried About Running Out of Food in the Last Year: Sometimes true     Ran Out of Food in the Last Year: Never true   Physical Activity: Inactive (9/18/2024)    Exercise Vital Sign     Days of Exercise per Week: 0 days     Minutes of Exercise per Session: 0 min   Stress: Stress Concern Present (9/18/2024)    [employer] - Occupational Stress Questionnaire     Feeling of Stress : Rather much   Housing Stability: Unknown (9/18/2024)    Housing Stability Vital Sign     Unable to Pay for Housing in the Last Year: No         ROS  Review of Systems   Constitutional: Negative for chills.   HENT: Negative.     Eyes: Negative.    Cardiovascular:  Negative for chest pain, dyspnea on exertion and palpitations.   Respiratory: Negative.   Shortness of breath   Endocrine: Negative.    Musculoskeletal: Negative.    Gastrointestinal:  Negative for hematemesis, melena, nausea and vomiting.   Genitourinary: Negative.    Neurological: Negative.    Psychiatric/Behavioral: Negative.  Negative for altered mental status.    Allergic/Immunologic: Negative.    Physical Examination         Vital Signs  24 Hour VS Range       No intake or output data in the 24 hours ending 02/06/25 0944      Physical Exam:   Physical Exam  Vitals and nursing note reviewed.   Constitutional:       Appearance: Normal appearance.   HENT:      Head: Normocephalic and atraumatic.   Neck:      Vascular: No carotid bruit, hepatojugular reflux or JVD.   Cardiovascular:      Rate and Rhythm: Normal rate. Rhythm irregularly irregular.      Chest Wall: PMI is not displaced.      Pulses:           Carotid pulses are 2+ on the right side and 2+ on the left side.       Radial pulses are 2+ on the right side and  "2+ on the left side.        Dorsalis pedis pulses are 2+ on the right side and 2+ on the left side.        Posterior tibial pulses are 2+ on the right side and 2+ on the left side.      Heart sounds: S1 normal and S2 normal. No murmur heard.  Pulmonary:      Effort: Pulmonary effort is normal.      Breath sounds: Normal breath sounds. No wheezing, rhonchi or rales.   Abdominal:      General: Bowel sounds are normal. There is no abdominal bruit.      Palpations: Abdomen is soft. There is no pulsatile mass.      Tenderness: There is no abdominal tenderness.   Musculoskeletal:      Right lower leg: No edema.      Left lower leg: No edema.   Feet:      Right foot:      Skin integrity: Skin integrity normal.      Left foot:      Skin integrity: Skin integrity normal.   Skin:     Capillary Refill: Capillary refill takes less than 2 seconds.   Neurological:      General: No focal deficit present.      Mental Status: She is alert.   Psychiatric:         Mood and Affect: Mood and affect normal.         Speech: Speech normal.         Behavior: Behavior is cooperative.         Thought Content: Thought content normal.               Data       Recent Labs   Lab 01/30/25  1143   WBC 4.90   HGB 12.9   HCT 38.3           Recent Labs   Lab 01/30/25  1143   INR 1.0        Recent Labs   Lab 01/30/25  1143      K 4.3      CO2 26   BUN 25*   CREATININE 1.6*   ANIONGAP 9   CALCIUM 9.0        No results for input(s): "PROT", "ALBUMIN", "BILITOT", "ALKPHOS", "AST", "ALT" in the last 168 hours.     No results for input(s): "TROPONINI" in the last 168 hours.     BNP (pg/mL)   Date Value   10/15/2024 92   10/07/2024 95   06/03/2024 166 (H)   06/02/2022 252 (H)       No results for input(s): "LABBLOO" in the last 168 hours.         Assessment & Plan     # Atrial fibrillation     Dr. Dr. Marie's Plan:       Last TTE/MICKY:  TTE 10/15/2024    Left Ventricle: The left ventricle is normal in size. Normal wall thickness. There " is concentric hypertrophy. Global hypokinesis present. Septal motion is consistent with pacing. There is mildly reduced systolic function with a visually estimated ejection fraction of 45 - 50%.    Right Ventricle: Normal right ventricular cavity size. Wall thickness is normal. Systolic function is normal. Pacemaker lead present in the ventricle.    Left Atrium: Left atrium is mildly dilated.    Aortic Valve: The aortic valve is a trileaflet valve. There is mild aortic valve sclerosis. There is mild aortic regurgitation.    Tricuspid Valve: There is mild regurgitation.    Pulmonary Artery: The estimated pulmonary artery systolic pressure is 23 mmHg.    IVC/SVC: Normal venous pressure at 3 mmHg.         -No absolute contraindications of esophageal stricture, tumor, perforation, laceration,or diverticulum and/or active GI bleed.  -The risks, benefits & alternatives of the procedure were explained to the patient.   -The risks of transesophageal echo include but are not limited to:  Dental trauma, esophageal trauma/perforation, bleeding, laryngospasm/brochospasm, aspiration, sore throat/hoarseness, & dislodgement of the endotracheal tube/nasogastric tube (where applicable).    -The risks of moderate sedation include hypotension, respiratory depression, arrhythmias, bronchospasm, & death.    -Prior to procedure, extensive discussion with patient regarding risks and benefits of DCCV at bedside today. The patient voices understanding, all questions have been answered, and patient would like to proceed.  -Informed consent was obtained. The patient is agreeable to proceed with the procedure and all questions and concerns addressed.    Case was discussed with an attending physician prior to procedure.    Carley Arnold PA-C  [facility]

## 2025-02-06 NOTE — PROGRESS NOTES
Nursing Transfer Note        Reason patient is being transferred: back to short stay post recovery    Transfer To: [facility] stay 7c    Transfer via stretcher    Transfer with room air    Transported by RN    Telemetry: n/a    Medicines sent: none    Any special needs or follow-up needed: 1 hour bedrest and NPO complete    Patient belongings transferred with patient: No    Chart send with patient: Yes    Notified: spouse    Patient reassessed at: to be done by receiving RN (date, time)

## 2025-02-14 DIAGNOSIS — I44.2 COMPLETE HEART BLOCK: Primary | ICD-10-CM

## 2025-02-17 ENCOUNTER — OFFICE VISIT (OUTPATIENT)
Dept: TRANSPLANT | Facility: CLINIC | Age: 75
End: 2025-02-17
Payer: MEDICARE

## 2025-02-17 ENCOUNTER — LAB VISIT (OUTPATIENT)
Dept: LAB | Facility: HOSPITAL | Age: 75
End: 2025-02-17
Attending: INTERNAL MEDICINE
Payer: MEDICARE

## 2025-02-17 VITALS
HEART RATE: 98 BPM | DIASTOLIC BLOOD PRESSURE: 65 MMHG | BODY MASS INDEX: 38.72 KG/M2 | SYSTOLIC BLOOD PRESSURE: 131 MMHG | WEIGHT: 240.94 LBS | HEIGHT: 66 IN | OXYGEN SATURATION: 96 %

## 2025-02-17 DIAGNOSIS — I50.42 CHRONIC COMBINED SYSTOLIC AND DIASTOLIC HEART FAILURE: Primary | ICD-10-CM

## 2025-02-17 DIAGNOSIS — I50.42 CHRONIC COMBINED SYSTOLIC AND DIASTOLIC HEART FAILURE: ICD-10-CM

## 2025-02-17 LAB
ALBUMIN SERPL BCP-MCNC: 3.5 G/DL (ref 3.5–5.2)
ALP SERPL-CCNC: 40 U/L (ref 40–150)
ALT SERPL W/O P-5'-P-CCNC: 14 U/L (ref 10–44)
ANION GAP SERPL CALC-SCNC: 7 MMOL/L (ref 8–16)
AST SERPL-CCNC: 18 U/L (ref 10–40)
BASOPHILS # BLD AUTO: 0.03 K/UL (ref 0–0.2)
BASOPHILS NFR BLD: 0.7 % (ref 0–1.9)
BILIRUB SERPL-MCNC: 0.4 MG/DL (ref 0.1–1)
BUN SERPL-MCNC: 29 MG/DL (ref 8–23)
CALCIUM SERPL-MCNC: 8.8 MG/DL (ref 8.7–10.5)
CHLORIDE SERPL-SCNC: 106 MMOL/L (ref 95–110)
CO2 SERPL-SCNC: 26 MMOL/L (ref 23–29)
CREAT SERPL-MCNC: 1.6 MG/DL (ref 0.5–1.4)
DIFFERENTIAL METHOD BLD: ABNORMAL
EOSINOPHIL # BLD AUTO: 0.1 K/UL (ref 0–0.5)
EOSINOPHIL NFR BLD: 3.1 % (ref 0–8)
ERYTHROCYTE [DISTWIDTH] IN BLOOD BY AUTOMATED COUNT: 13.4 % (ref 11.5–14.5)
EST. GFR  (NO RACE VARIABLE): 33.6 ML/MIN/1.73 M^2
GLUCOSE SERPL-MCNC: 121 MG/DL (ref 70–110)
HCT VFR BLD AUTO: 37.2 % (ref 37–48.5)
HGB BLD-MCNC: 12 G/DL (ref 12–16)
IMM GRANULOCYTES # BLD AUTO: 0.02 K/UL (ref 0–0.04)
IMM GRANULOCYTES NFR BLD AUTO: 0.4 % (ref 0–0.5)
LYMPHOCYTES # BLD AUTO: 1 K/UL (ref 1–4.8)
LYMPHOCYTES NFR BLD: 22.7 % (ref 18–48)
MCH RBC QN AUTO: 30.9 PG (ref 27–31)
MCHC RBC AUTO-ENTMCNC: 32.3 G/DL (ref 32–36)
MCV RBC AUTO: 96 FL (ref 82–98)
MONOCYTES # BLD AUTO: 0.4 K/UL (ref 0.3–1)
MONOCYTES NFR BLD: 8 % (ref 4–15)
NEUTROPHILS # BLD AUTO: 2.9 K/UL (ref 1.8–7.7)
NEUTROPHILS NFR BLD: 65.1 % (ref 38–73)
NRBC BLD-RTO: 0 /100 WBC
PLATELET # BLD AUTO: 179 K/UL (ref 150–450)
PMV BLD AUTO: 10.8 FL (ref 9.2–12.9)
POTASSIUM SERPL-SCNC: 4.6 MMOL/L (ref 3.5–5.1)
PROT SERPL-MCNC: 6.4 G/DL (ref 6–8.4)
RBC # BLD AUTO: 3.88 M/UL (ref 4–5.4)
SODIUM SERPL-SCNC: 139 MMOL/L (ref 136–145)
T3FREE SERPL-MCNC: 2.1 PG/ML (ref 2.3–4.2)
T4 FREE SERPL-MCNC: 0.87 NG/DL (ref 0.71–1.51)
TSH SERPL DL<=0.005 MIU/L-ACNC: 4.62 UIU/ML (ref 0.4–4)
WBC # BLD AUTO: 4.5 K/UL (ref 3.9–12.7)

## 2025-02-17 PROCEDURE — 3075F SYST BP GE 130 - 139MM HG: CPT | Mod: CPTII,S$GLB,, | Performed by: INTERNAL MEDICINE

## 2025-02-17 PROCEDURE — 83880 ASSAY OF NATRIURETIC PEPTIDE: CPT | Performed by: INTERNAL MEDICINE

## 2025-02-17 PROCEDURE — 1126F AMNT PAIN NOTED NONE PRSNT: CPT | Mod: CPTII,S$GLB,, | Performed by: INTERNAL MEDICINE

## 2025-02-17 PROCEDURE — 84439 ASSAY OF FREE THYROXINE: CPT | Performed by: INTERNAL MEDICINE

## 2025-02-17 PROCEDURE — 84443 ASSAY THYROID STIM HORMONE: CPT | Performed by: INTERNAL MEDICINE

## 2025-02-17 PROCEDURE — 3078F DIAST BP <80 MM HG: CPT | Mod: CPTII,S$GLB,, | Performed by: INTERNAL MEDICINE

## 2025-02-17 PROCEDURE — 1101F PT FALLS ASSESS-DOCD LE1/YR: CPT | Mod: CPTII,S$GLB,, | Performed by: INTERNAL MEDICINE

## 2025-02-17 PROCEDURE — 84481 FREE ASSAY (FT-3): CPT | Performed by: INTERNAL MEDICINE

## 2025-02-17 PROCEDURE — 36415 COLL VENOUS BLD VENIPUNCTURE: CPT | Performed by: INTERNAL MEDICINE

## 2025-02-17 PROCEDURE — 3008F BODY MASS INDEX DOCD: CPT | Mod: CPTII,S$GLB,, | Performed by: INTERNAL MEDICINE

## 2025-02-17 PROCEDURE — 80053 COMPREHEN METABOLIC PANEL: CPT | Performed by: INTERNAL MEDICINE

## 2025-02-17 PROCEDURE — 85025 COMPLETE CBC W/AUTO DIFF WBC: CPT | Performed by: INTERNAL MEDICINE

## 2025-02-17 PROCEDURE — 3288F FALL RISK ASSESSMENT DOCD: CPT | Mod: CPTII,S$GLB,, | Performed by: INTERNAL MEDICINE

## 2025-02-17 PROCEDURE — 1159F MED LIST DOCD IN RCRD: CPT | Mod: CPTII,S$GLB,, | Performed by: INTERNAL MEDICINE

## 2025-02-17 NOTE — PATIENT INSTRUCTIONS
You have just the right amount of fluid on you.  Please adhere to a low sodium diet (no more than 1.5 grams of sodium in 24h).  3.   Follow fluid restriction of  1. no more than 2 liters in 24 hours..   4.  F/u in 6 months with labs.

## 2025-02-17 NOTE — PROGRESS NOTES
[facility] Transplantation Clinic Follow up.        Attending Physician: Randy Hung MD.  The patient's last visit with me was on 10/15/2024.         HPI.  She has a past medical history significant for periods of complete heart block, s/p implantation of a dual-chamber pacemaker on 6/2/2022, paroxysmal atrial fibrillation, mildly reduced systolic dysfunction with most recent LVEF 45-50% with GIIDD, hypertension, hyperlipidemia, type II diabetes mellitus, mild persistent asthma, GERD, restless leg syndrome, and obesity (BMI 38).     She comes referred from EP for management of CMP. She reports dyspnea on exertion. Ever since starting amiodarone, she noted hot flashes. She recently developed GILSON, suspected due to over diuresis. Her valsartan, spironolactone and furosemide were stopped.    February 17, 2025: improved renal function. She has been dealing with lower extremity edema. She has been taking furosemide 20 mg BID,  for the last 2 weeks.  She recently underwent MICKY and cardioversion. She takes amiodarone.    Review of Systems   Constitutional:  Negative for activity change, chills, diaphoresis and fever.   HENT:  Negative for nasal congestion, rhinorrhea and sore throat.    Eyes:  Negative for visual disturbance.   Respiratory:  Negative for cough, choking, chest tightness and shortness of breath.    Cardiovascular:  Positive for leg swelling. Negative for chest pain.   Gastrointestinal:  Negative for abdominal pain, diarrhea, nausea and vomiting.   Genitourinary:  Negative for difficulty urinating, dysuria and hematuria.   Integumentary:  Negative for rash.   Neurological:  Negative for seizures, syncope and light-headedness.   Psychiatric/Behavioral:  Negative for agitation and hallucinations.         Past Medical History:   Diagnosis Date    Asthma     Cardiomyopathy     CHF (congestive heart  failure)     Complete heart block     Diabetes mellitus     GERD (gastroesophageal reflux disease)     Hypertension     Insomnia     Muscle spasm     Paroxysmal atrial fibrillation     Restless leg         Past Surgical History:   Procedure Laterality Date    A-V CARDIAC PACEMAKER INSERTION Bilateral 6/2/2022    Procedure: INSERTION, CARDIAC PACEMAKER, DUAL CHAMBER;  Surgeon: ESTEBAN Marie MD;  Location: [facility] EP LAB;  Service: Cardiology;  Laterality: Bilateral;  CHB, DUAL PPM, MDT, ANES, ED29    BLADDER SUSPENSION      CHOLECYSTECTOMY      COLONOSCOPY      COLONOSCOPY N/A 5/16/2022    Procedure: COLONOSCOPY;  Surgeon: Mercedes Lees MD;  Location: [facility] ENDO;  Service: Endoscopy;  Laterality: N/A;    ECHOCARDIOGRAM,TRANSESOPHAGEAL N/A 2/6/2025    Procedure: Transesophageal echo (MICKY) intra-procedure log documentation;  Surgeon: Elias Allen MD;  Location: [facility] EP LAB;  Service: Cardiology;  Laterality: N/A;    incisional hernia repair      TREATMENT OF CARDIAC ARRHYTHMIA N/A 2/6/2025    Procedure: Cardioversion or Defibrillation;  Surgeon: ESTEBAN Marie MD;  Location: [facility] EP LAB;  Service: Cardiology;  Laterality: N/A;  AF, MICKY, DCCV, MAC, EH, 3 Prep.*SJM dcPPM in situ*    UPPER GASTROINTESTINAL ENDOSCOPY          Family History   Problem Relation Name Age of Onset    Breast cancer Neg Hx      Ovarian cancer Neg Hx      Cervical cancer Neg Hx      Endometrial cancer Neg Hx      Vaginal cancer Neg Hx          Review of patient's allergies indicates:   Allergen Reactions    Sulfa (sulfonamide antibiotics) Nausea Only    Statins-hmg-coa reductase inhibitors     Sulfabenzamide         Current Outpatient Medications   Medication Instructions    amiodarone (PACERONE) 200 MG Tab 1 tab PO BID x 14 days, then 1 tab PO qD thereafter    amLODIPine (NORVASC) 10 mg, Daily    aspirin (ECOTRIN) 81 mg, Oral, Daily    blood sugar diagnostic (CONTOUR NEXT TEST STRIPS MISC) Contour Next Test Strips    carvediloL  "(COREG) 3.125 mg, Oral, 2 times daily with meals    colestipol (COLESTID) 1 gram Tab No dose, route, or frequency recorded.    DULoxetine (CYMBALTA) 20 mg, Daily    ELIQUIS 5 mg, Oral, 2 times daily    furosemide (LASIX) 20 mg, Oral, Daily PRN    insulin glargine U-100 (Lantus) 20 Units, Nightly    lancets (MICROLET LANCET MISC) Microlet Lancet   TEST TWICE A DAY    metFORMIN (GLUCOPHAGE) 1,000 mg, 2 times daily with meals    kr-pg-ru-iron-folic-K-herb 293 (ALIVE WOMEN'S ENERGY) 18 mg iron- 240 mcg-120 mcg Tab 1 tablet, Daily    zolpidem (AMBIEN) 10 mg, Nightly PRN        There were no vitals filed for this visit.     Wt Readings from Last 3 Encounters:   02/17/25 109.3 kg (240 lb 15.4 oz)   02/06/25 106.6 kg (235 lb)   02/06/25 106.6 kg (235 lb 0.2 oz)     Temp Readings from Last 3 Encounters:   02/06/25 97.6 °F (36.4 °C) (Temporal)   10/07/24 98.1 °F (36.7 °C) (Oral)   06/05/24 (!) 93 °F (33.9 °C) (Oral)     BP Readings from Last 3 Encounters:   02/17/25 131/65   02/06/25 116/66   02/06/25 (!) 118/59     Pulse Readings from Last 3 Encounters:   02/17/25 98   02/06/25 60   02/06/25 60        There is no height or weight on file to calculate BMI. Estimated body surface area is 2.23 meters squared as calculated from the following:    Height as of 2/6/25: 5' 6" (1.676 m).    Weight as of 2/6/25: 106.6 kg (235 lb).     Physical Exam  Constitutional:       Appearance: She is well-developed.   HENT:      Head: Normocephalic and atraumatic.      Right Ear: External ear normal.      Left Ear: External ear normal.   Eyes:      Conjunctiva/sclera: Conjunctivae normal.      Pupils: Pupils are equal, round, and reactive to light.   Neck:      Vascular: No JVD.   Cardiovascular:      Rate and Rhythm: Normal rate and regular rhythm.      Pulses: Intact distal pulses.      Heart sounds: S1 normal and S2 normal. No murmur heard.     No friction rub. No gallop.   Pulmonary:      Effort: Pulmonary effort is normal.      Breath " sounds: Normal breath sounds.   Abdominal:      General: Bowel sounds are normal. There is no distension.      Palpations: Abdomen is soft.      Tenderness: There is no abdominal tenderness. There is no guarding or rebound.   Musculoskeletal:      Cervical back: Normal range of motion and neck supple.      Right lower leg: 3+ Edema present.      Left lower leg: 3+ Edema present.   Neurological:      Mental Status: She is alert and oriented to person, place, and time.          Lab Results   Component Value Date    BNP 92 10/15/2024     01/30/2025    K 4.3 01/30/2025    MG 2.0 10/15/2024     01/30/2025    CO2 26 01/30/2025    BUN 25 (H) 01/30/2025    CREATININE 1.6 (H) 01/30/2025    GLU 61 (L) 01/30/2025    HGBA1C 6.6 (H) 06/04/2024    AST 15 10/15/2024    ALT 14 10/15/2024    ALBUMIN 3.8 10/15/2024    PROT 7.0 10/15/2024    BILITOT 0.5 10/15/2024    WBC 4.50 02/17/2025    HGB 12.0 02/17/2025    HCT 37.2 02/17/2025    HCT 39 10/07/2024     02/17/2025    INR 1.0 01/30/2025    TSH 5.753 (H) 10/15/2024    CHOL 202 (H) 06/04/2024    HDL 48 06/04/2024    LDLCALC 135.8 06/04/2024    TRIG 91 06/04/2024    FREET4 0.89 10/15/2024         Results for orders placed during the hospital encounter of 10/15/24    Echo    Interpretation Summary    Left Ventricle: The left ventricle is normal in size. Normal wall thickness. There is concentric hypertrophy. Global hypokinesis present. Septal motion is consistent with pacing. There is mildly reduced systolic function with a visually estimated ejection fraction of 45 - 50%.    Right Ventricle: Normal right ventricular cavity size. Wall thickness is normal. Systolic function is normal. Pacemaker lead present in the ventricle.    Left Atrium: Left atrium is mildly dilated.    Aortic Valve: The aortic valve is a trileaflet valve. There is mild aortic valve sclerosis. There is mild aortic regurgitation.    Tricuspid Valve: There is mild regurgitation.    Pulmonary Artery:  The estimated pulmonary artery systolic pressure is 23 mmHg.    IVC/SVC: Normal venous pressure at 3 mmHg.        Results for orders placed during the hospital encounter of 02/06/25    Intra-Procedure Documentation    Narrative  MICKY performed in the Invasive Lab  - See Procedure Log link below for nursing documentation  - See MICKY order on Card Proc Tab for physician findings         Assessment and Plan:  There are no diagnoses linked to this encounter.      Chronic systolic HF, NYHA class III, stage C.  Etiology: ischemic vs RV pacing vs a. fib  Devices: dual chamber ICD  Medications: carvedilol, amlodipine.  Hemodynamic status: warm, normotensive, centrally euvolemic.  Plan: no changes on medications today.  We discussed RHC as an option to clarify the cause of her symptoms. I did not see high JVP on physical exam despite of her lower extremity edema. Her most recent echo found normal RA pressure.  Patient declined RHC at this time.     F/u in 6 months with labs

## 2025-02-19 LAB — NT-PROBNP SERPL IA-MCNC: 601 PG/ML

## 2025-03-11 ENCOUNTER — CLINICAL SUPPORT (OUTPATIENT)
Dept: CARDIOLOGY | Facility: HOSPITAL | Age: 75
End: 2025-03-11
Payer: MEDICARE

## 2025-03-11 ENCOUNTER — CLINICAL SUPPORT (OUTPATIENT)
Dept: CARDIOLOGY | Facility: HOSPITAL | Age: 75
End: 2025-03-11
Attending: STUDENT IN AN ORGANIZED HEALTH CARE EDUCATION/TRAINING PROGRAM
Payer: MEDICARE

## 2025-03-11 DIAGNOSIS — I44.2 ATRIOVENTRICULAR BLOCK, COMPLETE: ICD-10-CM

## 2025-03-11 DIAGNOSIS — R00.1 BRADYCARDIA, UNSPECIFIED: ICD-10-CM

## 2025-03-11 DIAGNOSIS — Z95.0 PRESENCE OF CARDIAC PACEMAKER: ICD-10-CM

## 2025-03-11 PROCEDURE — 93296 REM INTERROG EVL PM/IDS: CPT | Performed by: STUDENT IN AN ORGANIZED HEALTH CARE EDUCATION/TRAINING PROGRAM

## 2025-03-11 PROCEDURE — 93294 REM INTERROG EVL PM/LDLS PM: CPT | Mod: ,,, | Performed by: STUDENT IN AN ORGANIZED HEALTH CARE EDUCATION/TRAINING PROGRAM

## 2025-03-24 NOTE — PROGRESS NOTES
Ms. Holder is a patient of Dr. Marie and was last seen in clinic 8/23/2024.      Subjective:   Patient ID:  Morelia Holder is a 74 y.o. female who presents for follow up of Atrial Fibrillation and Pacemaker Check  .     HPI:    Ms. Holder is a 74 y.o. female with AF, PPM, CHB, GIDD, HTN, HLD, DM, asthma GERD, RLS, obesity here for follow up after cardioversion.    Background:    History of Present Illness:  Ms. Morelia Holder is a araceli 74-year-old woman who returns to clinic today for ongoing evaluation and routine device surveillance of a dual-chamber pacemaker, implanted 6/2/2022 in the setting of complete heart block, with periods of paroxysmal atrial fibrillation. She has a past medical history significant for periods of complete heart block, s/p implantation of a dual-chamber pacemaker on 6/2/2022, paroxysmal atrial fibrillation, mildly reduced systolic dysfunction with most recent LVEF 45-50% with GIIDD, hypertension, hyperlipidemia, type II diabetes mellitus, mild persistent asthma, GERD, restless leg syndrome, and obesity.     In brief review of her EP history, she is followed in general cardiology clinic with Dr. Witt and was seen following a surveillance colonoscopy that revealed a period of complete heart block. In clinic, she reported symptoms of generalized fatigue, worsened shortness of breath, and exercise intolerance. Her systolic function was preserved on echocardiogram, with her baseline ECG revealing complete heart block with incomplete RBBB. She underwent successful device implantation on 6/2/2022. Following device implantation, she was noted to have periods of paroxysmal atrial fibrillation. She was initiated on oral anticoagulation with apixaban 5mg po BID with no adverse bleeding events reported. At our previous appointments, we initiated rate control and improved antihypertensive effect with carvedilol 3.125mg po BID. She was recently admitted to [facility]  "[facility] from 6/3/2024 - 6/5/2024 with complaints of shortness of breath and a cough, diagnosed with an upper respiratory tract infection. A repeat echocardiogram at this admission revealed mildly reduced systolic dysfunction with most recent LVEF 45-50% with GIIDD, potentially in the setting of an increased burden of atrial fibrillation. On device interrogation today, her overall AT/AF burden remains elevated at 31%; likely, this may even be higher secondary to periods of undersensing. She underwent a pharmacologic nuclear cardiac stress test with a SPECT revealing a fixed inferior wall perfusion defect with no reversible ischemia. A PET test has been ordered, but has not yet been completed.     In discussion with Ms. Holder today, she tells me that she is feeling overall "better" since her recent discharge home from [facility]. She reports that her breathing has improved and that she has continued to recover from her URTI. She reports continued symptoms of mild exercise intolerance. She feels that her lower extremity edema and daily weights have remained stable on lasix 40mg po daily. She denies any episodes of dizziness, lightheadedness, syncope/presyncope, palpitations, chest pain or chest discomfort, nausea or vomiting, orthopnea, or PND. She reports that her baseline shortness of breath and dyspnea with exertion has returned to her baseline. She often naps during the day. She can climb more than one flight of stairs prior to needing to take a break, and continues to attempt to increase her level of physical activity. She recently walked around her neighborhood with her grandchildren without limitation, but reports she was tired following her walking. She reports continued back and leg pain.      - She has a normally functioning dual-chamber pacemaker on device interrogation today. She is presently AP-, with underlying sinus bradycardia with complete heart block at 39 bpm. She is RA paced " 65%, and RV paced >99%.   - She has had continued episodes of atrial fibrillation, with the longest event lasting 7 days and 11 hours on 6/21/2024. Her overall AT/AF burden is increased at 31% with excellent rate control. Possibly, this AF burden may be even higher secondary to periods of undersensing. Her atrial detection rate was decreased from 180 bpm --> 170 bpm, and her RA sensitivity was reprogrammed to 0.3mV. There are no concerning AHR or VHR episodes noted. She remains DDDR in an effort to minimize her pacing.   - She was recently admitted with evidence of newly depressed systolic function, with her most recent LVEF 45-50% with GIIDD. Potentially, this decrease in her systolic function may be secondary to increased periods of atrial fibrillation. We will start antiarrhythmic therapy with low-dose amiodarone 200mg po daily.  - We will refer her into Advanced Heart Failure clinic in order to initiate guideline-directed medical therapy. She is presently on carvedilol 3.125mg po BID and spironolactone 25mg po daily. Her losartan and entresto were discontinued at her recent admission secondary to an GILSON and will need to be reintroduced once her renal function has stabilized.     - She requests referral into a new general cardiologist. This referral was placed today. She was planned to undergo further ischemic evaluation with a pharmacologic nuclear cardiac stress test with a PET scan. This testing has been ordered, but has not yet been completed.   - We discussed that her depressed systolic function may persist, despite improved control of her paroxysmal atrial fibrillation. In the event she continues to have reduced systolic function, she has an increased RV pacing burden near 100%. Her paced QRSd is wide at 156ms. She reports symptoms of lower extremity edema, but denies any additional signs or symptoms of volume overload at this time. We discussed that she may require device upgrade to a CRT-P with  implantation of a coronary sinus lead. We have reprogrammed her device today to attempt to decrease her  burden and to better sense her AF events. We discussed continued remote transmissions every three months, with her next in-office transmission in six months.  - She remains on oral anticoagulation with apixaban 5mg po BID. She denies any adverse bleeding events.    Update (03/27/2025):    12/10/2024:  Pt last seen in EP clinic 8/23/24:  Started antiarrhythmic therapy with low-dose amiodarone 200mg on 8/23/24 for her AF  Discussed that she may require device upgrade to a CRT-P with implantation of a coronary sinus lead.   Historically she is RV paced >99%  Anticoagulated with apixaban 5mg po BID  Persistent AF in progress since 9/15/24, despite amiodarone    12/16/2024: Pt stated she has been experiencing increased SOB with exertion recently. Pt confirmed that she is taking Amiodarone as prescribed. Informed pt that Dr. Marie recommends a DCCV/MICKY to restore SR    2/6/2025: Cardioversion was successfully performed with restoration of normal sinus rhythm.   She will continue carvedilol 3.125mg po BID, and will remain on uninterrupted oral anticoagulation with apixaban 5mg po BID. We will reload her amiodarone therapy, to be followed by amiodarone 200mg po daily maintenance.     Today she says she felt better after cardioversion for a couple of weeks then started feeling more MENDEZ. Also reporting tremor/twitch in left arm (not right) that has worsened. She can mentally stop it for a moment but it will recur. Also experiencing more hot flashes. Fatigue with episodes. No CP, palps, syncope reported.    She is currently taking eliquis 5mg BID for stroke prophylaxis and denies significant bleeding episodes. She is currently being treated with amiodarone 200mg daily for rhythm control and carvedilol 3.125mg BID for HR control.  Kidney function is low, with a creatinine of 1.6 on 2/17/2025. TSH and LFTs ok  2/2025.    Device Interrogation (3/27/2025) reveals an intrinsic SB with CHB junctional escape with stable lead and device function. No arrhythmias since DCCV.  She paces 100% in the RA and 100% in the RV. Estimated battery longevity 6-7.3 years.     I have personally reviewed the patient's EKG today, which shows APVP at 60bpm. MN interval is 216. QRS is 158. QTc is 488.    Relevant Cardiac Test Results:    MICKY (2/6/2025):    Left Ventricle: The left ventricle is normal in size. Normal wall motion. There is low normal  to mildly reduced systolic function. Ejection fraction is approximately 50% (Appears to be  upper 40s to low 50s).    Right Ventricle: Normal right ventricular cavity size. Systolic function is mildly reduced.    Left Atrium: Left atrium is moderately dilated. The left atrial appendage has a windsock morphology. Appendage velocity is normal at greater than 40 cm/sec. There is no thrombus in the left atrial appendage.    Right Atrium: Right atrium is mildly dilated.    Mitral Valve: There is mild to moderate regurgitation.    Aorta: Aortic root is the upper limits of normal in size. Ascending aorta is mildly dilated measuring 3.7 cm.    Current Outpatient Medications   Medication Sig    amiodarone (PACERONE) 200 MG Tab 1 tab PO BID x 14 days, then 1 tab PO qD thereafter (Patient taking differently: Take 200 mg by mouth 2 (two) times daily. 1 tab PO BID x 14 days, then 1 tab PO qD thereafter)    amLODIPine (NORVASC) 10 MG tablet Take 10 mg by mouth once daily.    aspirin (ECOTRIN) 81 MG EC tablet Take 1 tablet (81 mg total) by mouth once daily.    blood sugar diagnostic (CONTOUR NEXT TEST STRIPS MISC) Contour Next Test Strips    carvediloL (COREG) 3.125 MG tablet Take 1 tablet (3.125 mg total) by mouth 2 (two) times daily with meals.    colestipol (COLESTID) 1 gram Tab     DULoxetine (CYMBALTA) 20 MG capsule Take 20 mg by mouth once daily.    ELIQUIS 5 mg Tab Take 1 tablet by mouth twice daily     "furosemide (LASIX) 20 MG tablet Take 1 tablet (20 mg total) by mouth daily as needed. (Patient taking differently: Take 20 mg by mouth 2 (two) times daily as needed.)    insulin glargine 100 units/mL (3mL) SubQ pen Inject 20 Units into the skin every evening.    lancets (MICROLET LANCET MISC) Microlet Lancet   TEST TWICE A DAY    metFORMIN (GLUCOPHAGE) 500 MG tablet Take 1,000 mg by mouth 2 (two) times daily with meals.     zolpidem (AMBIEN) 10 mg Tab Take 10 mg by mouth nightly as needed.     No current facility-administered medications for this visit.       Review of Systems   Constitutional: Negative for malaise/fatigue.   Cardiovascular:  Positive for dyspnea on exertion. Negative for chest pain, irregular heartbeat, leg swelling and palpitations.   Respiratory:  Negative for shortness of breath.    Endocrine: Positive for heat intolerance (hot flashes).   Hematologic/Lymphatic: Negative for bleeding problem.   Skin:  Negative for rash.   Musculoskeletal:  Negative for myalgias.   Gastrointestinal:  Negative for hematemesis, hematochezia and nausea.   Genitourinary:  Negative for hematuria.   Neurological:  Positive for tremors. Negative for light-headedness.   Psychiatric/Behavioral:  Negative for altered mental status.    Allergic/Immunologic: Negative for persistent infections.       Objective:          BP (!) 140/65   Pulse 60   Ht 5' 6" (1.676 m)   Wt 105.1 kg (231 lb 11.3 oz)   BMI 37.40 kg/m²     Physical Exam  Vitals and nursing note reviewed.   Constitutional:       Appearance: Normal appearance. She is well-developed.   HENT:      Head: Normocephalic.      Nose: Nose normal.   Eyes:      Pupils: Pupils are equal, round, and reactive to light.   Cardiovascular:      Rate and Rhythm: Normal rate and regular rhythm.   Pulmonary:      Effort: No respiratory distress.   Chest:      Comments: PPM in situ  Musculoskeletal:         General: Normal range of motion.   Skin:     General: Skin is warm and dry. "      Findings: No erythema.   Neurological:      Mental Status: She is alert and oriented to person, place, and time.   Psychiatric:         Speech: Speech normal.         Behavior: Behavior normal.           Lab Results   Component Value Date     02/17/2025    K 4.6 02/17/2025    MG 2.0 10/15/2024    BUN 29 (H) 02/17/2025    CREATININE 1.6 (H) 02/17/2025    ALT 14 02/17/2025    AST 18 02/17/2025    HGB 12.0 02/17/2025    HCT 37.2 02/17/2025    HCT 39 10/07/2024    TSH 4.621 (H) 02/17/2025    LDLCALC 135.8 06/04/2024       Recent Labs   Lab 06/02/22  1410 01/03/25  1142 01/30/25  1143   INR 1.0 1.0 1.0       Assessment:     1. Persistent atrial fibrillation    2. Class 2 severe obesity due to excess calories with serious comorbidity and body mass index (BMI) of 38.0 to 38.9 in adult    3. Chronic combined systolic and diastolic heart failure    4. Complete heart block    5. HFrEF (heart failure with reduced ejection fraction)    6. Primary hypertension    7. Nonischemic cardiomyopathy    8. Cardiac pacemaker in situ    9. Tremor      Plan:     In summary, Ms. Holder is a 74 y.o. female with AF, PPM, CHB, GIDD, HTN, HLD, DM, asthma GERD, RLS, obesity here for follow up after cardioversion.  She is 6 weeks s/p DCCV. She is doing well from a rhythm standpoint, with no documented or symptomatic recurrence of arrhythmia since procedure. She felt better initially after DCCV but is now not feeling as well.  Remains on amiodarone 200mg BID for rhythm control. She is reporting worsening MENDEZ, twitching/tremor in her left arm, and hot flashes. Will reduce amio back to 200mg daily. Also refer to neuro for tremor. On eliquis for CVA prophylaxis.  EE showed EF 50% which is stable. Historically high RV pacing due to CHB. Discussed possibility of CRT upgrade in the future if symptoms do not improve. RTC 3 mo to discuss.  She is doing well from a device perspective with stable lead and device function. Lead parameters  stable.    Decrease amiodarone to 200mg once daily  Neuro referral  Continue other medication regimen and device settings.   Follow up in device clinic as scheduled.   Follow up in EP clinic in 3 mo, sooner as needed.     *A copy of this note has been sent to Dr. Marie*    Follow up in about 3 months (around 6/27/2025) for Virtual Visit.    --------------    FRANSICO Basilio, NP-C  Cardiac Electrophysiology

## 2025-03-27 ENCOUNTER — HOSPITAL ENCOUNTER (OUTPATIENT)
Dept: CARDIOLOGY | Facility: CLINIC | Age: 75
Discharge: HOME | End: 2025-03-27
Payer: MEDICARE

## 2025-03-27 ENCOUNTER — CLINICAL SUPPORT (OUTPATIENT)
Dept: CARDIOLOGY | Facility: HOSPITAL | Age: 75
End: 2025-03-27
Attending: STUDENT IN AN ORGANIZED HEALTH CARE EDUCATION/TRAINING PROGRAM
Payer: MEDICARE

## 2025-03-27 ENCOUNTER — OFFICE VISIT (OUTPATIENT)
Dept: ELECTROPHYSIOLOGY | Facility: CLINIC | Age: 75
End: 2025-03-27
Payer: MEDICARE

## 2025-03-27 VITALS
BODY MASS INDEX: 37.23 KG/M2 | WEIGHT: 231.69 LBS | HEART RATE: 60 BPM | SYSTOLIC BLOOD PRESSURE: 140 MMHG | HEIGHT: 66 IN | DIASTOLIC BLOOD PRESSURE: 65 MMHG

## 2025-03-27 DIAGNOSIS — I50.42 CHRONIC COMBINED SYSTOLIC AND DIASTOLIC HEART FAILURE: ICD-10-CM

## 2025-03-27 DIAGNOSIS — I48.19 PERSISTENT ATRIAL FIBRILLATION: Primary | ICD-10-CM

## 2025-03-27 DIAGNOSIS — I48.0 PAROXYSMAL ATRIAL FIBRILLATION: Primary | ICD-10-CM

## 2025-03-27 DIAGNOSIS — E66.812 CLASS 2 SEVERE OBESITY DUE TO EXCESS CALORIES WITH SERIOUS COMORBIDITY AND BODY MASS INDEX (BMI) OF 38.0 TO 38.9 IN ADULT: ICD-10-CM

## 2025-03-27 DIAGNOSIS — I44.2 COMPLETE HEART BLOCK: ICD-10-CM

## 2025-03-27 DIAGNOSIS — I50.20 HFREF (HEART FAILURE WITH REDUCED EJECTION FRACTION): ICD-10-CM

## 2025-03-27 DIAGNOSIS — I42.8 NONISCHEMIC CARDIOMYOPATHY: ICD-10-CM

## 2025-03-27 DIAGNOSIS — E66.01 CLASS 2 SEVERE OBESITY DUE TO EXCESS CALORIES WITH SERIOUS COMORBIDITY AND BODY MASS INDEX (BMI) OF 38.0 TO 38.9 IN ADULT: ICD-10-CM

## 2025-03-27 DIAGNOSIS — Z95.0 CARDIAC PACEMAKER IN SITU: ICD-10-CM

## 2025-03-27 DIAGNOSIS — I10 PRIMARY HYPERTENSION: ICD-10-CM

## 2025-03-27 DIAGNOSIS — R25.1 TREMOR: ICD-10-CM

## 2025-03-27 LAB
OHS QRS DURATION: 158 MS
OHS QTC CALCULATION: 488 MS

## 2025-03-27 PROCEDURE — 93010 ELECTROCARDIOGRAM REPORT: CPT | Mod: S$GLB,,, | Performed by: INTERNAL MEDICINE

## 2025-03-27 PROCEDURE — 93005 ELECTROCARDIOGRAM TRACING: CPT | Mod: S$GLB,,, | Performed by: STUDENT IN AN ORGANIZED HEALTH CARE EDUCATION/TRAINING PROGRAM

## 2025-03-27 PROCEDURE — 99999 PR PBB SHADOW E&M-EST. PATIENT-LVL IV: CPT | Mod: PBBFAC,,, | Performed by: NURSE PRACTITIONER

## 2025-03-27 PROCEDURE — 93280 PM DEVICE PROGR EVAL DUAL: CPT

## 2025-03-27 RX ORDER — AMIODARONE HYDROCHLORIDE 200 MG/1
200 TABLET ORAL DAILY
Qty: 90 TABLET | Refills: 1 | Status: SHIPPED | OUTPATIENT
Start: 2025-03-27

## 2025-03-29 LAB
OHS CV AF BURDEN PERCENT: 46
OHS CV AF BURDEN PERCENT: < 1
OHS CV DC REMOTE DEVICE TYPE: NORMAL
OHS CV DC REMOTE DEVICE TYPE: NORMAL
OHS CV ICD SHOCK: NO
OHS CV RV PACING PERCENT: 99
OHS CV RV PACING PERCENT: 99 %

## 2025-06-03 PROBLEM — Z79.899 ON AMIODARONE THERAPY: Status: ACTIVE | Noted: 2025-06-03

## 2025-06-03 PROBLEM — Z79.01 ON ANTICOAGULANT THERAPY: Status: ACTIVE | Noted: 2025-06-03

## 2025-06-03 NOTE — PROGRESS NOTES
Ms. Holder is a patient of Dr. Marie and was last seen in clinic 3/27/2025.      Subjective:   Patient ID:  Morelia Holder is a 75 y.o. female who presents for follow up of Atrial Fibrillation and Pacemaker Check  .     HPI:    Ms. Holder is a 75 y.o. female with AF, PPM, CHB, GIDD, HTN, HLD, DM, asthma GERD, RLS, obesity here for follow up.    Background:    History of Present Illness:  Ms. Morelia Holder is a araceli 74-year-old woman who returns to clinic today for ongoing evaluation and routine device surveillance of a dual-chamber pacemaker, implanted 6/2/2022 in the setting of complete heart block, with periods of paroxysmal atrial fibrillation. She has a past medical history significant for periods of complete heart block, s/p implantation of a dual-chamber pacemaker on 6/2/2022, paroxysmal atrial fibrillation, mildly reduced systolic dysfunction with most recent LVEF 45-50% with GIIDD, hypertension, hyperlipidemia, type II diabetes mellitus, mild persistent asthma, GERD, restless leg syndrome, and obesity.     In brief review of her EP history, she is followed in general cardiology clinic with Dr. Witt and was seen following a surveillance colonoscopy that revealed a period of complete heart block. In clinic, she reported symptoms of generalized fatigue, worsened shortness of breath, and exercise intolerance. Her systolic function was preserved on echocardiogram, with her baseline ECG revealing complete heart block with incomplete RBBB. She underwent successful device implantation on 6/2/2022. Following device implantation, she was noted to have periods of paroxysmal atrial fibrillation. She was initiated on oral anticoagulation with apixaban 5mg po BID with no adverse bleeding events reported. At our previous appointments, we initiated rate control and improved antihypertensive effect with carvedilol 3.125mg po BID. She was recently admitted to [facility] from 6/3/2024 -  "6/5/2024 with complaints of shortness of breath and a cough, diagnosed with an upper respiratory tract infection. A repeat echocardiogram at this admission revealed mildly reduced systolic dysfunction with most recent LVEF 45-50% with GIIDD, potentially in the setting of an increased burden of atrial fibrillation. On device interrogation today, her overall AT/AF burden remains elevated at 31%; likely, this may even be higher secondary to periods of undersensing. She underwent a pharmacologic nuclear cardiac stress test with a SPECT revealing a fixed inferior wall perfusion defect with no reversible ischemia. A PET test has been ordered, but has not yet been completed.     In discussion with Ms. Holder today, she tells me that she is feeling overall "better" since her recent discharge home from [facility]. She reports that her breathing has improved and that she has continued to recover from her URTI. She reports continued symptoms of mild exercise intolerance. She feels that her lower extremity edema and daily weights have remained stable on lasix 40mg po daily. She denies any episodes of dizziness, lightheadedness, syncope/presyncope, palpitations, chest pain or chest discomfort, nausea or vomiting, orthopnea, or PND. She reports that her baseline shortness of breath and dyspnea with exertion has returned to her baseline. She often naps during the day. She can climb more than one flight of stairs prior to needing to take a break, and continues to attempt to increase her level of physical activity. She recently walked around her neighborhood with her grandchildren without limitation, but reports she was tired following her walking. She reports continued back and leg pain.      - She has a normally functioning dual-chamber pacemaker on device interrogation today. She is presently AP-, with underlying sinus bradycardia with complete heart block at 39 bpm. She is RA paced 65%, and RV paced >99%. "   - She has had continued episodes of atrial fibrillation, with the longest event lasting 7 days and 11 hours on 6/21/2024. Her overall AT/AF burden is increased at 31% with excellent rate control. Possibly, this AF burden may be even higher secondary to periods of undersensing. Her atrial detection rate was decreased from 180 bpm --> 170 bpm, and her RA sensitivity was reprogrammed to 0.3mV. There are no concerning AHR or VHR episodes noted. She remains DDDR in an effort to minimize her pacing.   - She was recently admitted with evidence of newly depressed systolic function, with her most recent LVEF 45-50% with GIIDD. Potentially, this decrease in her systolic function may be secondary to increased periods of atrial fibrillation. We will start antiarrhythmic therapy with low-dose amiodarone 200mg po daily.  - We will refer her into Advanced Heart Failure clinic in order to initiate guideline-directed medical therapy. She is presently on carvedilol 3.125mg po BID and spironolactone 25mg po daily. Her losartan and entresto were discontinued at her recent admission secondary to an GILSON and will need to be reintroduced once her renal function has stabilized.     - She requests referral into a new general cardiologist. This referral was placed today. She was planned to undergo further ischemic evaluation with a pharmacologic nuclear cardiac stress test with a PET scan. This testing has been ordered, but has not yet been completed.   - We discussed that her depressed systolic function may persist, despite improved control of her paroxysmal atrial fibrillation. In the event she continues to have reduced systolic function, she has an increased RV pacing burden near 100%. Her paced QRSd is wide at 156ms. She reports symptoms of lower extremity edema, but denies any additional signs or symptoms of volume overload at this time. We discussed that she may require device upgrade to a CRT-P with implantation of a coronary sinus  lead. We have reprogrammed her device today to attempt to decrease her  burden and to better sense her AF events. We discussed continued remote transmissions every three months, with her next in-office transmission in six months.  - She remains on oral anticoagulation with apixaban 5mg po BID. She denies any adverse bleeding events.    12/10/2024:  Pt last seen in EP clinic 8/23/24:  Started antiarrhythmic therapy with low-dose amiodarone 200mg on 8/23/24 for her AF  Discussed that she may require device upgrade to a CRT-P with implantation of a coronary sinus lead.   Historically she is RV paced >99%  Anticoagulated with apixaban 5mg po BID  Persistent AF in progress since 9/15/24, despite amiodarone    12/16/2024: Pt stated she has been experiencing increased SOB with exertion recently. Pt confirmed that she is taking Amiodarone as prescribed. Informed pt that Dr. Marie recommends a DCCV/MICKY to restore SR    2/6/2025: Cardioversion was successfully performed with restoration of normal sinus rhythm.   She will continue carvedilol 3.125mg po BID, and will remain on uninterrupted oral anticoagulation with apixaban 5mg po BID. We will reload her amiodarone therapy, to be followed by amiodarone 200mg po daily maintenance.     3/27/2025: She is 6 weeks s/p DCCV. She is doing well from a rhythm standpoint, with no documented or symptomatic recurrence of arrhythmia since procedure. She felt better initially after DCCV but is now not feeling as well.  Remains on amiodarone 200mg BID for rhythm control. She is reporting worsening MENDEZ, twitching/tremor in her left arm, and hot flashes. Will reduce amio back to 200mg daily. Also refer to neuro for tremor. On eliquis for CVA prophylaxis.  MICKY showed EF 50% which is stable. Historically high RV pacing due to CHB. Discussed possibility of CRT upgrade in the future if symptoms do not improve. RTC 3 mo to discuss.  She is doing well from a device perspective with stable lead  and device function. Lead parameters stable. Decrease amiodarone to 200mg once daily Neuro referral      Update (06/11/2025):    Today she says she feels ok other than persistent fatigue. Continues to have night sweats at times as well as left arm tremor. No worsening MENDEZ, palps, LH, syncope reported.    She is currently taking eliquis 5mg BID for stroke prophylaxis and denies significant bleeding episodes. She is currently being treated with amiodarone 200mg daily for rhythm control and carvedilol 3.125mg BID for HR control.  Kidney function is low, with a creatinine of 1.6 on 2/17/2025. LFTs ok 2/2025. TSH WNL 4/2025.    Device Interrogation (6/11/2025) reveals an intrinsic SB with CHB with stable lead and device function. No arrhythmias or treated episodes were noted.  She paces 99% in the RA and 99% in the RV. Estimated battery longevity 6.6-7.2 years.     I have personally reviewed the patient's EKG today, which shows APVP at 61bpm. OH interval is 212. QRS is 162. QTc is 489.    Relevant Cardiac Test Results:    MICKY (2/6/2025):    Left Ventricle: The left ventricle is normal in size. Normal wall motion. There is low normal  to mildly reduced systolic function. Ejection fraction is approximately 50% (Appears to be  upper 40s to low 50s).    Right Ventricle: Normal right ventricular cavity size. Systolic function is mildly reduced.    Left Atrium: Left atrium is moderately dilated. The left atrial appendage has a windsock morphology. Appendage velocity is normal at greater than 40 cm/sec. There is no thrombus in the left atrial appendage.    Right Atrium: Right atrium is mildly dilated.    Mitral Valve: There is mild to moderate regurgitation.    Aorta: Aortic root is the upper limits of normal in size. Ascending aorta is mildly dilated measuring 3.7 cm.    Current Outpatient Medications   Medication Sig    amiodarone (PACERONE) 200 MG Tab Take 1 tablet (200 mg total) by mouth once daily.    amLODIPine (NORVASC)  "10 MG tablet Take 10 mg by mouth once daily.    aspirin (ECOTRIN) 81 MG EC tablet Take 1 tablet (81 mg total) by mouth once daily.    blood sugar diagnostic (CONTOUR NEXT TEST STRIPS MISC) Contour Next Test Strips    carvediloL (COREG) 3.125 MG tablet Take 1 tablet (3.125 mg total) by mouth 2 (two) times daily with meals.    colestipol (COLESTID) 1 gram Tab     DULoxetine (CYMBALTA) 20 MG capsule Take 20 mg by mouth once daily.    ELIQUIS 5 mg Tab Take 1 tablet by mouth twice daily    furosemide (LASIX) 20 MG tablet Take 1 tablet (20 mg total) by mouth daily as needed. (Patient taking differently: Take 20 mg by mouth 2 (two) times daily as needed.)    insulin glargine 100 units/mL (3mL) SubQ pen Inject 20 Units into the skin every evening.    lancets (MICROLET LANCET MISC) Microlet Lancet   TEST TWICE A DAY    metFORMIN (GLUCOPHAGE) 500 MG tablet Take 1,000 mg by mouth 2 (two) times daily with meals.     zolpidem (AMBIEN) 10 mg Tab Take 10 mg by mouth nightly as needed.     No current facility-administered medications for this visit.       Review of Systems   Constitutional: Positive for malaise/fatigue.   Cardiovascular:  Negative for chest pain, dyspnea on exertion, irregular heartbeat, leg swelling and palpitations.   Respiratory:  Negative for shortness of breath.    Hematologic/Lymphatic: Negative for bleeding problem.   Skin:  Negative for rash.   Musculoskeletal:  Negative for myalgias.   Gastrointestinal:  Negative for hematemesis, hematochezia and nausea.   Genitourinary:  Negative for hematuria.   Neurological:  Positive for tremors. Negative for light-headedness.   Psychiatric/Behavioral:  Negative for altered mental status.    Allergic/Immunologic: Negative for persistent infections.       Objective:          /60 (Patient Position: Sitting)   Pulse 61   Ht 5' 6" (1.676 m)   Wt 107.1 kg (236 lb 1.8 oz)   BMI 38.11 kg/m²     Physical Exam  Vitals and nursing note reviewed.   Constitutional:       " Appearance: Normal appearance. She is well-developed.   HENT:      Head: Normocephalic.      Nose: Nose normal.   Eyes:      Pupils: Pupils are equal, round, and reactive to light.   Cardiovascular:      Rate and Rhythm: Normal rate and regular rhythm.   Pulmonary:      Effort: No respiratory distress.   Chest:      Comments: PPM in situ  Musculoskeletal:         General: Normal range of motion.   Skin:     General: Skin is warm and dry.      Findings: No erythema.   Neurological:      Mental Status: She is alert and oriented to person, place, and time.   Psychiatric:         Speech: Speech normal.         Behavior: Behavior normal.           Lab Results   Component Value Date     02/17/2025    K 4.6 02/17/2025    MG 2.0 10/15/2024    BUN 29 (H) 02/17/2025    CREATININE 1.6 (H) 02/17/2025    ALT 14 02/17/2025    AST 18 02/17/2025    HGB 12.0 02/17/2025    HCT 37.2 02/17/2025    HCT 39 10/07/2024    TSH 3.614 04/02/2025    TSH 4.621 (H) 02/17/2025    LDLCALC 135.8 06/04/2024       Recent Labs   Lab 01/03/25  1142 01/30/25  1143   INR 1.0 1.0       Assessment:     1. Cardiac pacemaker in situ    2. Chronic combined systolic and diastolic heart failure    3. Complete heart block    4. Primary hypertension    5. Nonischemic cardiomyopathy    6. Paroxysmal atrial fibrillation    7. On anticoagulant therapy    8. On amiodarone therapy        Plan:     In summary, Ms. Holder is a 75 y.o. female with AF, PPM, CHB, GIDD, HTN, HLD, DM, asthma GERD, RLS, obesity here for follow up.  She is now 4 mo s/p DCCV+amiodarone. She continues to do well from a rhythm standpoint, with no documented or symptomatic recurrence of arrhythmia since procedure. Amiodarone reduced to 200mg daily in march. She is on eliquis for CVA prophylaxis. Device and lead function WNL. CHB with 100% RV pacing. Echo 2/2025 shows EF 50% which is stable. She continues to report fatigue although this could be multifactorial as she also is dealing with a  lot of stress at home. She has CHB and did report feeling better after DCCV for about a month and then felt worse again despite remaining in SR. Unknown to what extent amio may be contributing to her symptoms. As she had breakthrough on amio in the past, she is limited with AAD options. Might consider stopping amio and evaluating for symptom improvement afterward. Will have pt RTC in 3 mo with echo prior to evaluate EF. If decreased, consider CRT-P upgrade.     Continue current medication regimen and device settings.   Follow up in device clinic as scheduled.   Follow up in EP clinic in 3 mo with echo prior, sooner as needed.     *A copy of this note has been sent to Dr. Marie*    Follow up in about 3 months (around 9/11/2025).    ------------------------------------------------------------------    FRANSICO Basilio, NP-C  Cardiac Electrophysiology

## 2025-06-10 ENCOUNTER — CLINICAL SUPPORT (OUTPATIENT)
Dept: CARDIOLOGY | Facility: HOSPITAL | Age: 75
End: 2025-06-10
Attending: STUDENT IN AN ORGANIZED HEALTH CARE EDUCATION/TRAINING PROGRAM
Payer: MEDICARE

## 2025-06-10 ENCOUNTER — CLINICAL SUPPORT (OUTPATIENT)
Dept: CARDIOLOGY | Facility: HOSPITAL | Age: 75
End: 2025-06-10
Payer: MEDICARE

## 2025-06-10 DIAGNOSIS — I44.2 ATRIOVENTRICULAR BLOCK, COMPLETE: ICD-10-CM

## 2025-06-10 DIAGNOSIS — R00.1 BRADYCARDIA, UNSPECIFIED: ICD-10-CM

## 2025-06-10 DIAGNOSIS — Z95.0 PRESENCE OF CARDIAC PACEMAKER: ICD-10-CM

## 2025-06-10 PROCEDURE — 93296 REM INTERROG EVL PM/IDS: CPT | Performed by: STUDENT IN AN ORGANIZED HEALTH CARE EDUCATION/TRAINING PROGRAM

## 2025-06-10 PROCEDURE — 93294 REM INTERROG EVL PM/LDLS PM: CPT | Mod: ,,, | Performed by: STUDENT IN AN ORGANIZED HEALTH CARE EDUCATION/TRAINING PROGRAM

## 2025-06-11 ENCOUNTER — CLINICAL SUPPORT (OUTPATIENT)
Dept: CARDIOLOGY | Facility: HOSPITAL | Age: 75
End: 2025-06-11
Attending: STUDENT IN AN ORGANIZED HEALTH CARE EDUCATION/TRAINING PROGRAM
Payer: MEDICARE

## 2025-06-11 ENCOUNTER — OFFICE VISIT (OUTPATIENT)
Dept: ELECTROPHYSIOLOGY | Facility: CLINIC | Age: 75
End: 2025-06-11
Payer: MEDICARE

## 2025-06-11 ENCOUNTER — HOSPITAL ENCOUNTER (OUTPATIENT)
Dept: CARDIOLOGY | Facility: CLINIC | Age: 75
Discharge: HOME | End: 2025-06-11
Payer: MEDICARE

## 2025-06-11 VITALS
HEIGHT: 66 IN | SYSTOLIC BLOOD PRESSURE: 131 MMHG | DIASTOLIC BLOOD PRESSURE: 60 MMHG | BODY MASS INDEX: 37.95 KG/M2 | WEIGHT: 236.13 LBS | HEART RATE: 61 BPM

## 2025-06-11 DIAGNOSIS — I44.2 COMPLETE HEART BLOCK: ICD-10-CM

## 2025-06-11 DIAGNOSIS — Z79.01 ON ANTICOAGULANT THERAPY: ICD-10-CM

## 2025-06-11 DIAGNOSIS — I10 PRIMARY HYPERTENSION: ICD-10-CM

## 2025-06-11 DIAGNOSIS — I48.0 PAROXYSMAL ATRIAL FIBRILLATION: ICD-10-CM

## 2025-06-11 DIAGNOSIS — Z79.899 ON AMIODARONE THERAPY: ICD-10-CM

## 2025-06-11 DIAGNOSIS — Z95.0 CARDIAC PACEMAKER IN SITU: Primary | ICD-10-CM

## 2025-06-11 DIAGNOSIS — I42.8 NONISCHEMIC CARDIOMYOPATHY: ICD-10-CM

## 2025-06-11 DIAGNOSIS — I50.42 CHRONIC COMBINED SYSTOLIC AND DIASTOLIC HEART FAILURE: ICD-10-CM

## 2025-06-11 LAB
OHS QRS DURATION: 162 MS
OHS QTC CALCULATION: 489 MS

## 2025-06-11 PROCEDURE — 93280 PM DEVICE PROGR EVAL DUAL: CPT | Mod: 26,,, | Performed by: STUDENT IN AN ORGANIZED HEALTH CARE EDUCATION/TRAINING PROGRAM

## 2025-06-11 PROCEDURE — 93010 ELECTROCARDIOGRAM REPORT: CPT | Mod: S$GLB,,, | Performed by: INTERNAL MEDICINE

## 2025-06-11 PROCEDURE — 3288F FALL RISK ASSESSMENT DOCD: CPT | Mod: CPTII,S$GLB,, | Performed by: NURSE PRACTITIONER

## 2025-06-11 PROCEDURE — 99214 OFFICE O/P EST MOD 30 MIN: CPT | Mod: S$GLB,,, | Performed by: NURSE PRACTITIONER

## 2025-06-11 PROCEDURE — 1101F PT FALLS ASSESS-DOCD LE1/YR: CPT | Mod: CPTII,S$GLB,, | Performed by: NURSE PRACTITIONER

## 2025-06-11 PROCEDURE — 3078F DIAST BP <80 MM HG: CPT | Mod: CPTII,S$GLB,, | Performed by: NURSE PRACTITIONER

## 2025-06-11 PROCEDURE — 1126F AMNT PAIN NOTED NONE PRSNT: CPT | Mod: CPTII,S$GLB,, | Performed by: NURSE PRACTITIONER

## 2025-06-11 PROCEDURE — 93280 PM DEVICE PROGR EVAL DUAL: CPT

## 2025-06-11 PROCEDURE — 99999 PR PBB SHADOW E&M-EST. PATIENT-LVL III: CPT | Mod: PBBFAC,,, | Performed by: NURSE PRACTITIONER

## 2025-06-11 PROCEDURE — 1160F RVW MEDS BY RX/DR IN RCRD: CPT | Mod: CPTII,S$GLB,, | Performed by: NURSE PRACTITIONER

## 2025-06-11 PROCEDURE — 1159F MED LIST DOCD IN RCRD: CPT | Mod: CPTII,S$GLB,, | Performed by: NURSE PRACTITIONER

## 2025-06-11 PROCEDURE — 3075F SYST BP GE 130 - 139MM HG: CPT | Mod: CPTII,S$GLB,, | Performed by: NURSE PRACTITIONER

## 2025-06-11 NOTE — Clinical Note
Physical Therapy Progress Note    Visit Type: Progress Note  Visit: 10  Referring Provider: Tonya Dwyer MD  Medical Diagnosis (from order): M17.0 - Primary osteoarthritis of both knees  M19.011, M19.012 - Primary osteoarthritis of both shoulders  R53.81 - Physical deconditioning     SUBJECTIVE                                                                                                               Patient states that since the start of physical therapy she has noticed improvement with her balance and \"stability.\" Explains that she has noticed improvement with her walking. Additionally, she states that she continues to feel more confident with walking modified independently using her rollator. States that she continues to experience difficulties with her balance during activities of daily living and prolonged walking.    Patient is not experiencing any pain symptoms.  Current Functional Limitations: See subjective statement      OBJECTIVE                                                                                                                     Strength  (out of 5 unless noted, standard test position unless noted)   Hip:    - Flexion:         Left: 4         Right: 4    - Extension:         Left: 3-         Right: 3-    - Abduction:         Left: 3-         Right: 3-  Knee:    - Flexion:         Left: 4+         Right: 4+    - Extension:         Left: 4         Right: 4  Ankle:    - Dorsiflexion:         Left: 5         Right: 5             Balance Tests   5 Times Sit to Stand (seconds) 18.53    Norms: 80-89 year old - 14.8 sec    Interpretation:        > 12 seconds indicates need for further assessment for fall risk for community dwelling elderly      > 16 seconds indicative of falls risk for Parkinson's disease  Timed Up and Go (TUG)      - Total time to complete: 20.55 sec, stable on turns     : rollator.    Interpretation: < 10 seconds = normal; > or = 12 seconds is a positive screen for fall  The lead was connected to generator.       risk based on     CDC STEADI tool kit; > or = 13.5 seconds has been shown to indicate high risk for falls     high risk of falls            Outcome/Assessments  Outcome Measures:   Lower Extremity Functional Scale: LEFS Calculated Total: 31 (0=extreme difficulty; 80=no difficulty) see flowsheet for additional documentation    Treatment     Therapeutic Exercise  - Re-evaluation of objective for progress note    Therapeutic Activity  - NuStep (aerobic conditioning) // Level: 1; x10 minutes   - Farmer's carry // x150 feet, 3 pound dumbbells // provided contact guard assist for patient safety  - ambulation w/o assistive device // provided contact guard assist for patient safety    Writer verbally educated and received verbal consent for hand placement, positioning of patient, and techniques to be performed today from patient   Home Exercise Program  Access Code: [email or web address]  URL: [email or web address]  Date: 02/26/2025  Prepared by: Hilda Kim    Exercises  - Sit to Stand with Armchair  - 2-3 x daily - 7 x weekly - 1 sets - 10 reps  - Supine Bridge  - 1-2 x daily - 7 x weekly - 3 sets - 8 reps  - Clamshell  - 1-2 x daily - 7 x weekly - 3 sets - 10 reps  - Standing Tandem Balance with Counter Support  - 1-2 x daily - 7 x weekly - 3 sets - 30 seconds hold      ASSESSMENT                                                                                                            Patient is an 89 y/o Female with a referring physician's diagnosis of physical deconditioning, primary osteoarthritis of both knees, and primary osteoarthritis of both shoulders. Gains in skilled therapy to date as expected in regards to her overall safety with performing functional tasks. These objective improvements have increased their ability to perform the following functional tasks: balancing during activities of daily living and prolonged walking.    Patient continues to present with the following objective impairments:  decreased standing static and dynamic balance, along with decreased strength of gross bilateral LE muscles. These remaining objective impairments continue to require medically necessary skilled therapy interventions to allow the patient to maximize their functional abilities with the following tasks: balance during activities of daily living and prolonged walking.    Patient attended therapy as recommended.  Progressed toward discharge/long term goals (see below for specific status updates): steady progress  Education:   - Results of above outlined education: Verbalizes understanding and Demonstrates understanding    PLAN                                                                                                                          Continue interventions, frequency and duration established at initial evaluation. and Extend frequency and duration per below.  Frequency / Duration  1 times per week tapering as patient progresses for 10 weeks for an estimated total of 10 visits    Suggestions for next session as indicated: - Continue improving static/dynamic balance  - Continue improving strength of gross bilateral LE muscles    Goals  Short Term Goals  1. In 4 weeks, patient will be independent with initial HEP in order to progress to independent management of their condition. STATUS: met- 06/11/25  Long Term Goals: to be met by end of plan of care  1. By discharge, patient will be independent with comprehensive HEP to manage pain, improve strength, balance, gait stability, endurance, and quality, and functional ROM. Status: partially met  Patient continuing to progress toward meeting this goal.  2. In 8-10 weeks, patient will improve in 5x sit to stand score performing < 12 seconds per patient demographic normative value to indicate improved gross bilateral LE decreased risk for fall transferring in home and in the community. Status: partially met Patient continuing to progress toward meeting this goal.  3.  In 8-10 weeks, patient will perform TUG test in less than 12 seconds in order to demonstrate improved safety and balance with walking functional activity in home and community. Status: partially met  Patient continuing to progress toward meeting this goal.        Therapy procedure time and total treatment time can be found documented on the Time Entry flowsheet

## 2025-06-12 LAB
OHS CV AF BURDEN PERCENT: < 1
OHS CV DC REMOTE DEVICE TYPE: NORMAL
OHS CV RV PACING PERCENT: 99

## 2025-06-18 LAB
OHS CV AF BURDEN PERCENT: < 1
OHS CV DC REMOTE DEVICE TYPE: NORMAL
OHS CV RV PACING PERCENT: 99 %

## 2025-06-26 ENCOUNTER — TELEPHONE (OUTPATIENT)
Facility: CLINIC | Age: 75
End: 2025-06-26
Payer: MEDICARE

## 2025-06-26 NOTE — TELEPHONE ENCOUNTER
----- Message from KEDAR Long sent at 6/26/2025  3:08 PM CDT -----  Regarding: FW: referral    ----- Message -----  From: Siobhan Amaro MA  Sent: 3/27/2025   2:32 PM CDT  To: [facility] - Headaches Clinical Support #  Subject: referral                                         Pt being referred to neurology by ENEIDA Jaimes, in active requests, please help assist with scheduling.    Thanks!  Siobhan Amaro MA

## 2025-07-17 ENCOUNTER — TELEPHONE (OUTPATIENT)
Facility: CLINIC | Age: 75
End: 2025-07-17
Payer: MEDICARE

## 2025-08-07 ENCOUNTER — TELEPHONE (OUTPATIENT)
Dept: TRANSPLANT | Facility: CLINIC | Age: 75
End: 2025-08-07
Payer: MEDICARE

## 2025-08-07 DIAGNOSIS — I50.9 CONGESTIVE HEART FAILURE, UNSPECIFIED HF CHRONICITY, UNSPECIFIED HEART FAILURE TYPE: Primary | ICD-10-CM

## 2025-08-12 ENCOUNTER — PATIENT MESSAGE (OUTPATIENT)
Dept: CARDIOLOGY | Facility: CLINIC | Age: 75
End: 2025-08-12
Payer: MEDICARE

## 2025-08-12 DIAGNOSIS — R94.39 ABNORMAL NUCLEAR STRESS TEST: ICD-10-CM

## 2025-08-14 RX ORDER — ASPIRIN 81 MG/1
81 TABLET ORAL
Qty: 90 TABLET | Refills: 0 | Status: SHIPPED | OUTPATIENT
Start: 2025-08-14

## (undated) DEVICE — SLING SWATHE UNIVERSAL FOAM

## (undated) DEVICE — DRESSING AQUACEL AG ADV 3.5X6

## (undated) DEVICE — SHEATH SAFESHEATH II ULTRA 6FR

## (undated) DEVICE — KIT MICROINTRO 4F .018X40X7CM

## (undated) DEVICE — GAUZE SPONGE 4X4 12PLY

## (undated) DEVICE — BANDAGE ELASTOPLAST 3INX5YDS

## (undated) DEVICE — STYLET 52CM

## (undated) DEVICE — ELECTRODE REM PLYHSV RETURN 9

## (undated) DEVICE — PACK PACER PERMANENT

## (undated) DEVICE — ADHESIVE DERMABOND ADVANCED

## (undated) DEVICE — PAD DEFIB CADENCE ADULT R2

## (undated) DEVICE — DRAPE INCISE IOBAN 2 23X17IN

## (undated) DEVICE — INTRODUCER OPTISEAL 6F 13CM